# Patient Record
Sex: MALE | Race: WHITE | NOT HISPANIC OR LATINO | Employment: OTHER | URBAN - METROPOLITAN AREA
[De-identification: names, ages, dates, MRNs, and addresses within clinical notes are randomized per-mention and may not be internally consistent; named-entity substitution may affect disease eponyms.]

---

## 2013-11-26 LAB
CREAT SERPL-MCNC: 0.87 MG/DL (ref 0.72–1.25)
GLUCOSE BLD-MCNC: 119 MG/DL (ref 65–100)

## 2014-09-11 LAB
CREAT SERPL-MCNC: 1.26 MG/DL (ref 0.72–1.25)
GLUCOSE BLD-MCNC: 175 MG/DL (ref 65–100)

## 2015-12-29 LAB
CREAT SERPL-MCNC: 1.12 MG/DL (ref 0.72–1.25)
GLUCOSE BLD-MCNC: 274 MG/DL (ref 65–100)

## 2017-03-02 ENCOUNTER — OFFICE VISIT - RIVER FALLS (OUTPATIENT)
Dept: FAMILY MEDICINE | Facility: CLINIC | Age: 67
End: 2017-03-02
Payer: MEDICARE

## 2017-03-09 ENCOUNTER — OFFICE VISIT - RIVER FALLS (OUTPATIENT)
Dept: FAMILY MEDICINE | Facility: CLINIC | Age: 67
End: 2017-03-09
Payer: MEDICARE

## 2017-03-09 ASSESSMENT — MIFFLIN-ST. JEOR: SCORE: 1850.78

## 2017-09-15 ENCOUNTER — COMMUNICATION - RIVER FALLS (OUTPATIENT)
Dept: FAMILY MEDICINE | Facility: CLINIC | Age: 67
End: 2017-09-15
Payer: MEDICARE

## 2017-09-15 ENCOUNTER — AMBULATORY - RIVER FALLS (OUTPATIENT)
Dept: FAMILY MEDICINE | Facility: CLINIC | Age: 67
End: 2017-09-15
Payer: MEDICARE

## 2017-09-16 LAB
CHOLEST SERPL-MCNC: 143 MG/DL
CHOLEST/HDLC SERPL: 3.1 {RATIO}
CREAT SERPL-MCNC: 0.96 MG/DL (ref 0.7–1.25)
GLUCOSE BLD-MCNC: 142 MG/DL (ref 65–99)
HBA1C MFR BLD: 6.6 %
HDLC SERPL-MCNC: 46 MG/DL
LDLC SERPL CALC-MCNC: 76 MG/DL
NONHDLC SERPL-MCNC: 97 MG/DL
PSA SERPL-MCNC: 1.3 NG/ML
TRIGL SERPL-MCNC: 133 MG/DL

## 2017-09-21 ENCOUNTER — OFFICE VISIT - RIVER FALLS (OUTPATIENT)
Dept: FAMILY MEDICINE | Facility: CLINIC | Age: 67
End: 2017-09-21
Payer: MEDICARE

## 2017-09-21 ASSESSMENT — MIFFLIN-ST. JEOR: SCORE: 1850.78

## 2017-11-15 ENCOUNTER — OFFICE VISIT - RIVER FALLS (OUTPATIENT)
Dept: FAMILY MEDICINE | Facility: CLINIC | Age: 67
End: 2017-11-15
Payer: MEDICARE

## 2017-11-15 ASSESSMENT — MIFFLIN-ST. JEOR: SCORE: 1880.72

## 2018-04-04 ENCOUNTER — AMBULATORY - RIVER FALLS (OUTPATIENT)
Dept: FAMILY MEDICINE | Facility: CLINIC | Age: 68
End: 2018-04-04
Payer: MEDICARE

## 2018-04-05 LAB — HBA1C MFR BLD: 8.5 %

## 2018-04-10 ENCOUNTER — OFFICE VISIT - RIVER FALLS (OUTPATIENT)
Dept: FAMILY MEDICINE | Facility: CLINIC | Age: 68
End: 2018-04-10
Payer: MEDICARE

## 2018-04-10 ASSESSMENT — MIFFLIN-ST. JEOR: SCORE: 1863.48

## 2018-05-22 ENCOUNTER — OFFICE VISIT - RIVER FALLS (OUTPATIENT)
Dept: FAMILY MEDICINE | Facility: CLINIC | Age: 68
End: 2018-05-22
Payer: MEDICARE

## 2018-05-22 ASSESSMENT — MIFFLIN-ST. JEOR: SCORE: 1854.41

## 2018-07-19 ENCOUNTER — AMBULATORY - RIVER FALLS (OUTPATIENT)
Dept: FAMILY MEDICINE | Facility: CLINIC | Age: 68
End: 2018-07-19
Payer: MEDICARE

## 2018-07-20 LAB
GLUCOSE BLD-MCNC: 169 MG/DL (ref 65–139)
HBA1C MFR BLD: 8.1 %

## 2018-08-14 ENCOUNTER — OFFICE VISIT - RIVER FALLS (OUTPATIENT)
Dept: FAMILY MEDICINE | Facility: CLINIC | Age: 68
End: 2018-08-14
Payer: MEDICARE

## 2018-08-14 ASSESSMENT — MIFFLIN-ST. JEOR: SCORE: 1828.1

## 2018-10-30 ENCOUNTER — AMBULATORY - RIVER FALLS (OUTPATIENT)
Dept: FAMILY MEDICINE | Facility: CLINIC | Age: 68
End: 2018-10-30
Payer: MEDICARE

## 2018-10-31 LAB
CHOLEST SERPL-MCNC: 156 MG/DL
CHOLEST/HDLC SERPL: 3.4 {RATIO}
CREAT SERPL-MCNC: 1.03 MG/DL (ref 0.7–1.25)
GLUCOSE BLD-MCNC: 233 MG/DL (ref 65–99)
HBA1C MFR BLD: 8.4 %
HDLC SERPL-MCNC: 46 MG/DL
LDLC SERPL CALC-MCNC: 87 MG/DL
NONHDLC SERPL-MCNC: 110 MG/DL
PSA SERPL-MCNC: 1.2 NG/ML
TRIGL SERPL-MCNC: 132 MG/DL

## 2018-11-06 ENCOUNTER — OFFICE VISIT - RIVER FALLS (OUTPATIENT)
Dept: FAMILY MEDICINE | Facility: CLINIC | Age: 68
End: 2018-11-06
Payer: MEDICARE

## 2018-11-06 ASSESSMENT — MIFFLIN-ST. JEOR: SCORE: 1826.29

## 2019-02-12 ENCOUNTER — AMBULATORY - RIVER FALLS (OUTPATIENT)
Dept: FAMILY MEDICINE | Facility: CLINIC | Age: 69
End: 2019-02-12
Payer: MEDICARE

## 2019-02-13 LAB
BUN SERPL-MCNC: 15 MG/DL (ref 7–25)
BUN/CREAT RATIO - HISTORICAL: ABNORMAL (ref 6–22)
CALCIUM SERPL-MCNC: 9.5 MG/DL (ref 8.6–10.3)
CHLORIDE BLD-SCNC: 103 MMOL/L (ref 98–110)
CO2 SERPL-SCNC: 30 MMOL/L (ref 20–32)
CREAT SERPL-MCNC: 0.92 MG/DL (ref 0.7–1.25)
EGFRCR SERPLBLD CKD-EPI 2021: 85 ML/MIN/1.73M2
GLUCOSE BLD-MCNC: 156 MG/DL (ref 65–99)
HBA1C MFR BLD: 8.2 %
POTASSIUM BLD-SCNC: 4.4 MMOL/L (ref 3.5–5.3)
SODIUM SERPL-SCNC: 140 MMOL/L (ref 135–146)

## 2019-03-20 ENCOUNTER — OFFICE VISIT - RIVER FALLS (OUTPATIENT)
Dept: FAMILY MEDICINE | Facility: CLINIC | Age: 69
End: 2019-03-20
Payer: MEDICARE

## 2019-03-20 ASSESSMENT — MIFFLIN-ST. JEOR: SCORE: 1856.23

## 2019-11-19 ENCOUNTER — AMBULATORY - RIVER FALLS (OUTPATIENT)
Dept: FAMILY MEDICINE | Facility: CLINIC | Age: 69
End: 2019-11-19
Payer: MEDICARE

## 2019-11-20 ENCOUNTER — COMMUNICATION - RIVER FALLS (OUTPATIENT)
Dept: FAMILY MEDICINE | Facility: CLINIC | Age: 69
End: 2019-11-20
Payer: MEDICARE

## 2019-11-20 LAB
CHOLEST SERPL-MCNC: 142 MG/DL
CHOLEST/HDLC SERPL: 3 {RATIO}
CREAT UR-MCNC: 171 MG/DL (ref 20–320)
HBA1C MFR BLD: 7.5 %
HDLC SERPL-MCNC: 47 MG/DL
LDLC SERPL CALC-MCNC: 72 MG/DL
MICROALBUMIN UR-MCNC: 0.6 MG/DL
MICROALBUMIN/CREAT UR: 4 MG/G{CREAT}
NONHDLC SERPL-MCNC: 95 MG/DL
TRIGL SERPL-MCNC: 147 MG/DL

## 2019-12-03 ENCOUNTER — OFFICE VISIT - RIVER FALLS (OUTPATIENT)
Dept: FAMILY MEDICINE | Facility: CLINIC | Age: 69
End: 2019-12-03
Payer: MEDICARE

## 2019-12-03 ENCOUNTER — TRANSFERRED RECORDS (OUTPATIENT)
Dept: MULTI SPECIALTY CLINIC | Facility: CLINIC | Age: 69
End: 2019-12-03
Payer: MEDICARE

## 2019-12-03 ASSESSMENT — MIFFLIN-ST. JEOR: SCORE: 1847.15

## 2019-12-10 ENCOUNTER — OFFICE VISIT - RIVER FALLS (OUTPATIENT)
Dept: FAMILY MEDICINE | Facility: CLINIC | Age: 69
End: 2019-12-10
Payer: MEDICARE

## 2019-12-17 ENCOUNTER — OFFICE VISIT - RIVER FALLS (OUTPATIENT)
Dept: FAMILY MEDICINE | Facility: CLINIC | Age: 69
End: 2019-12-17
Payer: MEDICARE

## 2019-12-24 ENCOUNTER — OFFICE VISIT - RIVER FALLS (OUTPATIENT)
Dept: FAMILY MEDICINE | Facility: CLINIC | Age: 69
End: 2019-12-24
Payer: MEDICARE

## 2020-01-07 ENCOUNTER — OFFICE VISIT - RIVER FALLS (OUTPATIENT)
Dept: FAMILY MEDICINE | Facility: CLINIC | Age: 70
End: 2020-01-07
Payer: MEDICARE

## 2020-06-17 ENCOUNTER — AMBULATORY - RIVER FALLS (OUTPATIENT)
Dept: FAMILY MEDICINE | Facility: CLINIC | Age: 70
End: 2020-06-17
Payer: MEDICARE

## 2020-06-18 LAB
BUN SERPL-MCNC: 14 MG/DL (ref 7–25)
BUN/CREAT RATIO - HISTORICAL: ABNORMAL (ref 6–22)
CALCIUM SERPL-MCNC: 9.7 MG/DL (ref 8.6–10.3)
CHLORIDE BLD-SCNC: 104 MMOL/L (ref 98–110)
CO2 SERPL-SCNC: 28 MMOL/L (ref 20–32)
CREAT SERPL-MCNC: 0.97 MG/DL (ref 0.7–1.18)
EGFRCR SERPLBLD CKD-EPI 2021: 79 ML/MIN/1.73M2
GLUCOSE BLD-MCNC: 151 MG/DL (ref 65–99)
HBA1C MFR BLD: 7.2 %
POTASSIUM BLD-SCNC: 4.9 MMOL/L (ref 3.5–5.3)
SODIUM SERPL-SCNC: 141 MMOL/L (ref 135–146)

## 2020-06-23 ENCOUNTER — COMMUNICATION - RIVER FALLS (OUTPATIENT)
Dept: FAMILY MEDICINE | Facility: CLINIC | Age: 70
End: 2020-06-23
Payer: MEDICARE

## 2020-06-24 ENCOUNTER — OFFICE VISIT - RIVER FALLS (OUTPATIENT)
Dept: FAMILY MEDICINE | Facility: CLINIC | Age: 70
End: 2020-06-24
Payer: MEDICARE

## 2020-12-16 ENCOUNTER — AMBULATORY - RIVER FALLS (OUTPATIENT)
Dept: FAMILY MEDICINE | Facility: CLINIC | Age: 70
End: 2020-12-16
Payer: MEDICARE

## 2020-12-17 ENCOUNTER — COMMUNICATION - RIVER FALLS (OUTPATIENT)
Dept: FAMILY MEDICINE | Facility: CLINIC | Age: 70
End: 2020-12-17
Payer: MEDICARE

## 2020-12-17 LAB
CHOLEST SERPL-MCNC: 151 MG/DL
CHOLEST/HDLC SERPL: 3.1 {RATIO}
CREAT UR-MCNC: 185 MG/DL (ref 20–320)
HBA1C MFR BLD: 7.9 %
HDLC SERPL-MCNC: 48 MG/DL
LDLC SERPL CALC-MCNC: 76 MG/DL
MICROALBUMIN UR-MCNC: 0.7 MG/DL
MICROALBUMIN/CREAT UR: 4 MG/G{CREAT}
NONHDLC SERPL-MCNC: 103 MG/DL
TRIGL SERPL-MCNC: 171 MG/DL

## 2020-12-22 ENCOUNTER — OFFICE VISIT - RIVER FALLS (OUTPATIENT)
Dept: FAMILY MEDICINE | Facility: CLINIC | Age: 70
End: 2020-12-22
Payer: MEDICARE

## 2020-12-28 ENCOUNTER — OFFICE VISIT - RIVER FALLS (OUTPATIENT)
Dept: FAMILY MEDICINE | Facility: CLINIC | Age: 70
End: 2020-12-28
Payer: MEDICARE

## 2021-02-18 ENCOUNTER — OFFICE VISIT - RIVER FALLS (OUTPATIENT)
Dept: FAMILY MEDICINE | Facility: CLINIC | Age: 71
End: 2021-02-18
Payer: MEDICARE

## 2021-02-18 ASSESSMENT — MIFFLIN-ST. JEOR: SCORE: 1860.76

## 2021-03-23 ENCOUNTER — OFFICE VISIT - RIVER FALLS (OUTPATIENT)
Dept: FAMILY MEDICINE | Facility: CLINIC | Age: 71
End: 2021-03-23
Payer: MEDICARE

## 2021-07-21 ENCOUNTER — OFFICE VISIT - RIVER FALLS (OUTPATIENT)
Dept: FAMILY MEDICINE | Facility: CLINIC | Age: 71
End: 2021-07-21
Payer: MEDICARE

## 2021-07-21 ASSESSMENT — MIFFLIN-ST. JEOR: SCORE: 1855.32

## 2021-07-22 ENCOUNTER — COMMUNICATION - RIVER FALLS (OUTPATIENT)
Dept: FAMILY MEDICINE | Facility: CLINIC | Age: 71
End: 2021-07-22
Payer: MEDICARE

## 2021-07-22 LAB
BUN SERPL-MCNC: 20 MG/DL (ref 7–25)
BUN/CREAT RATIO - HISTORICAL: ABNORMAL (ref 6–22)
CALCIUM SERPL-MCNC: 9.4 MG/DL (ref 8.6–10.3)
CHLORIDE BLD-SCNC: 100 MMOL/L (ref 98–110)
CO2 SERPL-SCNC: 29 MMOL/L (ref 20–32)
CREAT SERPL-MCNC: 1.03 MG/DL (ref 0.7–1.18)
EGFRCR SERPLBLD CKD-EPI 2021: 73 ML/MIN/1.73M2
GLUCOSE BLD-MCNC: 338 MG/DL (ref 65–99)
HBA1C MFR BLD: 8.6 %
POTASSIUM BLD-SCNC: 4.6 MMOL/L (ref 3.5–5.3)
SODIUM SERPL-SCNC: 136 MMOL/L (ref 135–146)

## 2021-08-02 ENCOUNTER — OFFICE VISIT - RIVER FALLS (OUTPATIENT)
Dept: FAMILY MEDICINE | Facility: CLINIC | Age: 71
End: 2021-08-02
Payer: MEDICARE

## 2021-09-21 ENCOUNTER — OFFICE VISIT - RIVER FALLS (OUTPATIENT)
Dept: FAMILY MEDICINE | Facility: CLINIC | Age: 71
End: 2021-09-21
Payer: MEDICARE

## 2021-10-19 ENCOUNTER — OFFICE VISIT - RIVER FALLS (OUTPATIENT)
Dept: FAMILY MEDICINE | Facility: CLINIC | Age: 71
End: 2021-10-19
Payer: MEDICARE

## 2021-11-29 ENCOUNTER — AMBULATORY - RIVER FALLS (OUTPATIENT)
Dept: FAMILY MEDICINE | Facility: CLINIC | Age: 71
End: 2021-11-29
Payer: MEDICARE

## 2021-12-29 ENCOUNTER — OFFICE VISIT - RIVER FALLS (OUTPATIENT)
Dept: FAMILY MEDICINE | Facility: CLINIC | Age: 71
End: 2021-12-29
Payer: MEDICARE

## 2021-12-30 ENCOUNTER — COMMUNICATION - RIVER FALLS (OUTPATIENT)
Dept: FAMILY MEDICINE | Facility: CLINIC | Age: 71
End: 2021-12-30
Payer: MEDICARE

## 2021-12-30 LAB — HBA1C MFR BLD: 8.9 %

## 2022-01-01 ENCOUNTER — TRANSFERRED RECORDS (OUTPATIENT)
Dept: MULTI SPECIALTY CLINIC | Facility: CLINIC | Age: 72
End: 2022-01-01
Payer: MEDICARE

## 2022-01-01 LAB — RETINOPATHY: NORMAL

## 2022-02-12 VITALS
BODY MASS INDEX: 33.68 KG/M2 | WEIGHT: 240.6 LBS | HEART RATE: 64 BPM | HEIGHT: 71 IN | BODY MASS INDEX: 33.4 KG/M2 | SYSTOLIC BLOOD PRESSURE: 124 MMHG | OXYGEN SATURATION: 97 % | DIASTOLIC BLOOD PRESSURE: 72 MMHG | HEART RATE: 66 BPM | DIASTOLIC BLOOD PRESSURE: 80 MMHG | SYSTOLIC BLOOD PRESSURE: 138 MMHG | HEIGHT: 71 IN | WEIGHT: 238.6 LBS

## 2022-02-12 VITALS
HEART RATE: 68 BPM | DIASTOLIC BLOOD PRESSURE: 86 MMHG | BODY MASS INDEX: 34.22 KG/M2 | BODY MASS INDEX: 33.29 KG/M2 | HEART RATE: 58 BPM | SYSTOLIC BLOOD PRESSURE: 134 MMHG | HEIGHT: 71 IN | SYSTOLIC BLOOD PRESSURE: 104 MMHG | HEIGHT: 71 IN | WEIGHT: 244.4 LBS | OXYGEN SATURATION: 98 % | WEIGHT: 237.8 LBS | OXYGEN SATURATION: 99 % | DIASTOLIC BLOOD PRESSURE: 64 MMHG

## 2022-02-12 VITALS
BODY MASS INDEX: 33.39 KG/M2 | TEMPERATURE: 98.2 F | DIASTOLIC BLOOD PRESSURE: 80 MMHG | SYSTOLIC BLOOD PRESSURE: 130 MMHG | WEIGHT: 239.4 LBS

## 2022-02-12 VITALS
SYSTOLIC BLOOD PRESSURE: 112 MMHG | DIASTOLIC BLOOD PRESSURE: 64 MMHG | WEIGHT: 232.4 LBS | BODY MASS INDEX: 32.53 KG/M2 | HEIGHT: 71 IN | HEART RATE: 64 BPM

## 2022-02-12 VITALS
HEART RATE: 61 BPM | WEIGHT: 239 LBS | HEIGHT: 71 IN | OXYGEN SATURATION: 97 % | BODY MASS INDEX: 33.46 KG/M2 | SYSTOLIC BLOOD PRESSURE: 118 MMHG | DIASTOLIC BLOOD PRESSURE: 72 MMHG

## 2022-02-12 VITALS
SYSTOLIC BLOOD PRESSURE: 124 MMHG | WEIGHT: 240 LBS | BODY MASS INDEX: 33.6 KG/M2 | HEART RATE: 72 BPM | HEIGHT: 71 IN | DIASTOLIC BLOOD PRESSURE: 68 MMHG

## 2022-02-12 VITALS
WEIGHT: 237.8 LBS | HEIGHT: 71 IN | HEART RATE: 62 BPM | SYSTOLIC BLOOD PRESSURE: 112 MMHG | BODY MASS INDEX: 33.29 KG/M2 | DIASTOLIC BLOOD PRESSURE: 68 MMHG

## 2022-02-12 VITALS
HEART RATE: 56 BPM | TEMPERATURE: 97.4 F | DIASTOLIC BLOOD PRESSURE: 81 MMHG | HEIGHT: 71 IN | BODY MASS INDEX: 33.43 KG/M2 | DIASTOLIC BLOOD PRESSURE: 84 MMHG | BODY MASS INDEX: 32.64 KG/M2 | WEIGHT: 238.8 LBS | SYSTOLIC BLOOD PRESSURE: 134 MMHG | HEART RATE: 76 BPM | SYSTOLIC BLOOD PRESSURE: 128 MMHG | WEIGHT: 234 LBS

## 2022-02-12 VITALS — SYSTOLIC BLOOD PRESSURE: 127 MMHG | DIASTOLIC BLOOD PRESSURE: 81 MMHG | HEART RATE: 61 BPM

## 2022-02-12 VITALS
BODY MASS INDEX: 33.61 KG/M2 | HEART RATE: 71 BPM | DIASTOLIC BLOOD PRESSURE: 83 MMHG | WEIGHT: 241 LBS | SYSTOLIC BLOOD PRESSURE: 131 MMHG | TEMPERATURE: 97.1 F

## 2022-02-12 VITALS
OXYGEN SATURATION: 95 % | HEART RATE: 66 BPM | SYSTOLIC BLOOD PRESSURE: 114 MMHG | DIASTOLIC BLOOD PRESSURE: 64 MMHG | WEIGHT: 232.8 LBS | BODY MASS INDEX: 32.59 KG/M2 | HEIGHT: 71 IN

## 2022-02-12 VITALS
DIASTOLIC BLOOD PRESSURE: 72 MMHG | HEIGHT: 71 IN | BODY MASS INDEX: 33.18 KG/M2 | HEART RATE: 66 BPM | WEIGHT: 237 LBS | SYSTOLIC BLOOD PRESSURE: 124 MMHG

## 2022-02-16 NOTE — LETTER
(Inserted Image. Unable to display)     September 23, 2019      LENNOX SAM  [address]   [place], [place] [id number]          Dear LENNOX,      Thank you for selecting [facility] (previously [facility] [place] [facility]) for your healthcare needs.    Our records indicate you are due for the following services:    Diabetic Exam ~ Please bring your glucose meter and/or your blood glucose diary to your appointment.    Urine Labs ~ Please be prepared to leave a urine specimen for evaluation.  Fasting Lab Tests ~ Please do not eat or drink anything 10 hours prior to your scheduled appointment time.  (Water and any medications that you may need are allowed unless directed otherwise.)    If you had your labs done at another facility or with Direct Access Lab Testing at [facility], please bring in a copy of the results to your next visit, mail a copy, or drop off a copy of your results to your Healthcare Provider.    You are due for lab work and an office visit; please schedule the lab appointment 1 week before the office visit.  This will assure all results are available to discuss with your provider during your visit.    **It is very helpful if you bring your medication bottles to your appointment.  This assures we have all of your current medications, including strength and dosing information, documented accurately in your medical record.    To schedule an appointment or if you have further questions, please contact your primary clinic:   [facility]       [phone]   [facility]       [phone]              [facility]     [phone]      Powered by [employer]    Sincerely,    Austen Mcdaniels MD

## 2022-02-16 NOTE — TELEPHONE ENCOUNTER
---------------------  From: Sarah De Souza (Phone Messages Pool ([address]))   To: [facility] Message Pool ([address]);     Sent: 12/20/2019 3:46:06 PM CST  Subject: Stress Test Results     Phone Message    PCP: BERNARDO    Time of Call: 1536    Phone Number: [phone]    Returned call at: 1545    Note: Patient called stating that he had a stress test done 1 week ago and is looking for the results.     Called patient @ 1545. Patient notified there are no results in chart and BERNARDO will get back to pt on 12/23/19.---------------------  From: Freda Sanders CMA ([facility] Message Pool ([address]))   To: Austen Mcdaniels MD;     Sent: 12/23/2019 12:20:46 PM CST  Subject: FW: Stress Test Results     Have you seen these yet?---------------------  From: Austen Mcdaniels MD   To: [facility] Message Pool ([id number]_[place]);     Sent: 12/23/2019 4:04:36 PM CST  Subject: RE: Stress Test Results     stress test is negative.  all measured is normalPt called and informed of normal results

## 2022-02-16 NOTE — NURSING NOTE
Comprehensive Intake Entered On:  7/21/2021 12:06 PM CDT    Performed On:  7/21/2021 12:01 PM CDT by Vianey Todd CMA               Summary   Chief Complaint :   c/o  back pain x month ; had 3 massage sessions, tylenol and heating pads   Weight Measured :   238.8 lb(Converted to: 238 lb 13 oz, 108.318 kg)    Height Measured :   71 in(Converted to: 5 ft 11 in, 180.34 cm)    Body Mass Index :   33.3 kg/m2 (HI)    Body Surface Area :   2.33 m2   Systolic Blood Pressure :   128 mmHg   Diastolic Blood Pressure :   81 mmHg (HI)    Mean Arterial Pressure :   97 mmHg   Peripheral Pulse Rate :   76 bpm   BP Site :   Right arm   BP Method :   Electronic   HR Method :   Electronic   Vianey Todd CMA - 7/21/2021 12:01 PM CDT   Health Status   Allergies Verified? :   Yes   Medication History Verified? :   Yes   Immunizations Current :   Yes   Medical History Verified? :   Yes   Vianey Todd CMA - 7/21/2021 12:01 PM CDT   Consents   Consent for Immunization Exchange :   Consent Granted   Consent for Immunizations to Providers :   Consent Granted   Vianey Todd CMA - 7/21/2021 12:01 PM CDT   Meds / Allergies   (As Of: 7/21/2021 12:06:28 PM CDT)   Allergies (Active)   No Known Medication Allergies  Estimated Onset Date:   Unspecified ; Created By:   Toña Person CMA; Reaction Status:   Active ; Category:   Drug ; Substance:   No Known Medication Allergies ; Type:   Allergy ; Updated By:   Toña Person CMA; Reviewed Date:   7/21/2021 12:05 PM CDT        Medication List   (As Of: 7/21/2021 12:06:28 PM CDT)   Prescription/Discharge Order    insulin glargine  :   insulin glargine ; Status:   Prescribed ; Ordered As Mnemonic:   Basaglar KwikPen 100 units/mL subcutaneous solution ; Simple Display Line:   See Instructions, ADMINISTER 20 UNITS UNDER THE SKIN AT BEDTIME, 15 mL, 1 Refill(s) ; Ordering Provider:   Austen Mcdaniels MD; Catalog Code:   insulin glargine ; Order Dt/Tm:   6/2/2021 10:28:43 AM CDT          Miscellaneous  Rx Supply  :   Miscellaneous Rx Supply ; Status:   Prescribed ; Ordered As Mnemonic:   Freestyle Kaycee patches ; Simple Display Line:   See Instructions, apply every 14 days, 3 EA, 6 Refill(s) ; Ordering Provider:   Austen Mcdaniels MD; Catalog Code:   Miscellaneous Rx Supply ; Order Dt/Tm:   3/20/2019 9:35:37 AM CDT          Miscellaneous Rx Supply  :   Miscellaneous Rx Supply ; Status:   Prescribed ; Ordered As Mnemonic:   Freestyle Kaycee monitor ; Simple Display Line:   See Instructions, Use as needed for glucose monitoring, 14 day monitor, 2 EA, 11 Refill(s) ; Ordering Provider:   Austen Mcdaniels MD; Catalog Code:   Miscellaneous Rx Supply ; Order Dt/Tm:   3/27/2019 5:02:37 PM CDT          Miscellaneous Prescription  :   Miscellaneous Prescription ; Status:   Prescribed ; Ordered As Mnemonic:   diabetic test strips ; Simple Display Line:   See Instructions, test bid, 1 bottle(s) ; Ordering Provider:   Austen Mcdaniels MD; Catalog Code:   Miscellaneous Prescription ; Order Dt/Tm:   4/8/2015 2:43:52 PM CDT          Miscellaneous Rx Supply  :   Miscellaneous Rx Supply ; Status:   Prescribed ; Ordered As Mnemonic:   B-D Pen Needle Tiffanie 07Az7XT(5/32) ; Simple Display Line:   See Instructions, Use with Basaglar once daily, 100 EA, 3 Refill(s) ; Ordering Provider:   Austen Mcdaniels MD; Catalog Code:   Miscellaneous Rx Supply ; Order Dt/Tm:   2/24/2021 3:06:35 PM CST          simvastatin  :   simvastatin ; Status:   Prescribed ; Ordered As Mnemonic:   simvastatin 80 mg oral tablet ; Simple Display Line:   1 tab(s), Oral, qhs, 90 tab(s), 1 Refill(s) ; Ordering Provider:   Austen Mcdaniels MD; Catalog Code:   simvastatin ; Order Dt/Tm:   12/28/2020 10:05:50 AM CST          metoprolol  :   metoprolol ; Status:   Prescribed ; Ordered As Mnemonic:   Metoprolol Tartrate 25 mg oral tablet ; Simple Display Line:   1 tab(s), Oral, bid, 180 tab(s), 1 Refill(s) ; Ordering Provider:   Austen Mcdaniels MD; Catalog Code:    metoprolol ; Order Dt/Tm:   12/28/2020 10:05:33 AM CST          metFORMIN  :   metFORMIN ; Status:   Prescribed ; Ordered As Mnemonic:   metFORMIN 1000 mg oral tablet ; Simple Display Line:   1,000 mg, 1 tab(s), Oral, bid, 180 tab(s), 3 Refill(s) ; Ordering Provider:   Austen Mcdaniels MD; Catalog Code:   metFORMIN ; Order Dt/Tm:   12/28/2020 10:05:58 AM CST          dulaglutide  :   dulaglutide ; Status:   Prescribed ; Ordered As Mnemonic:   Trulicity Pen 1.5 mg/0.5 mL subcutaneous solution ; Simple Display Line:   1.5 mg, Subcutaneous, qweek, 6 mL, 0 Refill(s) ; Ordering Provider:   Austen Mcdaniels MD; Catalog Code:   dulaglutide ; Order Dt/Tm:   4/5/2021 1:19:50 PM CDT            Home Meds    aspirin  :   aspirin ; Status:   Documented ; Ordered As Mnemonic:   aspirin ; Simple Display Line:   81 mg, po, daily ; Catalog Code:   aspirin ; Order Dt/Tm:   9/20/2014 9:41:12 AM CDT

## 2022-02-16 NOTE — TELEPHONE ENCOUNTER
Entered by Yessenia Chatman CMA on June 23, 2021 6:04:55 AM CDT  ---------------------  From: Yessenia Chatman CMA   To: [facility] #[id number]    Sent: 6/23/2021 6:04:55 AM CDT  Subject: Medication Management     ** Not Approved: Patient has requested refill too soon, #180, 3 sent 12/28/20 **  metFORMIN (METFORMIN 1000MG TABLETS)  TAKE 1 TABLET BY MOUTH TWICE DAILY  Qty:  180 tab(s)        Days Supply:  90        Refills:  0          Substitutions Allowed     Route To Pharmacy - [facility] #[id number]   Signed by Yessenia Chatman CMA            ------------------------------------------  From: [facility] #[id number]  To: Austen Mcdaniels MD  Sent: June 22, 2021 3:48:47 AM CDT  Subject: Medication Management  Due: June 4, 2021 8:26:15 PM CDT     ** On Hold Pending Signature **     Dispensed Drug: metFORMIN (metFORMIN 1000 mg oral tablet), TAKE 1 TABLET BY MOUTH TWICE DAILY  Quantity: 180 tab(s)  Days Supply: 90  Refills: 0  Substitutions Allowed  Notes from Pharmacy:  ------------------------------------------

## 2022-02-16 NOTE — TELEPHONE ENCOUNTER
---------------------  From: Austen Mcdaniels MD   To: LENNOX SAM    Sent: 2/13/2019 3:37:03 PM CST      I expected a better A1c after starting basaglar,  unfortunately it has not changed.  Please let me know your day to day blood sugars and any problems    Results:  Date Result Name Ind Value Ref Range   2/12/2019 8:30 AM Sodium Level  140 mmol/L (135 - 146)   2/12/2019 8:30 AM Potassium Level  4.4 mmol/L (3.5 - 5.3)   2/12/2019 8:30 AM Chloride Level  103 mmol/L (98 - 110)   2/12/2019 8:30 AM CO2 Level  30 mmol/L (20 - 32)   2/12/2019 8:30 AM Glucose Level ((H)) 156 mg/dL (65 - 99)   2/12/2019 8:30 AM BUN  15 mg/dL (7 - 25)   2/12/2019 8:30 AM Creatinine Level  0.92 mg/dL (0.70 - 1.25)   2/12/2019 8:30 AM BUN/Creat Ratio  NOT APPLICABLE (6 - 22)   2/12/2019 8:30 AM eGFR  85 mL/min/1.73m2 (> OR = 60 - )   2/12/2019 8:30 AM eGFR African American  98 mL/min/1.73m2 (> OR = 60 - )   2/12/2019 8:30 AM Calcium Level  9.5 mg/dL (8.6 - 10.3)   2/12/2019 8:30 AM Hgb A1c ((H)) 8.2 ( - <5.7)

## 2022-02-16 NOTE — TELEPHONE ENCOUNTER
Entered by Yessenia Chatman CMA on November 22, 2019 7:17:21 PM CST  ---------------------  From: Yessenia Chatman CMA   To: [facility] #[id number]    Sent: 11/22/2019 7:17:21 PM CST  Subject: Medication Management     ** Not Approved: Patient needs appointment, protocol fill sent today - appt needed for further refills **  metFORMIN (METFORMIN 1000MG TABLETS)  TAKE 1 TABLET BY MOUTH TWICE DAILY DAILY  Qty:  180 tab(s)        Days Supply:  30        Refills:  0          Substitutions Allowed     Route To Pharmacy - [facility] #[id number]   Note from Pharmacy:  **Patient requests 90 days supply**  Signed by Yessenia Chatman CMA            ------------------------------------------  From: [facility] #[id number]  To: Austen Mcdaniels MD  Sent: November 22, 2019 3:00:16 PM CST  Subject: Medication Management  Due: November 23, 2019 3:00:16 PM CST    ** On Hold Pending Signature **  Drug: metFORMIN (metFORMIN 1000 mg oral tablet)  1 TAB(S) ORAL BID  Quantity: 60 tab(s)  Days Supply: 0  Refills: 0  Substitutions Allowed  Notes from Pharmacy: appt coming up**Patient requests 90 days supply**    Dispensed Drug: metFORMIN (metFORMIN 1000 mg oral tablet)  TAKE 1 TABLET BY MOUTH TWICE DAILY DAILY  Quantity: 180 tab(s)  Days Supply: 30  Refills: 0  Substitutions Allowed  Notes from Pharmacy: **Patient requests 90 days supply**  ------------------------------------------
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
## 2022-02-16 NOTE — TELEPHONE ENCOUNTER
Entered by Sarah De Souza on June 05, 2020 9:52:38 AM CDT  PCP:   BERNARDO    Medication:   Metoprolol  Last Filled:  12/3/19   Quantity:  90 Refills:  1    Date of last office visit and reason:   12/3/19 AWV  Date of last labs pertaining to condition:  _    Note:  Please advise. Pt is over due for a visit    Return to Clinic order placed?  Placed 12/3/19; Return 1 year for AWV    Resource:   _  Phone:   _            ------------------------------------------  From: [facility] #[id number]  To: Austen Mcdaniels MD  Sent: June 4, 2020 12:49:29 PM CDT  Subject: Medication Management  Due: May 30, 2020 3:27:43 PM CDT     ** On Hold Pending Signature **     Dispensed Drug: simvastatin (simvastatin 80 mg oral tablet), TAKE 1 TABLET BY MOUTH AT BEDTIME  Quantity: 90 tab(s)  Days Supply: 90  Refills: 0  Substitutions Allowed  Notes from Pharmacy:     ** On Hold Pending Signature **     Dispensed Drug: metoprolol (Metoprolol Tartrate 25 mg oral tablet), TAKE 1 TABLET BY MOUTH TWICE DAILY  Quantity: 180 tab(s)  Days Supply: 90  Refills: 0  Substitutions Allowed  Notes from Pharmacy:  ---------------------------------------------------------------  From: Sarah De Souza (eRx Pool (32224_[facility]))   To: BERNARDO Message Pool ([id number]_[place]);     Sent: 6/5/2020 9:52:45 AM CDT  Subject: FW: Medication Management   Due Date/Time: 6/5/2020 12:49:00 PM CDT  ** Submitted: **  Order:metoprolol (Metoprolol Tartrate 25 mg oral tablet)  1 tab(s)  Oral  bid  Qty:  60 tab(s)        Refills:  0          Substitutions Allowed     Route To Pharmacy - [facility] #[id number]    Signed by Freda Sanders CMA  6/8/2020 2:58:00 PM    ** Submitted: **  Complete:metoprolol (Metoprolol Tartrate 25 mg oral tablet)   Signed by Freda Sanders CMA  6/8/2020 2:58:00 PM    ** Not Approved:  **  metoprolol (METOPROLOL TARTRATE 25MG TABLETS)  TAKE 1 TABLET BY MOUTH TWICE DAILY  Qty:  180 tab(s)        Refills:  0           Substitutions Allowed     Details:  180 tab(s), TAKE 1 TABLET BY MOUTH TWICE DAILY, Route to Pharmacy Electron[facility] #[id number], 6/4/2020, 3/6/2020, 90, Austen Mcdaniels MD      Route To Pharmacy - [facility] #[id number]   Signed by Freda Sanders CMA---------------------  From: Freda Sanders CMA   To: [facility] #[id number]    Sent: 6/8/2020 9:58:58 AM CDT  Subject: FW: Medication Management     ** Submitted: **  Order:simvastatin (simvastatin 80 mg oral tablet)  1 tab(s)  Oral  qhs  Qty:  30 tab(s)        Refills:  0          Substitutions Allowed     Route To Pharmacy - [facility] #[id number]    Signed by Freda Sanders CMA  6/8/2020 2:58:00 PM    ** Submitted: **  Complete:simvastatin (simvastatin 80 mg oral tablet)   Signed by Freda Sanders CMA  6/8/2020 2:58:00 PM    ** Not Approved:  **  simvastatin (SIMVASTATIN 80MG TABLETS)  TAKE 1 TABLET BY MOUTH AT BEDTIME  Qty:  90 tab(s)        Refills:  0          Substitutions Allowed     Details:  90 tab(s), TAKE 1 TABLET BY MOUTH AT BEDTIME, Route to Pharmacy [facility] #[id number], 6/4/2020, 3/6/2020, 90, Austen Mcdaniels MD      Route To Pharmacy [facility] #[id number]   Signed by Freda Sanders CMARefilled for 30 days. Pt is due appt.   Immediate Endpoint: purpura

## 2022-02-16 NOTE — NURSING NOTE
Diabetes Eye Testing Entered On:  10/1/2021 10:35 AM CDT    Performed On:  9/21/2021 10:35 AM CDT by Chen Landaverde               Diabetes Eye Testing   Retinopathy Present TR :   No   Dilated Retinal Exam Date TR :   9/21/2021 CDT   Chen Landaverde - 10/1/2021 10:35 AM CDT

## 2022-02-16 NOTE — TELEPHONE ENCOUNTER
Entered by Yessenia Chatman CMA on August 04, 2021 6:36:51 AM CDT  ---------------------  From: Yessenia Chatman CMA   To: [facility] #[id number]    Sent: 8/4/2021 6:36:51 AM CDT  Subject: Medication Management     ** Submitted: **  Order:Miscellaneous Prescription (ACCU-CHEK MARILEE PLUS STRIPS 100S)  See Instructions  TEST FOUR TIMES DAILY  Qty:  400 strip        Refills:  1          Substitutions Allowed     Route To Pharmacy - [facility] #[id number]    Signed by Yessenia Chatman CMA  8/4/2021 11:36:00 AM [facility]    ** Submitted: **  Complete:Return to Clinic (Request)  Details:           Signed by Yessenia Chatman CMA  8/4/2021 11:36:00 AM [facility]    ** Not Approved:  **  Miscellaneous Prescription (ACCU-CHEK MARILEE PLUS STRIPS 100S)  TEST FOUR TIMES DAILY  Qty:  300 strip        Days Supply:  75        Refills:  0          Substitutions Allowed     Route To Pharmacy - [facility] #[id number]   Note from Pharmacy:  **Patient requests 90 days supply**  Signed by Yessenia Chatman CMA            ------------------------------------------  From: [facility] #[id number]  To: Austen Mcdaniels MD  Sent: August 2, 2021 12:20:51 PM CDT  Subject: Medication Management  Due: August 3, 2021 11:17:00 AM CDT     ** On Hold Pending Signature **     Dispensed Drug: ACCU-CHEK MARILEE PLUS STRIPS 100S, TEST FOUR TIMES DAILY  Quantity: 300 strip  Days Supply: 75  Refills: 0  Substitutions Allowed  Notes from Pharmacy: **Patient requests 90 days supply**  ------------------------------------------

## 2022-02-16 NOTE — TELEPHONE ENCOUNTER
Entered by Lorenzo Olivas PA-C on December 15, 2021 3:05:09 PM CST  ---------------------  From: Lorenzo Olivas PA-C   To: [facility] #[id number]    Sent: 12/15/2021 3:05:09 PM CST  Subject: Medication Management     ** Submitted: **  Complete:dulaglutide (Trulicity Pen 3 mg/0.5 mL subcutaneous solution)   Signed by Lorenzo Olivas PA-C  12/15/2021 9:05:00 PM [facility]    ** Approved **  dulaglutide (TRULICITY 3MG/0.5ML SDP 0.5ML)  ADMINISTER ONE PEN UNDER THE SKIN EVERY WEEK AS DIRECTED  Qty:  2 mL        Days Supply:  28        Refills:  0          Substitutions Allowed     Route To Pharmacy - [facility] #[id number]   Note from Pharmacy:  ZERO refills remain on this prescription. Your patient is requesting advance approval of refills for this medication to PREVENT ANY MISSED DOSES  Signed by Lorenzo Olivas PA-C            ------------------------------------------  From: [facility] #[id number]  To: Austen Mcdaniels MD  Sent: December 15, 2021 8:08:36 AM CST  Subject: Medication Management  Due: December 15, 2021 8:09:06 PM CST     ** On Hold Pending Signature **     Dispensed Drug: dulaglutide (Trulicity Pen 3 mg/0.5 mL subcutaneous solution), ADMINISTER ONE PEN UNDER THE SKIN EVERY WEEK AS DIRECTED  Quantity: 2 mL  Days Supply: 28  Refills: 0  Substitutions Allowed  Notes from Pharmacy: ZERO refills remain on this prescription. Your patient is requesting advance approval of refills for this medication to PREVENT ANY MISSED DOSES  ------------------------------------------
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
## 2022-02-16 NOTE — NURSING NOTE
Comprehensive Intake Entered On:  6/24/2020 9:01 AM CDT    Performed On:  6/24/2020 9:00 AM CDT by Freda Sanders CMA   Chief Complaint :   f/u lab work. verbal consent given for video visit   Freda Sanders CMA - 6/24/2020 9:00 AM CDT   Health Status   Allergies Verified? :   Yes   Medication History Verified? :   Yes   Immunizations Current :   Yes   Pre-Visit Planning Status :   Completed   Tobacco Use? :   Former smoker   Freda Sanders CMA - 6/24/2020 9:00 AM CDT   Consents   Consent for Immunization Exchange :   Consent Granted   Consent for Immunizations to Providers :   Consent Granted   Freda Sanders CMA - 6/24/2020 9:00 AM CDT   Meds / Allergies   (As Of: 6/24/2020 9:01:47 AM CDT)   Allergies (Active)   No Known Medication Allergies  Estimated Onset Date:   Unspecified ; Created By:   Toña Person CMA; Reaction Status:   Active ; Category:   Drug ; Substance:   No Known Medication Allergies ; Type:   Allergy ; Updated By:   Toña Person CMA; Reviewed Date:   12/3/2019 10:30 AM CST        Medication List   (As Of: 6/24/2020 9:01:47 AM CDT)   Prescription/Discharge Order    dulaglutide  :   dulaglutide ; Status:   Prescribed ; Ordered As Mnemonic:   Trulicity Pen 1.5 mg/0.5 mL subcutaneous solution ; Simple Display Line:   See Instructions, INJECT 1.5 MG SUBCUTANEOUSLY EVERY WEEK, 2 mL, 5 Refill(s) ; Ordering Provider:   Austen Mcdaniels MD; Catalog Code:   dulaglutide ; Order Dt/Tm:   12/3/2019 10:16:39 AM CST          insulin glargine  :   insulin glargine ; Status:   Prescribed ; Ordered As Mnemonic:   Basaglar KwikPen 100 units/mL subcutaneous solution ; Simple Display Line:   20 unit(s), Subcutaneous, hs, 15 mL, 3 Refill(s) ; Ordering Provider:   Austen Mcdaniels MD; Catalog Code:   insulin glargine ; Order Dt/Tm:   12/3/2019 10:17:14 AM CST          metFORMIN  :   metFORMIN ; Status:   Prescribed ; Ordered As Mnemonic:   metFORMIN 1000 mg oral  tablet ; Simple Display Line:   1,000 mg, 1 tab(s), Oral, bid, 180 tab(s), 1 Refill(s) ; Ordering Provider:   Austen Mcdaniels MD; Catalog Code:   metFORMIN ; Order Dt/Tm:   12/3/2019 10:17:47 AM CST          metoprolol  :   metoprolol ; Status:   Prescribed ; Ordered As Mnemonic:   Metoprolol Tartrate 25 mg oral tablet ; Simple Display Line:   1 tab(s), Oral, bid, 60 tab(s), 0 Refill(s) ; Ordering Provider:   Austen Mcdaniels MD; Catalog Code:   metoprolol ; Order Dt/Tm:   6/8/2020 9:58:16 AM CDT          Miscellaneous Prescription  :   Miscellaneous Prescription ; Status:   Prescribed ; Ordered As Mnemonic:   diabetic test strips ; Simple Display Line:   See Instructions, test bid, 1 bottle(s) ; Ordering Provider:   Austen Mcdaniels MD; Catalog Code:   Miscellaneous Prescription ; Order Dt/Tm:   4/8/2015 2:43:52 PM CDT          Miscellaneous Rx Supply  :   Miscellaneous Rx Supply ; Status:   Prescribed ; Ordered As Mnemonic:   B-D Pen Needle Tiffanie [id number](5/32) ; Simple Display Line:   See Instructions, Use with Basaglar once daily, 100 EA, 3 Refill(s) ; Ordering Provider:   Austen Mcdaniels MD; Catalog Code:   Miscellaneous Rx Supply ; Order Dt/Tm:   12/30/2019 6:21:34 PM CST          Miscellaneous Rx Supply  :   Miscellaneous Rx Supply ; Status:   Prescribed ; Ordered As Mnemonic:   Freestyle Kaycee monitor ; Simple Display Line:   See Instructions, Use as needed for glucose monitoring, 14 day monitor, 2 EA, 11 Refill(s) ; Ordering Provider:   Austen Mcdaniels MD; Catalog Code:   Miscellaneous Rx Supply ; Order Dt/Tm:   3/27/2019 5:02:37 PM CDT          Miscellaneous Rx Supply  :   Miscellaneous Rx Supply ; Status:   Prescribed ; Ordered As Mnemonic:   Freestyle Kaycee patches ; Simple Display Line:   See Instructions, apply every 14 days, 3 EA, 6 Refill(s) ; Ordering Provider:   Austen Mcdaniels MD; Catalog Code:   Miscellaneous Rx Supply ; Order Dt/Tm:   3/20/2019 9:35:37 AM CDT          simvastatin  :    simvastatin ; Status:   Prescribed ; Ordered As Mnemonic:   simvastatin 80 mg oral tablet ; Simple Display Line:   1 tab(s), Oral, qhs, 30 tab(s), 0 Refill(s) ; Ordering Provider:   Austen Mcdaniels MD; Catalog Code:   simvastatin ; Order Dt/Tm:   6/8/2020 9:58:37 AM CDT            Home Meds    aspirin  :   aspirin ; Status:   Documented ; Ordered As Mnemonic:   aspirin ; Simple Display Line:   81 mg, po, daily ; Catalog Code:   aspirin ; Order Dt/Tm:   9/20/2014 9:41:12 AM CDT            ID Risk Screen   Recent Travel History :   No recent travel   Family Member Travel History :   No recent travel   Other Exposure to Infectious Disease :   Unknown   Freda Sanders CMA - 6/24/2020 9:00 AM CDT

## 2022-02-16 NOTE — TELEPHONE ENCOUNTER
---------------------  From: Chela Machuca LPN (Phone Messages Pool ([id number]_[facility]))   Sent: 2/24/2021 3:10:44 PM CST  Subject: General Message     Phone Message    PCP:   [facility]      Time of Call:  2:39pm       Person Calling:  pt  Phone number:  [phone] OK to SEAN     Returned call at: 3:08pm    Note:   Pt SEAN stating he only has 1 needle left for his Basaglar. Pt says [facility] said Rx is back ordered and they sent message to [facility] but have received no response.    Called [facility]- pharmacist verified that B-D Pen Needle Tiffanie [id number] is NOT backordered and they do have it in stock.  Sent new Rx.    Pt informed.    Last office visit and reason:  2/18/21 General Preoperative Note

## 2022-02-16 NOTE — PROGRESS NOTES
Patient:   LENNOX SAM            MRN: [id number]            FIN: [id number]               Age:   68 years     Sex:  Male     :  1950   Associated Diagnoses:   CAD (Coronary Artery Disease); Diabetes; Failed hearing screening; Physical exam; Medicare annual wellness visit, initial   Author:   Austen Mcdaniels MD      Visit Information      Care Providers  Not recorded for selected visit.  Ancillary Services  Not recorded for selected visit.          Current Visit Date:  2019          Chief Complaint   12/3/2019 9:01 AM CST    AWV      History of Present Illness             The patient presents for Medicare annual wellness exam.  The patient's general health status is described as unchanged and stable.  The patient's diet is described as balanced.  Exercise occasional.  Associated symptoms consist of none, denies shortness of breath and denies weight loss.  Medical encounters: has been very busy and active with camping and hiking.  Compliance problems: with diet, with weight management and the freestyle wade fell off several times so he has stopped using.        Review of Systems   Constitutional:  No fever, No chills.    Eye   Ear/Nose/Mouth/Throat:  Hearing is evaluated, No nasal congestion, No sore throat.    See completed Health History for Review of Systems   Respiratory:  No shortness of breath, No cough.    Cardiovascular:  Chest pain (not with activity but he has had occasional chest pressure short lived).    Breast   Gastrointestinal:  No nausea, No vomiting, No diarrhea, No constipation.    Genitourinary:  No dysuria.    Gynecologic   Hematology/Lymphatics:  No bruising tendency, No swollen lymph glands.    Endocrine   Immunologic:  No recurrent fevers, No recurrent infections.    Musculoskeletal:  No muscle pain.    Integumentary:  No rash.    Neurologic:  No tingling, No headache.    Psychiatric:  GDS noted.    All other systems.     Health Status   Allergies:    Allergic Reactions  (Selected)  No Known Medication Allergies   Medications:  (Selected)   Prescriptions  Prescribed  Basaglar KwikPen 100 units/mL subcutaneous solution: ( 20 unit(s) ), Subcutaneous, hs, # 15 mL, 3 Refill(s), Type: Maintenance, [id number], 20 unit(s) Subcutaneous hs  Freestyle Kaycee monitor: Freestyle Kaycee monitor, See Instructions, Instructions: Use as needed for glucose monitoring, 14 day monitor, Supply, # 2 EA, 11 Refill(s), Type: Maintenance, [id number], Use as needed for glucose monitoring, 14 day monitor  Freestyle Kaycee patches: Freestyle Kaycee patches, See Instructions, Instructions: apply every 14 days, Supply, # 3 EA, 6 Refill(s), Type: Maintenance, Pharmacy: [id number], apply every 14 days  Metoprolol Tartrate 25 mg oral tablet: = 1 tab(s) ( 25 mg ), Oral, bid, # 180 tab(s), 1 Refill(s), Type: Soft Stop, Pharmacy: [facility] #[id number], 1 tab(s) Oral bid  Trulicity Pen 1.5 mg/0.5 mL subcutaneous solution: See Instructions, Instructions: INJECT 1.5 MG SUBCUTANEOUSLY EVERY WEEK, # 2 mL, 5 Refill(s), Type: Soft Stop, Pharmacy: [facility] #[id number], INJECT 1.5 MG SUBCUTANEOUSLY EVERY WEEK  diabetic test strips: diabetic test strips, See Instructions, Instructions: test bid, Supply, # 1 bottle(s), 5 Refill(s), Type: Maintenance, Pharmacy: [facility] #[id number], test bid  metFORMIN 1000 mg oral tablet: = 1 tab(s) ( 1,000 mg ), Oral, bid, # 180 tab(s), 1 Refill(s), Type: Soft Stop, Pharmacy: [facility] #[id number], appt coming up, 1 tab(s) Oral bid  simvastatin 80 mg oral tablet: = 1 tab(s) ( 80 mg ), Oral, hs, # 90 tab(s), 1 Refill(s), Type: Soft Stop, Pharmacy: [facility] #[id number], 1 tab(s) Oral hs  Documented Medications  Documented  aspirin: ( 81 mg ), po, daily, 0 Refill(s), Type: Maintenance,    Medications          *denotes recorded medication          diabetic test strips: See Instructions, test bid, 1 bottle(s).           Freestyle Kaycee patches: See Instructions, apply every 14 days, 3 EA, 6 Refill(s).          Freestyle Kaycee monitor: See Instructions, Use as needed for glucose monitoring, 14 day monitor, 2 EA, 11 Refill(s).          *aspirin: 81 mg, po, daily.          Trulicity Pen 1.5 mg/0.5 mL subcutaneous solution: See Instructions, INJECT 1.5 MG SUBCUTANEOUSLY EVERY WEEK, 2 mL, 5 Refill(s).          Basaglar KwikPen 100 units/mL subcutaneous solution: 20 unit(s), Subcutaneous, hs, 15 mL, 3 Refill(s).          metFORMIN 1000 mg oral tablet: 1,000 mg, 1 tab(s), Oral, bid, 180 tab(s), 1 Refill(s).          Metoprolol Tartrate 25 mg oral tablet: 25 mg, 1 tab(s), Oral, bid, 180 tab(s), 1 Refill(s).          simvastatin 80 mg oral tablet: 80 mg, 1 tab(s), Oral, hs, 90 tab(s), 1 Refill(s).       Problem list:    All Problems  CAD (Coronary Artery Disease) / 414.00 / Confirmed  Dyslipidemia / 272.4 / Confirmed  Diabetes / 250.00 / Confirmed  Diverticulosis / 562.10 / Confirmed  Nephrolithiasis / 948219561 / Confirmed  Tinnitus / 388.30 / Confirmed  Obesity / 278.00 / Probable  Use of anticoagulation / 513773308 / Confirmed  Total replacement of right knee joint / 6534817982 / Confirmed  Knee arthropathy / 0813915125 / Confirmed  Long term current use of oral hypoglycemic drug / 541603138 / Confirmed   Medicare Assessments      Harris Fall Risk  (12/03/2019 09:01 am)       History of Fall in Last 3 Months Harris:  No     Functional Assessment  (12/03/2019 09:01 am)       Bathing ADL Index:  Independent (2)       Dressing ADL Index:  Independent (2)       Toileting ADL Index:  Independent (2)       Transferring Bed or Chair ADL Index:  Independent (2)       Continence ADL Index:  Independent (2)       Feeding ADL Index:  Independent (2)       ADL Index Score:  12     Depression Screening  Not recorded for selected visit.    Detailed Depression Screening  Not recorded for selected visit.    Geriatric Depression Screen  (12/03/2019  09:01 am)       Geriatric Depression Satisfied Life:  Yes       Geriatric Depression Dropped Activities:  No       Geriatric Depression Life Empty:  No       Geriatric Depression Bored:  No       Geriatric Depression Good Spirits:  Yes       Geriatric Depression Afraid Bad Things:  No       Geriatric Depression Feel Happy:  Yes       Geriatric Depression Feel Helpless:  No       Geriatric Depression Prefer to Stay Home:  No       Geriatric Depression Memory Problems:  No       Geriatric Depression Wonderful Be Alive:  Yes       Geriatric Depression Feel Worthless:  No       Geriatric Depression Situation Hopeless:  No       Geriatric Depression Others Better Off:  No       Geriatric Depression Full of Energy:  Yes       Geriatric Depression Total Score:  0     Hearing Screen  (12/03/2019 09:08 am)       Ear, Right Audiogram:  Fail       Ear, Left Audiogram:  Fail     Home Safety Screen  (12/03/2019 09:01 am)       Emergency Numbers Kept/Updated:  Yes       Aware of Smoking Dangers:  Yes       Smoke Alarms/Fire Extinguisher Available:  Yes       Household Members Fire Safety Knowledge:  Yes       Firearms Unloaded and Secure:  Yes       Floor Rugs Removed or Fastened:  No       Mats in Bathtub/Shower:  No       Stairway Rails or Banisters:  Yes       Outdoor Clutter Safety:  Yes       Indoor Clutter Safety:  Yes       Electrical Cord Safety:  Yes     Vision Screen  Not recorded for selected visit.     ADL's and Safety reviewed with patient.      Histories   Past Medical History:    Active  Diverticulosis (562.10): Onset on 7/1/2009 at 58 years.  Diabetes (250.00): Onset on 11/1/2007 at 56 years.  CAD (Coronary Artery Disease) (414.00): Onset on 12/1/2004 at 53 years.  Dyslipidemia (272.4): Onset on 5/1/2004 at 53 years.  Tinnitus (388.30): Onset on 10/18/2000 at 49 years.  Nephrolithiasis (955452845)  Resolved  Rib fracture ([id number]): Onset on 5/29/2014 at 63 years.   Resolved.  Comments:  8/12/2014 CDT 9:48 AM CDT - Arslan OROZCO, Austen chang accident in [facility]  Pneumonia (486): Onset in 1959 at 8 years.  Resolved on 7/27/2010 at 59 years.   Family History:    Patient was adopted.    Procedure history:    Colonoscopy (SNOMED CT 054687421) performed by Constantin Valdivia MD on 9/19/2016 at 65 Years.  Comments:  9/27/2016 8:12 AM CDT - Lisette Bradford  Indication: Surveillance.  Sedation:  MAC.  Impression: One 10 mm polyp in cecum; removed with hot snare, resected and retrieved.  One 12 mm polyp at the splenic flexure; removed with hot snare, resected and retrieved.  Recommendation:  Repeat in 1 year.  appendectomy performed by Constantin Valdivia MD on 1/15/2016 at 65 Years.  Comments:  1/27/2016 2:31 PM CST - Shalonda Dietz RN  Pathology: appendix with fibrous obliteration of lumen and sessile serrated adenoma margin negative  Colonoscopy to be done in 6 months to monitor site per surgeon Constantin Valdivia  Colonoscopy (SNOMED CT 031795543) performed by Robin Cole MD on 12/9/2015 at 65 Years.  Comments:  12/14/2015 3:03 PM CST - Naina Moreira MA  Indication:   personal hx adenomatous polyp  Sedation:   MAC  Findings:   tubular adenoma x1--size 5mm; sessile serrated adenoma x1--size 30mm  Recommendation:   f/u w/ general surgeon re: possible right hemicolectomy  Arthroplasty of knee (SNOMED CT 60641790) performed by Juan Luis Montero MD on 9/16/2014 at 63 Years.  Comments:  10/1/2014 6:58 AM CDT - Mana Amador  Right  Arthroplasty of knee (SNOMED CT 13810934) performed by Juan Luis Montero MD on 12/20/2013 at 63 Years.  Comments:  1/2/2014 7:09 AM Mana Whiteside  Left  Stress test ECG - treadmill (SNOMED CT 140428437) on 7/18/2013 at 62 Years.  Comments:  11/26/2013 12:23 PM CST Freda Zuniga CMA  Colonoscopy (SNOMED CT 810473864) in the month of 7/2009 at 58 Years.  Comments:  7/27/2010 2:35 PM CDT - Toña Person.  recheck 2014  Cardiovascular stress test  "[facility]) on 2/15/2007 at 56 Years.  Comments:  7/27/2010 2:51 PM CDT - Toña Person  No evidence of significant myocardial ischemia or infarction.  EF 65%  [facility] 23325[facility]) on 2/3/2006 at 55 Years.  Comments:  7/27/2010 2:34 PM CDT - Toña Person  Adenoma  [facility]) on 12/16/2005 at 55 Years.  Angioplasty (SNOMED CT 8111183) in the month of 12/2004 at 53 Years.  Lithotripsy (SNOMED CT 645388570) in 1996 at 46 Years.  Rt Shoulder \"Class 4\" Separation in 1978 at 28 Years.  Tonsillectomy and adenoidectomy (SNOMED CT 513459599) in 1954 at 4 Years.   Social History:        Alcohol Assessment: Current            Current, Wine (5 oz), 3-5 times per week      Tobacco Assessment: Past            Past      Home and Environment Assessment            Marital status: .  Spouse/Partner name: Sarah Pa.      Nutrition and Health Assessment            Type of diet: Diabetic.      Exercise and Physical Activity Assessment: Regular exercise            Exercise frequency: 3-4 times/week.  Exercise type: Bicycling, Walking.      Sexual Assessment            Sexually active: Yes.  Sexual orientation: Heterosexual.  Other contraceptive use: \"age\".        Physical Examination   Vital Signs   12/3/2019 9:01 AM CST Peripheral Pulse Rate 66 bpm    Systolic Blood Pressure 124 mmHg    Diastolic Blood Pressure 72 mmHg    Mean Arterial Pressure 89 mmHg      Measurements from flowsheet : Measurements   12/3/2019 9:01 AM CST Height Measured - Standard 71 in    Weight Measured - Standard 237.0 lb    BSA 2.32 m2    Body Mass Index 33.05 kg/m2  HI      General:  Alert and oriented, No acute distress.    Eye:  Pupils are equal, round and reactive to light, Normal conjunctiva.    HENT:  Tympanic membranes are clear, Oral mucosa is moist.    Neck:  Supple, Non-tender, No carotid bruit, No lymphadenopathy.    Respiratory:  " Lungs are clear to auscultation, Respirations are non-labored.    Cardiovascular:  Normal rate, Regular rhythm, Good pulses equal in all extremities, Normal peripheral perfusion, No edema.    Gastrointestinal:  Soft, Non-tender, Non-distended.    Genitourinary:  No scrotal tenderness, No inguinal tenderness, No urethral discharge.    Musculoskeletal:  Normal range of motion, Normal gait.    Integumentary:  Warm, No rash.    Neurologic:  Alert, Oriented, No focal deficits.    Cognition and Speech:  Oriented, Speech clear and coherent, Functional cognition intact.    Psychiatric:  Cooperative, Appropriate mood & affect, Normal judgment, Patient's GDS and CAGE questionnaire reviewed and discussed with patient. .       Review / Management   Results review:  Lab results   11/19/2019 7:58 AM CST Hgb A1c 7.5  HI    Cholesterol 142 mg/dL    Non-HDL Cholesterol 95    HDL 47 mg/dL    Cholesterol/HDL Ratio 3.0    LDL 72    Triglyceride 147 mg/dL    U Microalbumin 0.6 mg/dL    Ur Creatinine 171 mg/dL    Ur Microalbumin/Creatinine Ratio 4   .       Impression and Plan   Diagnosis     CAD (Coronary Artery Disease) (XAK62-MR I25.10).     Diabetes (WSZ16-AW E11.9).     Failed hearing screening (ZXR70-FC R94.120).     Physical exam (TBL26-RA Z00.00).     Course:  Progressing as expected.    Plan:  Discussed  preventative services.  Appropriate weight and diet discussed.  Discussed Advance Life Directives.         Refer to: i would like for him to increase his exercise.  before proceeding he will have exercise stress test.    Preventative services needed::  Preventative services checklist reviewed, updated and copy given to patient.  Please see scanned document.         HIV risk screening: No.    Patient Instructions:          Limitations: Limit caffeine intake, Limit alcohol consumption.         Counseled: Patient, Regarding treatment, Regarding medications, Diet, Activity, Verbalized understanding.    Orders     should be seen by  audiology and he will consider  discussed diabetic goals  will have yearly eye exam.     Diagnosis     Medicare annual wellness visit, initial (KBG18-VJ Z00.00).     Total time spent with patient and coordination of care:  _  minutes

## 2022-02-16 NOTE — CARE COORDINATION
Pt appears on [facility] chronic disease panel as out of parameters for DM/IVD.  Pt was seen in April 2018, has RTC for 7/2018 DM/Lab visit.  Wait for pt response then.
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
## 2022-02-16 NOTE — TELEPHONE ENCOUNTER
Entered by Geraldine Lee CMA on June 02, 2021 10:30:59 AM CDT  ---------------------  From: Geraldine Lee CMA   To: [facility] #[id number]    Sent: 6/2/2021 10:30:59 AM CDT  Subject: Medication Management     ** Submitted: **  Order:insulin glargine (Basaglar KwikPen 100 units/mL subcutaneous solution)  See Instructions  ADMINISTER 20 UNITS UNDER THE SKIN AT BEDTIME  Qty:  15 mL        Days Supply:  68        Refills:  1          Substitutions Allowed     Route To [facility] #[id number]    Signed by Geraldine Lee CMA  6/2/2021 3:28:00 PM [facility]    ** Submitted: **  Complete:insulin glargine (Basaglar KwikPen 100 units/mL subcutaneous solution)   Signed by Geraldine Lee CMA  6/2/2021 3:30:00 PM [facility]    ** Not Approved:  **  insulin glargine (BASAGLAR 100 U/ML KWIKPEN INJ 3ML)  ADMINISTER 20 UNITS UNDER THE SKIN AT BEDTIME  Qty:  15 mL        Days Supply:  68        Refills:  0          Substitutions Allowed     Route To [facility] [employer] #[id number]   Signed by Geraldine Lee CMA                ------------------------------------------  From: [facility] #[id number]  To: Austen Mcdaniels MD  Sent: May 30, 2021 3:49:10 AM CDT  Subject: Medication Management  Due: May 14, 2021 8:13:56 PM CDT     ** On Hold Pending Signature **     Dispensed Drug: insulin glargine (Basaglar KwikPen 100 units/mL subcutaneous solution), ADMINISTER 20 UNITS UNDER THE SKIN AT BEDTIME  Quantity: 15 mL  Days Supply: 68  Refills: 0  Substitutions Allowed  Notes from Pharmacy:  ------------------------------------------Date of last office visit and reason:  2/21/21      Date of last Med Check / Px:   12/28/20  Date of last labs pertaining to med:  12/28/21    Rx states okay to fill through June, pt using 20 units per day, filled for 1 month.    RTC placed.

## 2022-02-16 NOTE — TELEPHONE ENCOUNTER
---------------------  From: Carlito Teague MD   To: LENNOX SAM    Sent: 12/30/2021 7:03:41 AM CST  Subject: General Message     Your a1c is still too high. Please increase your insulin by 3 units, see Adeola Suárez and recheck in 3 months.  Please let us know you received this message by either selecting Forward or Reply at the top.  Thank you      Results:  Date Result Name Ind Value Ref Range   12/29/2021 9:50 AM Hgb A1c ((H)) 8.9 ( - <5.7)

## 2022-02-16 NOTE — LETTER
(Inserted Image. Unable to display)   December 09, 2021  PETER DAHM  [address] [place] [address]        Dear LENNOX,    Thank you for selecting [facility] for your healthcare needs.    Our records indicate you are due for the following services:     Diabetic Exam ~ Please bring your glucose meter and/or your blood glucose diary to your appointment.    (FYI   Regarding office visits: In some instances, a video visit or telephone visit may be offered as an option.)    To schedule an appointment or if you have further questions, please contact your clinic at [phone].    Powered by [employer]    Sincerely,    Austen Mcdaniels MD

## 2022-02-16 NOTE — TELEPHONE ENCOUNTER
---------------------  From: Freda Sanders CMA ([facility] Message Pool ([id number]_[facility]))   To: Austen Mcdaniels MD;     Sent: 2/22/2021 9:19:13 AM CST  Subject: FW: General Message           ---------------------  From: Shreya Donis   To: [facility] Message Pool ([id number]_[place] [facility] [place]);     Sent: 2/19/2021 10:52:06 AM CST !  Subject: General Message     Preop note is not complete and [facility] is calling for it. Please complete ASAP as surgery is on Tuesday, very early morning. thanks
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
## 2022-02-16 NOTE — TELEPHONE ENCOUNTER
Entered by Karla Ozuna RN on June 09, 2020 2:38:29 PM CDT  ---------------------  From: Karla Ozuna RN   To: [facility] #[id number]    Sent: 6/9/2020 2:38:29 PM CDT  Subject: Medication Management     ** Not Approved: refilled for 30 days. pt is due for appointment. **  metoprolol  TAKE 1 TABLET BY MOUTH TWICE DAILY  Qty:  180 tab(s)        Refills:  0          Substitutions Allowed     Details:  180 tab(s), TAKE 1 TABLET BY MOUTH TWICE DAILY, Route to Pharmacy Electronically [facility] #[id number], 6/8/2020, 6/8/2020, 90, **Patient requests 90 days supply**, Austen Mcdaniels MD      Route To Pharmacy - [facility] #[id number]   Note from Pharmacy:  **Patient requests 90 days supply**  Signed by Karla Ozuna RN            ** Not Approved: Refill not appropriate, refilled for 30 days - pt is due for appointment. **  simvastatin  TAKE 1 TABLET BY MOUTH EVERY NIGHT AT BEDTIME  Qty:  90 tab(s)        Refills:  0          Substitutions Allowed     Details:  90 tab(s), TAKE 1 TABLET BY MOUTH EVERY NIGHT AT BEDTIME, Route to Pharmacy Electronically [facility] #[id number], 6/8/2020, 6/8/2020, 90, **Patient requests 90 days supply**, Austen Mcdaniels MD      Route To Pharmacy - [facility] #[id number]   Note from Pharmacy:  **Patient requests 90 days supply**  Signed by Karla Ozuna RN            ------------------------------------------  From: [facility] #[id number]  To: Austen Mcdaniels MD  Sent: June 8, 2020 10:05:32 AM CDT  Subject: Medication Management  Due: June 5, 2020 4:00:35 PM CDT     ** On Hold Pending Signature **     Dispensed Drug: metoprolol (Metoprolol Tartrate 25 mg oral tablet), TAKE 1 TABLET BY MOUTH TWICE DAILY  Quantity: 180 tab(s)  Days Supply: 90  Refills: 0  Substitutions Allowed  Notes from Pharmacy: **Patient requests 90 days supply**     ** On Hold Pending Signature **     Dispensed Drug: simvastatin (simvastatin 80 mg oral tablet), TAKE 1 TABLET BY MOUTH EVERY NIGHT  AT BEDTIME  Quantity: 90 tab(s)  Days Supply: 90  Refills: 0  Substitutions Allowed  Notes from Pharmacy: **Patient requests 90 days supply**  ------------------------------------------
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
## 2022-02-16 NOTE — TELEPHONE ENCOUNTER
Entered by Gonzalo Lo LPN on April 05, 2021 1:20:25 PM CDT  ---------------------  From: Gonzalo Lo LPN   [id number]    Sent: 4/5/2021 1:20:25 PM CDT  Subject: Medication Management     ** Submitted: **  Order:dulaglutide (Trulicity Pen 1.5 mg/0.5 mL subcutaneous solution)  1.5 mg  Subcutaneous  qweek  Qty:  6 mL        Refills:  0          Substitutions Allowed     [id number]    Signed by Gonzalo Lo LPN  4/5/2021 6:16:00 PM [facility]    ** Submitted: **  Complete:dulaglutide (Trulicity Pen 1.5 mg/0.5 mL subcutaneous solution)   Signed by Gonzalo Lo LPN  4/5/2021 6:20:00 PM [facility]    ** Not Approved:  **  dulaglutide (TRULICITY 1.5MG/0.5ML SDP 0.5ML)  ADMINISTER 1.5 MG UNDER THE SKIN EVERY WEEK  Qty:  2 mL        Days Supply:  28        Refills:  0          Substitutions Allowed     [id number]   Signed by Gonzalo Lo LPN            ------------------------------------------  [id number]  To: Austen Mcdaniels MD  Sent: April 4, 2021 3:46:09 AM CDT  Subject: Medication Management  Due: March 24, 2021 8:28:52 PM CDT     ** On Hold Pending Signature **     Dispensed Drug: dulaglutide (Trulicity Pen 1.5 mg/0.5 mL subcutaneous solution), ADMINISTER 1.5 MG UNDER THE SKIN EVERY WEEK  Quantity: 2 mL  Days Supply: 28  Refills: 0  Substitutions Allowed  Notes from Pharmacy:  ------------------------------------------Medication Refill    PCP:   _ [facility]    Name of med requested:  _ Trulicity    Date of last office visit and reason:  _ 2-18-21 Pre-Op      Date of last Med Check / Px:   _ 12-    Date of last labs pertaining to med:  _ 12-    RTC order in chart:  _ Yes    For Protocol refill, has patient been contacted:  _

## 2022-02-16 NOTE — NURSING NOTE
Medicare Visit Entered On:  12/3/2019 9:05 AM CST    Performed On:  12/3/2019 9:01 AM CST by Freda Sanders CMA               Summary   Chief Complaint :   AWV   Weight Measured :   237.0 lb(Converted to: 237 lb 0 oz, 107.50 kg)    Height Measured :   71 in(Converted to: 5 ft 11 in, 180.34 cm)    Body Mass Index :   33.05 kg/m2 (HI)    Body Surface Area :   2.32 m2   Systolic Blood Pressure :   124 mmHg   Diastolic Blood Pressure :   72 mmHg   Mean Arterial Pressure :   89 mmHg   Peripheral Pulse Rate :   66 bpm   Freda Sanders CMA - 12/3/2019 9:01 AM CST   Health Status   Allergies Verified? :   Yes   Medication History Verified? :   Yes   Immunizations Current :   Yes   Pre-Visit Planning Status :   Completed   Tobacco Use? :   Former smoker   Freda Sanders CMA - 12/3/2019 9:01 AM CST   Demographics   Last Name :   Sevenvinny   Address :   [address] [place] [address]   First Name :   Constantin Sharp Initial :   FACUNDO   Responsible Party Date of Birth () :   1950 CST   City :   [place]   State :   [place]   Zip Code :   [address]   Freda Sanders CMA - 12/3/2019 10:28 AM CST   Contact Informtion Grid   Name :    No other providers                Freda Sanders CMA - 12/3/2019 10:28 AM CST         Consents   Consent for Immunization Exchange :   Consent Granted   Consent for Immunizations to Providers :   Consent Granted   Freda Sanders CMA - 12/3/2019 9:01 AM CST   Problems   (As Of: 12/3/2019 10:30:48 AM CST)   Problems(Active)    CAD (Coronary Artery Disease) (ICD-9-CM  :414.00 )  Name of Problem:   CAD (Coronary Artery Disease) ; Onset Date:   2004 ; Recorder:   Freda Sanders CMA; Confirmation:   Confirmed ; Classification:   Medical ; Code:   414.00 ; Contributor System:   PowerChart ; Last Updated:   2014 7:03 PM CST ; Life Cycle Date:   2010 ; Life Cycle Status:   Active ; Vocabulary:   ICD-9-CM        Diabetes (ICD-9-CM  :250.00 )  Name of Problem:   Diabetes ; Onset  Date:   11/1/2007 ; Recorder:   Freda Sanders CMA; Confirmation:   Confirmed ; Classification:   Medical ; Code:   250.00 ; Contributor System:   PowerChart ; Last Updated:   2/28/2014 7:03 PM CST ; Life Cycle Date:   1/7/2010 ; Life Cycle Status:   Active ; Vocabulary:   ICD-9-CM        Diverticulosis (ICD-9-CM  :562.10 )  Name of Problem:   Diverticulosis ; Onset Date:   7/1/2009 ; Recorder:   Freda Sanders CMA; Confirmation:   Confirmed ; Classification:   Medical ; Code:   562.10 ; Contributor System:   PowerChart ; Last Updated:   2/28/2014 7:03 PM CST ; Life Cycle Date:   1/7/2010 ; Life Cycle Status:   Active ; Vocabulary:   ICD-9-CM        Dyslipidemia (ICD-9-CM  :272.4 )  Name of Problem:   Dyslipidemia ; Onset Date:   5/1/2004 ; Recorder:   Freda Sanders CMA; Confirmation:   Confirmed ; Classification:   Medical ; Code:   272.4 ; Contributor System:   PowerChart ; Last Updated:   2/28/2014 7:03 PM CST ; Life Cycle Date:   1/7/2010 ; Life Cycle Status:   Active ; Vocabulary:   ICD-9-CM        Knee arthropathy (SNOMED CT  :6851371429 )  Name of Problem:   Knee arthropathy ; Onset Date:   9/16/2014 ; Recorder:   Freda Sanders CMA; Confirmation:   Confirmed ; Classification:   Medical ; Code:   2393811041 ; Contributor System:   PowerChart ; Last Updated:   9/20/2014 9:52 AM CDT ; Life Cycle Date:   9/20/2014 ; Life Cycle Status:   Active ; Vocabulary:   SNOMED CT        Long term current use of oral hypoglycemic drug (SNOMED CT  :846815519 )  Name of Problem:   Long term current use of oral hypoglycemic drug ; Recorder:   Ignacia Correia; Confirmation:   Confirmed ; Classification:   Medical ; Code:   447198379 ; Contributor System:   PowerChart ; Last Updated:   4/16/2018 9:35 AM CDT ; Life Cycle Date:   4/16/2018 ; Life Cycle Status:   Active ; Vocabulary:   SNOMED CT        Nephrolithiasis (SNOMED CT  :452093429 )  Name of Problem:   Nephrolithiasis ; Recorder:   Raza YOUNG  Toña; Confirmation:   Confirmed ; Classification:   Medical ; Code:   022402374 ; Contributor System:   Sellywhere ; Last Updated:   8/19/2016 3:20 PM CDT ; Life Cycle Date:   7/27/2010 ; Life Cycle Status:   Active ; Vocabulary:   SNOMED CT        Obesity (ICD-9-CM  :278.00 )  Name of Problem:   Obesity ; Recorder:   SYSTEM; Confirmation:   Probable ; Classification:   Medical ; Code:   278.00 ; Last Updated:   2/28/2014 7:03 PM CST ; Life Cycle Date:   5/25/2011 ; Life Cycle Status:   Active ; Vocabulary:   ICD-9-CM        Tinnitus (ICD-9-CM  :388.30 )  Name of Problem:   Tinnitus ; Onset Date:   10/18/2000 ; Recorder:   Carmen Velazquez; Confirmation:   Confirmed ; Classification:   Medical ; Code:   388.30 ; Contributor System:   Sellywhere ; Last Updated:   5/24/2011 9:13 AM CDT ; Life Cycle Date:   5/24/2011 ; Life Cycle Status:   Active ; Vocabulary:   ICD-9-CM        Total replacement of right knee joint (SNOMED CT  :1033667621 )  Name of Problem:   Total replacement of right knee joint ; Recorder:   Laura Jaramillo CMA; Confirmation:   Confirmed ; Classification:   Medical ; Code:   2687763608 ; Contributor System:   Sellywhere ; Last Updated:   2/28/2014 7:03 PM CST ; Life Cycle Date:   12/26/2013 ; Life Cycle Status:   Active ; Vocabulary:   [email or web address] CT        Use of anticoagulation (SNOMED CT  :088290300 )  Name of Problem:   Use of anticoagulation ; Recorder:   Laura Jaramillo CMA; Confirmation:   Confirmed ; Classification:   Medical ; Code:   127387103 ; Contributor System:   Sellywhere ; Last Updated:   2/28/2014 7:03 PM CST ; Life Cycle Date:   12/26/2013 ; Life Cycle Status:   Active ; Vocabulary:   SNOMED CT          Diagnoses(Active)    CAD (Coronary Artery Disease)  Date:   12/3/2019 ; Diagnosis Type:   Discharge ; Confirmation:   Confirmed ; Clinical Dx:   CAD (Coronary Artery Disease) ; Classification:   Medical ; Code:   ICD-10-CM ; Probability:   0 ; Diagnosis Code:   I25.10      Diabetes  Date:  "  12/3/2019 ; Diagnosis Type:   Discharge ; Confirmation:   Confirmed ; Clinical Dx:   Diabetes ; Classification:   Medical ; Code:   ICD-10-CM ; Probability:   0 ; Diagnosis Code:   E11.9      Encounter for immunization  Date:   12/3/2019 ; Diagnosis Type:   Discharge ; Confirmation:   Confirmed ; Clinical Dx:   Encounter for immunization ; Classification:   Medical ; Clinical Service:   Non-Specified ; Code:   ICD-10-CM ; Probability:   0 ; Diagnosis Code:   Z23      Failed hearing screening  Date:   12/3/2019 ; Diagnosis Type:   Discharge ; Confirmation:   Confirmed ; Clinical Dx:   Failed hearing screening ; Classification:   Medical ; Clinical Service:   Non-Specified ; Code:   ICD-10-CM ; Probability:   0 ; Diagnosis Code:   R94.120        Procedures / Surgeries        -    Procedure History   (As Of: 12/3/2019 10:30:48 AM CST)     Procedure Dt/Tm:   2/3/2006 ; Anesthesia Minutes:   0 ; Procedure Name:   Colonoscopy ; Procedure Minutes:   0 ; Comments:     7/27/2010 2:34 PM Toña Norman  [place] ; Last Reviewed Dt/Tm:   12/3/2019 10:30:24 AM CST            Procedure Dt/Tm:   12/2004 ; Anesthesia Minutes:   0 ; Procedure Name:   Angioplasty ; Procedure Minutes:   0 ; Last Reviewed Dt/Tm:   12/3/2019 10:30:24 AM CST            Procedure Dt/Tm:   2/15/2007 ; Anesthesia Minutes:   0 ; Procedure Name:   Cardiovascular stress test using the dobutamine stress test protocol ; Procedure Minutes:   0 ; Comments:     7/27/2010 2:51 PM Toña Norman  No evidence of significant myocardial ischemia or infarction.  EF 65% ; Last Reviewed Dt/Tm:   12/3/2019 10:30:24 AM CST            Procedure Dt/Tm:   1978 ; Anesthesia Minutes:   0 ; Procedure Name:   Rt Shoulder \"Class 4\" Separation ; Procedure Minutes:   0 ; Last Reviewed Dt/Tm:   12/3/2019 10:30:24 AM CST            Procedure Dt/Tm:   1954 ; Anesthesia Minutes:   0 ; Procedure Name:   Tonsillectomy and adenoidectomy ; Procedure Minutes:   0 ; Last " Reviewed Dt/Tm:   12/3/2019 10:30:24 AM CST            Procedure Dt/Tm:   1996 ; Anesthesia Minutes:   0 ; Procedure Name:   Lithotripsy ; Procedure Minutes:   0 ; Last Reviewed Dt/Tm:   12/3/2019 10:30:24 AM CST            Procedure Dt/Tm:   07/2009 ; Anesthesia Minutes:   0 ; Procedure Name:   Colonoscopy ; Procedure Minutes:   0 ; Comments:     7/27/2010 2:35 PM CDT - Toña Person  normal.  recheck 2014 ; Last Reviewed Dt/Tm:   12/3/2019 10:30:24 AM CST            Procedure Dt/Tm:   3/12/2009 12:00:00 AM CDT ; Anesthesia Minutes:   0 ; Procedure Name:   Dilated retinal eye exam with interpretation by an ophthalmologist or optometrist documented and reviewed (DM) ; Procedure Minutes:   0 ; Clinical Service:   Non-Specified            Procedure Dt/Tm:   9/23/2010 12:00:00 AM CDT ; Anesthesia Minutes:   0 ; Procedure Name:   Dilated retinal eye exam with interpretation by an ophthalmologist or optometrist documented and reviewed (DM) ; Procedure Minutes:   0 ; Comments:     9/23/2010 4:57 PM CDT - Toña Person  No DM Retin.  +cataracts. ; Clinical Service:   Non-Specified            Procedure Dt/Tm:   12/16/2005 ; Anesthesia Minutes:   0 ; Procedure Name:   Flexible sigmoidoscopy ; Procedure Minutes:   0 ; Last Reviewed Dt/Tm:   12/3/2019 10:30:24 AM CST            Procedure Dt/Tm:   7/18/2013 ; Anesthesia Minutes:   0 ; Procedure Name:   Stress test ECG - treadmill ; Procedure Minutes:   0 ; Comments:     11/26/2013 12:23 PM CST - Freda Sanders CMA ; Last Reviewed Dt/Tm:   12/3/2019 10:30:24 AM CST            Procedure Dt/Tm:   12/20/2013 ; Anesthesia Minutes:   0 ; Procedure Name:   Arthroplasty of knee ; Procedure Minutes:   0 ; Comments:     1/2/2014 7:09 AM CST - Mana Amador ; Last Reviewed Dt/Tm:   12/3/2019 10:30:24 AM CST            Procedure Dt/Tm:   9/16/2014 ; Anesthesia Minutes:   0 ; Procedure Name:   Arthroplasty of knee ; Procedure Minutes:   0 ; Comments:     10/1/2014  6:58 AM CDT - Mana Amador ; Last Reviewed Dt/Tm:   12/3/2019 10:30:24 AM CST            Procedure Dt/Tm:   12/9/2015 ; Provider:   Robin Cole MD; Anesthesia Minutes:   0 ; Procedure Name:   Colonoscopy ; Procedure Minutes:   0 ; Comments:     12/14/2015 3:03 PM CST - Harish MICHAELS, Naina  Indication:   personal hx adenomatous polyp  Sedation:   MAC  Findings:   tubular adenoma x1--size 5mm; sessile serrated adenoma x1--size 30mm  Recommendation:   f/u w/ general surgeon re: possible right hemicolectomy ; Last Reviewed Dt/Tm:   12/3/2019 10:30:24 AM CST            Procedure Dt/Tm:   1/15/2016 ; Anesthesia Minutes:   0 ; Procedure Name:   appendectomy ; Procedure Minutes:   0 ; Comments:     1/27/2016 2:31 PM CST - Shalonda Dietz RN  Pathology: appendix with fibrous obliteration of lumen and sessile serrated adenoma margin negative  Colonoscopy to be done in 6 months to monitor site per surgeon Constantin Valdivia ; Last Reviewed Dt/Tm:   12/3/2019 10:30:24 AM CST            Procedure Dt/Tm:   9/19/2016 ; Anesthesia Minutes:   0 ; Procedure Name:   Colonoscopy ; Procedure Minutes:   0 ; Comments:     9/27/2016 8:12 AM CDT - Lisette Bradford  Indication: Surveillance.  Sedation:  MAC.  Impression: One 10 mm polyp in cecum; removed with hot snare, resected and retrieved.  One 12 mm polyp at the splenic flexure; removed with hot snare, resected and retrieved.  Recommendation:  Repeat in 1 year. ; Last Reviewed Dt/Tm:   12/3/2019 10:30:24 AM CST            Meds / Allergies   (As Of: 12/3/2019 10:30:48 AM CST)   Allergies (Active)   No Known Medication Allergies  Estimated Onset Date:   Unspecified ; Created By:   Toña Person CMA; Reaction Status:   Active ; Category:   Drug ; Substance:   No Known Medication Allergies ; Type:   Allergy ; Updated By:   Toña Person CMA; Reviewed Date:   12/3/2019 10:30 AM CST        Medication List   (As Of: 12/3/2019 10:30:48 AM CST)   Prescription/Discharge Order     acetaminophen-hydrocodone  :   acetaminophen-hydrocodone ; Status:   Completed ; Ordered As Mnemonic:   Norco 5 mg-325 mg oral tablet ; Simple Display Line:   1 tab(s), PO, q4hr, 24 tab(s), PRN: for pain ; Ordering Provider:   Austen Mcdaniels MD; Catalog Code:   acetaminophen-hydrocodone ; Order Dt/Tm:   8/12/2014 9:49:57 AM CDT          dulaglutide  :   dulaglutide ; Status:   Prescribed ; Ordered As Mnemonic:   Trulicity Pen 1.5 mg/0.5 mL subcutaneous solution ; Simple Display Line:   See Instructions, INJECT 1.5 MG SUBCUTANEOUSLY EVERY WEEK, 2 mL, 5 Refill(s) ; Ordering Provider:   Austen Mcdaniels MD; Catalog Code:   dulaglutide ; Order Dt/Tm:   12/3/2019 10:16:39 AM CST          dulaglutide  :   dulaglutide ; Status:   Completed ; Ordered As Mnemonic:   Trulicity Pen 1.5 mg/0.5 mL subcutaneous solution ; Simple Display Line:   See Instructions, INJECT 1.5 MG SUBCUTANEOUSLY EVERY WEEK, 2 mL, 7 Refill(s) ; Ordering Provider:   Austen Mcdaniels MD; Catalog Code:   dulaglutide ; Order Dt/Tm:   3/20/2019 9:37:14 AM CDT          insulin glargine  :   insulin glargine ; Status:   Prescribed ; Ordered As Mnemonic:   Basaglar KwikPen 100 units/mL subcutaneous solution ; Simple Display Line:   20 unit(s), Subcutaneous, hs, 15 mL, 3 Refill(s) ; Ordering Provider:   Austen Mcdaniels MD; Catalog Code:   insulin glargine ; Order Dt/Tm:   12/3/2019 10:17:14 AM CST          insulin glargine  :   insulin glargine ; Status:   Completed ; Ordered As Mnemonic:   Basaglar KwikPen 100 units/mL subcutaneous solution ; Simple Display Line:   20 unit(s), Subcutaneous, hs, 15 mL, 3 Refill(s) ; Ordering Provider:   Austen Mcdaniels MD; Catalog Code:   insulin glargine ; Order Dt/Tm:   3/20/2019 9:36:23 AM CDT          metFORMIN  :   metFORMIN ; Status:   Prescribed ; Ordered As Mnemonic:   metFORMIN 1000 mg oral tablet ; Simple Display Line:   1,000 mg, 1 tab(s), Oral, bid, 180 tab(s), 1 Refill(s) ; Ordering Provider:   Arslan  Austen OROZCO; Catalog Code:   metFORMIN ; Order Dt/Tm:   12/3/2019 10:17:47 AM CST          metFORMIN  :   metFORMIN ; Status:   Completed ; Ordered As Mnemonic:   metFORMIN 1000 mg oral tablet ; Simple Display Line:   1,000 mg, 1 tab(s), Oral, bid, 60 tab(s), 0 Refill(s) ; Ordering Provider:   Austen Mcdaniels MD; Catalog Code:   metFORMIN ; Order Dt/Tm:   11/22/2019 2:51:08 PM CST          metoprolol  :   metoprolol ; Status:   Prescribed ; Ordered As Mnemonic:   Metoprolol Tartrate 25 mg oral tablet ; Simple Display Line:   25 mg, 1 tab(s), Oral, bid, 180 tab(s), 1 Refill(s) ; Ordering Provider:   Austen Mcdaniels MD; Catalog Code:   metoprolol ; Order Dt/Tm:   12/3/2019 10:18:14 AM CST          metoprolol  :   metoprolol ; Status:   Completed ; Ordered As Mnemonic:   Metoprolol Tartrate 25 mg oral tablet ; Simple Display Line:   25 mg, 1 tab(s), Oral, bid, for 90 day(s), 180 tab(s), 1 Refill(s) ; Ordering Provider:   Austen Mcdaniels MD; Catalog Code:   metoprolol ; Order Dt/Tm:   3/20/2019 9:37:32 AM CDT          Miscellaneous Prescription  :   Miscellaneous Prescription ; Status:   Prescribed ; Ordered As Mnemonic:   diabetic test strips ; Simple Display Line:   See Instructions, test bid, 1 bottle(s) ; Ordering Provider:   Austen Mcdaniels MD; Catalog Code:   Miscellaneous Prescription ; Order Dt/Tm:   4/8/2015 2:43:52 PM CDT          Miscellaneous Rx Supply  :   Miscellaneous Rx Supply ; Status:   Prescribed ; Ordered As Mnemonic:   Freestyle Kaycee monitor ; Simple Display Line:   See Instructions, Use as needed for glucose monitoring, 14 day monitor, 2 EA, 11 Refill(s) ; Ordering Provider:   Austen Mcdaniels MD; Catalog Code:   Miscellaneous Rx Supply ; Order Dt/Tm:   3/27/2019 5:02:37 PM CDT          Miscellaneous Rx Supply  :   Miscellaneous Rx Supply ; Status:   Prescribed ; Ordered As Mnemonic:   Freestyle Kaycee patches ; Simple Display Line:   See Instructions, apply every 14 days, 3 EA, 6 Refill(s)  ; Ordering Provider:   Austen Mcdaniels MD; Catalog Code:   Miscellaneous Rx Supply ; Order Dt/Tm:   3/20/2019 9:35:37 AM CDT          simvastatin  :   simvastatin ; Status:   Prescribed ; Ordered As Mnemonic:   simvastatin 80 mg oral tablet ; Simple Display Line:   80 mg, 1 tab(s), Oral, hs, 90 tab(s), 1 Refill(s) ; Ordering Provider:   Austen Mcdaniels MD; Catalog Code:   simvastatin ; Order Dt/Tm:   12/3/2019 10:19:49 AM CST          simvastatin  :   simvastatin ; Status:   Completed ; Ordered As Mnemonic:   simvastatin 80 mg oral tablet ; Simple Display Line:   80 mg, 1 tab(s), Oral, hs, for 90 day(s), 90 tab(s), 1 Refill(s) ; Ordering Provider:   Austen Mcdaniels MD; Catalog Code:   simvastatin ; Order Dt/Tm:   3/20/2019 9:37:50 AM CDT            Home Meds    aspirin  :   aspirin ; Status:   Documented ; Ordered As Mnemonic:   aspirin ; Simple Display Line:   81 mg, po, daily ; Catalog Code:   aspirin ; Order Dt/Tm:   9/20/2014 9:41:12 AM CDT            Satellite Meds    influenza virus vaccine, inactivated  :   influenza virus vaccine, inactivated ; Status:   Completed ; Ordered As Mnemonic:   Flublok Quadrivalent [id number] ; Simple Display Line:   0.5 mL, IM, once ; Ordering Provider:   Austen Mcdaniels MD; Catalog Code:   influenza virus vaccine, inactivated ; Order Dt/Tm:   12/3/2019 10:26:59 AM CST ; Comment:   Ages 55+            Family History   Family History   (As Of: 12/3/2019 10:30:48 AM CST)   Patient is Adopted     Social History   Social History   (As Of: 12/3/2019 10:30:48 AM CST)   Alcohol:  Current      Current, Wine (5 oz), 3-5 times per week   (Last Updated: 7/3/2013 4:03:48 PM CDT by Toña Person CMA)          Tobacco:  Past      Past   (Last Updated: 7/3/2013 4:04:12 PM CDT by Toña Person CMA)          Home/Environment:        Marital status: .  Spouse/Partner name: Sarah Morales Iva   (Last Updated: 7/3/2013 4:03:38 PM CDT by Roque Person CMA         "  Nutrition/Health:        Type of diet: Diabetic.   (Last Updated: 7/3/2013 4:04:04 PM CDT by Toña Person CMA)          Exercise:  Regular exercise      Exercise frequency: 3-4 times/week.  Exercise type: Bicycling, Walking.   (Last Updated: 12/12/2012 5:06:34 PM CST by Sofia Aguirre CMA)          Sexual:        Sexually active: Yes.  Sexual orientation: Heterosexual.  Other contraceptive use: \"age\".   (Last Updated: 12/12/2012 5:05:58 PM CST by Sofia Aguirre CMA)            Geriatric Depression Screening   Geriatric Depression Satisfied Life :   Yes   Geriatric Depression Dropped Activities :   No   Geriatric Depression Life Empty :   No   Geriatric Depression Bored :   No   Geriatric Depression Good Spirits :   Yes   Geriatric Depression Afraid Bad Things :   No   Geriatric Depression Feel Happy :   Yes   Geriatric Depression Feel Helpless :   No   Geriatric Depression Prefer to Stay Home :   No   Geriatric Depression Memory Problems :   No   Geriatric Depression Wonderful Be Alive :   Yes   Geriatric Depression Feel Worthless :   No   Geriatric Depression Situation Hopeless :   No   Geriatric Depression Others Better Off :   No   Geriatric Depression Full of Energy :   Yes   Geriatric Depression Total Score :   0    Freda Sanders CMA - 12/3/2019 10:28 AM CST   Hearing and Vision Screening   Audiogram Result Right Ear :   Fail   Audiogram Result Left Ear :   Fail   Freda Sanders CMA - 12/3/2019 10:28 AM CST   Home Safety Screen   Emergency Numbers Kept/Updated :   Yes   Aware of Smoking Dangers :   Yes   Smoke Alarms/Fire Extinguisher Available :   Yes   Household Members Fire Safety Knowledge :   Yes   Firearms Unloaded and Secure :   Yes   Floor Rugs Removed or Fastened :   No   Mats in Bathtub/Shower :   No   Stairway Rails or Banisters :   Yes   Outdoor Clutter Safety :   Yes   Indoor Clutter Safety :   Yes   Electrical Cord Safety :   Yes   Freda Sanders CMA - 12/3/2019 10:28 AM " CST   Harris Fall Risk   History of Fall in Last 3 Months Harris :   No   Freda Sanders CMA - 12/3/2019 10:28 AM CST   Functional Assessment   Focused Functional Assessment Grid   Bathing :   Independent (2)   Dressing :   Independent (2)   Toileting :   Independent (2)   Transferring Bed or Chair :   Independent (2)   Continence :   Independent (2)   Feeding :   Independent (2)   Freda Sanders CMA - 12/3/2019 10:28 AM CST   ADL Index Score :   12    Capable of Shopping :   Yes   Capable of Walking :   Yes   Capable of Housekeeping :   Yes   Capable of Managing Medications :   Yes   Capable of Handling Finances :   Yes   Freda Sanders CMA - 12/3/2019 10:28 AM CST

## 2022-02-16 NOTE — TELEPHONE ENCOUNTER
Order, notes and insurance card are faxed to [facility] and they will contact patient and schedule.

## 2022-02-16 NOTE — LETTER
(Inserted Image. Unable to display)     February 18, 2019      PETER DAHM  [address]   [place], [place] [id number]          Dear ELNNOX,      Thank you for selecting [facility] ([facility]viously [facility] [facility] needs.      Our records indicate you are due for the following services:    Diabetic Exam ~ Please bring your glucose meter and/or your blood glucose diary to your appointment.      To schedule an appointment or if you have further questions, please contact your primary clinic:   [facility]       [phone]   [facility]       [phone]              [facility]     [phone]      Powered by [employer]    Sincerely,    Austen Mcdaniels MD

## 2022-02-16 NOTE — NURSING NOTE
Hearing and Vision Screening Entered On:  12/3/2019 9:08 AM CST    Performed On:  12/3/2019 9:08 AM CST by Freda Sanders CMA               Hearing and Vision Screening   Audiogram Result Right Ear :   Fail   Audiogram Result Left Ear :   Fail   Freda Sanders CMA - 12/3/2019 9:08 AM CST

## 2022-02-16 NOTE — TELEPHONE ENCOUNTER
Entered by Ariela Franco RN on September 29, 2020 10:03:34 AM CDT  ---------------------  From: Ariela Franco RN   To: [facility] #[id number]    Sent: 9/29/2020 10:03:34 AM CDT  Subject: Medication Management     ** Submitted: **  Order:dulaglutide (Trulicity Pen 1.5 mg/0.5 mL subcutaneous solution)  See Instructions  INJECT 1.5 MG EVERY WEEK  Qty:  2 mL        Days Supply:  28        Refills:  2          Substitutions Allowed     Route To [facility]  [facility] #[id number]    Signed by Ariela Franco RN  9/29/2020 3:02:00 PM [facility]    ** Submitted: **  Complete:dulaglutide (Trulicity Pen 1.5 mg/0.5 mL subcutaneous solution)   Signed by Ariela Franco RN  9/29/2020 3:03:00 PM [facility]    ** Not Approved:  **  dulaglutide (TRULICITY 1.5MG/0.5ML SDP 4X0.5ML)  INJECT 1.5 MG EVERY WEEK  Qty:  2 mL        Days Supply:  28        Refills:  0          Substitutions Allowed     Route To [facility] #[id number]   Signed by Ariela Franco RN            ------------------------------------------  From: [facility] #[id number]  To: Austen Mcdaniels MD  Sent: September 27, 2020 6:24:55 AM CDT  Subject: Medication Management  Due: September 9, 2020 4:21:06 PM CDT     ** On Hold Pending Signature **     Dispensed Drug: dulaglutide (Trulicity Pen 1.5 mg/0.5 mL subcutaneous solution), INJECT 1.5 MG EVERY WEEK  Quantity: 2 mL  Days Supply: 28  Refills: 0  Substitutions Allowed  Notes from Pharmacy:  ------------------------------------------Pt up to date on visit for DM with BERNARDO  EBER in chart.  Due for visit in 3 months.  Filled 3 month supply.

## 2022-02-16 NOTE — TELEPHONE ENCOUNTER
---------------------  From: Keke Mack CMA (Phone Messages Pool ([id number]_[facility]))   To: Carlito Teague MD;     Sent: 12/30/2021 7:53:57 AM CST  Subject: FW: General Message           ---------------------  From: LENNOX SAM  To: [facility]  Sent: 12/30/2021 07:19 a.m. CST  Subject: FW: General Message  Received  ---------------------  From: Carlito Teague MD  To: LENNOX SAM  Sent: 12/30/2021 7:03:41 AM CST  Subject: General Message       Your a1c is still too high. Please increase your insulin by 3 units, see Adeola Suárez and recheck in 3 months.  Please let us know you received this message by either selecting Forward or Reply at the top.  Thank you            Results:  Date Result Name Ind Value Ref Range   12/29/2021 9:50 AM Hgb A1c ((H)) 8.9 ( - <5.7)
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
## 2022-02-16 NOTE — TELEPHONE ENCOUNTER
---------------------  From: Jodi Macdonald RN (Phone Messages Pool ([id number]_[facility]))   To: BERNARDO Message Pool ([id number]_[place]);     Sent: 9/20/2021 3:45:58 PM CDT  Subject: Test Strips        PCP:   BERNARDO      Time of Call:  3:29       Person Calling:  Pt  Phone number:  [phone]    Note:   Pt calling asking for assistance with ordering his test strips.  Pt says that the prescription was sent to [facility] and they kept telling him that there was a delay.  Then after a bit of that, they told him that his insurance denied it so he was confused on what is going on or how to move forward.  They told him to call his PCP to discuss.     Last office visit and reason:  8/2/21 DiabetesSpoke to pharmacist, since pt is on medicare and testing QID, they need a medical necessity form filled out. Asked pharmacy to fax it over. Patient notified.Form faxed to [facility].
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
## 2022-02-16 NOTE — NURSING NOTE
Comprehensive Intake Entered On:  12/28/2020 9:35 AM CST    Performed On:  12/28/2020 9:31 AM CST by Freda Sanders CMA               Summary   Chief Complaint :   Med check-verbal consent given for video visit   Marilyn Freda YOUNG - 12/28/2020 9:31 AM CST   Health Status   Allergies Verified? :   Yes   Medication History Verified? :   Yes   Immunizations Current :   Yes   Pre-Visit Planning Status :   Completed   Marilyn Freda YOUNG - 12/28/2020 9:31 AM CST   Consents   Consent for Immunization Exchange :   Consent Granted   Consent for Immunizations to Providers :   Consent Granted   Marilyn Freda YOUNG - 12/28/2020 9:31 AM CST   Meds / Allergies   (As Of: 12/28/2020 9:35:01 AM CST)   Allergies (Active)   No Known Medication Allergies  Estimated Onset Date:   Unspecified ; Created By:   Toña Person CMA; Reaction Status:   Active ; Category:   Drug ; Substance:   No Known Medication Allergies ; Type:   Allergy ; Updated By:   Toña Person CMA; Reviewed Date:   12/3/2019 10:30 AM CST        Medication List   (As Of: 12/28/2020 9:35:01 AM CST)   Prescription/Discharge Order    dulaglutide  :   dulaglutide ; Status:   Prescribed ; Ordered As Mnemonic:   Trulicity Pen 1.5 mg/0.5 mL subcutaneous solution ; Simple Display Line:   See Instructions, INJECT 1.5 MG EVERY WEEK, 2 mL, 2 Refill(s) ; Ordering Provider:   Austen Mcdaniels MD; Catalog Code:   dulaglutide ; Order Dt/Tm:   9/29/2020 10:03:19 AM CDT          insulin glargine  :   insulin glargine ; Status:   Prescribed ; Ordered As Mnemonic:   Basaglar KwikPen 100 units/mL subcutaneous solution ; Simple Display Line:   20 unit(s), Subcutaneous, hs, 15 mL, 3 Refill(s) ; Ordering Provider:   Austen Mcdaniels MD; Catalog Code:   insulin glargine ; Order Dt/Tm:   12/3/2019 10:17:14 AM CST          metFORMIN  :   metFORMIN ; Status:   Prescribed ; Ordered As Mnemonic:   metFORMIN 1000 mg oral tablet ; Simple Display Line:   1,000 mg, 1  tab(s), Oral, bid, 180 tab(s), 3 Refill(s) ; Ordering Provider:   Austen Mcdaniels MD; Catalog Code:   metFORMIN ; Order Dt/Tm:   6/24/2020 9:21:22 AM CDT          metoprolol  :   metoprolol ; Status:   Prescribed ; Ordered As Mnemonic:   Metoprolol Tartrate 25 mg oral tablet ; Simple Display Line:   1 tab(s), Oral, bid, 180 tab(s), 1 Refill(s) ; Ordering Provider:   Austen Mcdaniels MD; Catalog Code:   metoprolol ; Order Dt/Tm:   7/13/2020 1:21:01 PM CDT          Miscellaneous Prescription  :   Miscellaneous Prescription ; Status:   Prescribed ; Ordered As Mnemonic:   diabetic test strips ; Simple Display Line:   See Instructions, test bid, 1 bottle(s) ; Ordering Provider:   Austen Mcdaniels MD; Catalog Code:   Miscellaneous Prescription ; Order Dt/Tm:   4/8/2015 2:43:52 PM CDT          Miscellaneous Rx Supply  :   Miscellaneous Rx Supply ; Status:   Prescribed ; Ordered As Mnemonic:   B-D Pen Needle Tiffanie [id number](5/32) ; Simple Display Line:   See Instructions, Use with Basaglar once daily, 100 EA, 3 Refill(s) ; Ordering Provider:   Austen Mcdaniels MD; Catalog Code:   Miscellaneous Rx Supply ; Order Dt/Tm:   12/30/2019 6:21:34 PM CST          Miscellaneous Rx Supply  :   Miscellaneous Rx Supply ; Status:   Prescribed ; Ordered As Mnemonic:   Freestyle Kaycee monitor ; Simple Display Line:   See Instructions, Use as needed for glucose monitoring, 14 day monitor, 2 EA, 11 Refill(s) ; Ordering Provider:   Austen Mcdaniels MD; Catalog Code:   Miscellaneous Rx Supply ; Order Dt/Tm:   3/27/2019 5:02:37 PM CDT          Miscellaneous Rx Supply  :   Miscellaneous Rx Supply ; Status:   Prescribed ; Ordered As Mnemonic:   Freestyle Kaycee patches ; Simple Display Line:   See Instructions, apply every 14 days, 3 EA, 6 Refill(s) ; Ordering Provider:   Austen Mcdaniels MD; Catalog Code:   Miscellaneous Rx Supply ; Order Dt/Tm:   3/20/2019 9:35:37 AM CDT          simvastatin  :   simvastatin ; Status:   Prescribed ; Ordered  "As Mnemonic:   simvastatin 80 mg oral tablet ; Simple Display Line:   1 tab(s), Oral, qhs, 90 tab(s), 1 Refill(s) ; Ordering Provider:   Austen Mcdaniels MD; Catalog Code:   simvastatin ; Order Dt/Tm:   7/13/2020 1:21:20 PM CDT            Home Meds    aspirin  :   aspirin ; Status:   Documented ; Ordered As Mnemonic:   aspirin ; Simple Display Line:   81 mg, po, daily ; Catalog Code:   aspirin ; Order Dt/Tm:   9/20/2014 9:41:12 AM CDT            ID Risk Screen   Recent Travel History :   No recent travel   Family Member Travel History :   No recent travel   Other Exposure to Infectious Disease :   Unknown   Freda Sanders CMA - 12/28/2020 9:31 AM CST   Social History   Social History   (As Of: 12/28/2020 9:35:01 AM CST)   Alcohol:  Current      Current, Wine (5 oz), 3-5 times per week   (Last Updated: 7/3/2013 4:03:48 PM CDT by Toña Person CMA)          Tobacco:  Past      Former smoker, quit more than 30 days ago   (Last Updated: 12/28/2020 9:34:55 AM CST by Freda Sanders CMA)          Electronic Cigarette/Vaping:        Electronic Cigarette Use: Never.   (Last Updated: 12/28/2020 9:34:58 AM CST by Freda Sanders CMA)          Home/Environment:        Marital status: .  Spouse/Partner name: Sarah Pa.   (Last Updated: 7/3/2013 4:03:38 PM CDT by Toña Person CMA)          Nutrition/Health:        Type of diet: Diabetic.   (Last Updated: 7/3/2013 4:04:04 PM CDT by Toña Person CMA)          Exercise:  Regular exercise      Exercise frequency: 3-4 times/week.  Exercise type: Bicycling, Walking.   (Last Updated: 12/12/2012 5:06:34 PM CST by Sofia Aguirre CMA)          Sexual:        Sexually active: Yes.  Sexual orientation: Heterosexual.  Other contraceptive use: \"age\".   (Last Updated: 12/12/2012 5:05:58 PM CST by Sofia Aguirre CMA)  "

## 2022-02-16 NOTE — TELEPHONE ENCOUNTER
Entered by Nory Orellana CMA on January 20, 2021 8:23:54 AM CST  ---------------------  From: Nory Orellana CMA   To: [facility] #[id number]    Sent: 1/20/2021 8:23:54 AM CST  Subject: Medication Management     ** Submitted: **  Order:insulin glargine (Basaglar KwikPen 100 units/mL subcutaneous solution)  See Instructions  INJECT 20 UNITS UNDER SKIN AT BEDTIME  Qty:  15 mL        Days Supply:  75        Refills:  1          Substitutions Allowed     Route To [facility] - [facility] #[id number]    Signed by Nory Orellana CMA  1/20/2021 2:22:00 PM [facility]    ** Submitted: **  Complete:insulin glargine (Basaglar KwikPen 100 units/mL subcutaneous solution)   Signed by Nory Orellana CMA  1/20/2021 2:23:00 PM [facility]    ** Not Approved:  **  insulin glargine (BASAGLAR 100 U/ML KWIKPEN INJ 3ML)  INJECT 20 UNITS UNDER SKIN AT BEDTIME  Qty:  15 mL        Days Supply:  75        Refills:  0          Substitutions Allowed     Route To [employer] [facility] #[id number]   Signed by Nory Orellana CMA            ------------------------------------------  From: [facility] #[id number]  To: Austen Mcdaniels MD  Sent: January 18, 2021 4:26:11 PM CST  Subject: Medication Management  Due: January 15, 2021 8:19:52 PM CST     ** On Hold Pending Signature **     Dispensed Drug: insulin glargine (Basaglar KwikPen 100 units/mL subcutaneous solution), INJECT 20 UNITS UNDER SKIN AT BEDTIME  Quantity: 15 mL  Days Supply: 75  Refills: 0  Substitutions Allowed  Notes from Pharmacy:  ------------------------------------------LOV: 12/28/2020 DM Visit  LRF: 12/03/2019  RTC placed for 6 month-due in June    Ok to refill through June as pt is up to date per protocol.  Nory Orellana CMA

## 2022-02-16 NOTE — TELEPHONE ENCOUNTER
---------------------  From: Austen Mcdaniels MD   To: LENNOX SAM    Sent: 12/17/2020 9:58:08 AM CST  Subject: General Message       Here are your most recent results.   The A1c is similiar to past and OK (barely).   The cholesterol results are very good.    Results:  Date Result Name Ind Value Ref Range   12/16/2020 9:22 AM Hgb A1c ((H)) 7.9 ( - <5.7)   12/16/2020 9:22 AM Cholesterol  151 mg/dL ( - <200)   12/16/2020 9:22 AM Non-HDL Cholesterol  103 ( - <130)   12/16/2020 9:22 AM HDL  48 mg/dL (> OR = 40 - )   12/16/2020 9:22 AM Cholesterol/HDL Ratio  3.1 ( - <5.0)   12/16/2020 9:22 AM LDL  76    12/16/2020 9:22 AM Triglyceride ((H)) 171 mg/dL ( - <150)   12/16/2020 9:22 AM U Creatinine  185 mg/dL (20 - 320)   12/16/2020 9:22 AM U Microalbumin  0.7 mg/dL (See Note: - )   12/16/2020 9:22 AM Ur Microalbumin/Creatinine Ratio  4 ( - <30)

## 2022-02-16 NOTE — NURSING NOTE
PA filled out and submitted for pt's Freestyle Kaycee Sensor kit. The PA was denied due to it not being a covered service of Medicare Part D.    [employer] CMA

## 2022-02-16 NOTE — LETTER
(Inserted Image. Unable to display)     December 04, 2020      PETER DAHM  [address]   [place], [place] [id number]          Dear LENNOX,      Thank you for selecting [facility] (previously [facility]) for your healthcare needs.    Our records indicate you are due for the following services:    Medicare Annual Wellness Visit.   Urine Labs ~ Please be prepared to leave a urine specimen for evaluation.  Fasting Lab Tests ~ Please do not eat or drink anything 10 hours prior to your scheduled appointment time.  (Water and any medications that you may need are allowed unless directed otherwise.)    If you had your labs done at another facility or with Direct Access Lab Testing at [facility], please bring in a copy of the results to your next visit, mail a copy, or drop off a copy of your results to your Healthcare Provider.    (FYI   Regarding office visits: In some instances, a video visit or telephone visit may be offered as an option.)    You are due for lab work and an office visit; please schedule the lab appointment 1 week before the office visit.  This will assure all results are available to discuss with your Healthcare Provider during your visit.    **It is very helpful if you bring your medication bottles to your appointment.  This assures we have all of your current medications, including strength and dosing information, documented accurately in your medical record.    To schedule an appointment or if you have further questions, please contact your clinic at [phone].      Powered by [employer]    Sincerely,    Austen Mcdaniels MD

## 2022-02-16 NOTE — NURSING NOTE
Comprehensive Intake Entered On:  2021 11:01 AM CDT    Performed On:  2021 10:51 AM CDT by Evelin Cuevas CMA               Summary   Chief Complaint :   DM f/u. Needs new meter, strips and lancets to [facility]. Discuss how often to test sugars.    Weight Measured :   234 lb(Converted to: 234 lb 0 oz, 106.141 kg)    Systolic Blood Pressure :   134 mmHg (HI)    Diastolic Blood Pressure :   84 mmHg (HI)    Mean Arterial Pressure :   101 mmHg   Peripheral Pulse Rate :   56 bpm (LOW)    BP Site :   Right arm   Pulse Site :   Radial artery   BP Method :   Manual   HR Method :   Manual   Temperature Tympanic :   97.4 DegF(Converted to: 36.3 DegC)  (LOW)    Evelin Cuevas CMA - 2021 10:51 AM CDT   Health Status   Allergies Verified? :   Yes   Medication History Verified? :   Yes   Immunizations Current :   Yes   Pre-Visit Planning Status :   Not completed   Tobacco Use? :   Never smoker   Evelin Cuevas CMA - 2021 10:51 AM CDT   Demographics   Last Name :   Sevenvinny   Address :   [address] [place] [address]    First Name :   Constantin   Aron Initial :   FACUNDO   Responsible Party Date of Birth () :   1950 CST   City :   [place]   State :   [place]   Zip Code :   [id number]   Contact Relationship to Patient (other) :   Patient   [place] Evelin YOUNG - 2021 10:51 AM CDT   Contact Information Grid   Name :    No other providers                Evelin Cuevas CMA - 2021 10:51 AM CDT         Consents   Consent for Immunization Exchange :   Consent Granted   Consent for Immunizations to Providers :   Consent Granted   Evelin Cuevas CMA - 2021 10:51 AM CDT   Meds / Allergies   (As Of: 2021 11:01:41 AM CDT)   Allergies (Active)   No Known Medication Allergies  Estimated Onset Date:   Unspecified ; Created By:   Toña Person CMA; Reaction Status:   Active ; Category:   Drug ; Substance:   No Known Medication Allergies ; Type:   Allergy ; Updated By:   Toña Person CMA; Reviewed Date:   2021 12:55 PM CDT         Medication List   (As Of: 8/2/2021 11:01:41 AM CDT)   Prescription/Discharge Order    cyclobenzaprine  :   cyclobenzaprine ; Status:   Prescribed ; Ordered As Mnemonic:   cyclobenzaprine 10 mg oral tablet ; Simple Display Line:   10 mg, 1 tab(s), Oral, tid, PRN: for spasm, 30 tab(s), 0 Refill(s) ; Ordering Provider:   Carlito Teague MD; Catalog Code:   cyclobenzaprine ; Order Dt/Tm:   7/21/2021 12:16:56 PM CDT          dulaglutide  :   dulaglutide ; Status:   Prescribed ; Ordered As Mnemonic:   Trulicity Pen 1.5 mg/0.5 mL subcutaneous solution ; Simple Display Line:   1.5 mg, Subcutaneous, qweek, 6 mL, 0 Refill(s) ; Ordering Provider:   Austen Mcdaniels MD; Catalog Code:   dulaglutide ; Order Dt/Tm:   4/5/2021 1:19:50 PM CDT          insulin glargine  :   insulin glargine ; Status:   Prescribed ; Ordered As Mnemonic:   Basaglar KwikPen 100 units/mL subcutaneous solution ; Simple Display Line:   See Instructions, ADMINISTER 20 UNITS UNDER THE SKIN AT BEDTIME, 15 mL, 1 Refill(s) ; Ordering Provider:   Austen Mcdaniels MD; Catalog Code:   insulin glargine ; Order Dt/Tm:   6/2/2021 10:28:43 AM CDT          metFORMIN  :   metFORMIN ; Status:   Prescribed ; Ordered As Mnemonic:   metFORMIN 1000 mg oral tablet ; Simple Display Line:   1,000 mg, 1 tab(s), Oral, bid, 180 tab(s), 3 Refill(s) ; Ordering Provider:   Austen Mcdaniels MD; Catalog Code:   metFORMIN ; Order Dt/Tm:   12/28/2020 10:05:58 AM CST          metoprolol  :   metoprolol ; Status:   Prescribed ; Ordered As Mnemonic:   Metoprolol Tartrate 25 mg oral tablet ; Simple Display Line:   1 tab(s), Oral, bid, 180 tab(s), 1 Refill(s) ; Ordering Provider:   Austen Mcdaniels MD; Catalog Code:   metoprolol ; Order Dt/Tm:   12/28/2020 10:05:33 AM CST          Miscellaneous Prescription  :   Miscellaneous Prescription ; Status:   Prescribed ; Ordered As Mnemonic:   diabetic test strips ; Simple Display Line:   See Instructions, test bid, 1 bottle(s) ; Ordering  Provider:   Austen Mcdaniels MD; Catalog Code:   Miscellaneous Prescription ; Order Dt/Tm:   4/8/2015 2:43:52 PM CDT          Miscellaneous Rx Supply  :   Miscellaneous Rx Supply ; Status:   Prescribed ; Ordered As Mnemonic:   B-D Pen Needle Tiffanie [id number](5/32) ; Simple Display Line:   See Instructions, Use with Basaglar once daily, 100 EA, 3 Refill(s) ; Ordering Provider:   Austen Mcdaniels MD; Catalog Code:   Miscellaneous Rx Supply ; Order Dt/Tm:   2/24/2021 3:06:35 PM CST          Miscellaneous Rx Supply  :   Miscellaneous Rx Supply ; Status:   Prescribed ; Ordered As Mnemonic:   Freestyle Kaycee monitor ; Simple Display Line:   See Instructions, Use as needed for glucose monitoring, 14 day monitor, 2 EA, 11 Refill(s) ; Ordering Provider:   Austen Mcdaniels MD; Catalog Code:   Miscellaneous Rx Supply ; Order Dt/Tm:   3/27/2019 5:02:37 PM CDT          Miscellaneous Rx Supply  :   Miscellaneous Rx Supply ; Status:   Prescribed ; Ordered As Mnemonic:   Freestyle Kaycee patches ; Simple Display Line:   See Instructions, apply every 14 days, 3 EA, 6 Refill(s) ; Ordering Provider:   Austen Mcdaniels MD; Catalog Code:   Miscellaneous Rx Supply ; Order Dt/Tm:   3/20/2019 9:35:37 AM CDT          simvastatin  :   simvastatin ; Status:   Prescribed ; Ordered As Mnemonic:   simvastatin 80 mg oral tablet ; Simple Display Line:   1 tab(s), Oral, qhs, 90 tab(s), 1 Refill(s) ; Ordering Provider:   Austen Mcdaniels MD; Catalog Code:   simvastatin ; Order Dt/Tm:   12/28/2020 10:05:50 AM CST            Home Meds    aspirin  :   aspirin ; Status:   Documented ; Ordered As Mnemonic:   aspirin ; Simple Display Line:   81 mg, po, daily ; Catalog Code:   aspirin ; Order Dt/Tm:   9/20/2014 9:41:12 AM CDT

## 2022-02-16 NOTE — NURSING NOTE
Quick Intake Entered On:  12/16/2020 9:41 AM CST    Performed On:  12/16/2020 9:41 AM CST by Ariela Franco RN               Summary   Chief Complaint :   BP check   Systolic Blood Pressure :   127 mmHg   Diastolic Blood Pressure :   81 mmHg (HI)    Mean Arterial Pressure :   96 mmHg   Peripheral Pulse Rate :   61 bpm   BP Site :   Right arm   Pulse Site :   Brachial artery   BP Method :   Electronic   HR Method :   Electronic   Ariela Franco RN - 12/16/2020 9:41 AM CST

## 2022-02-16 NOTE — TELEPHONE ENCOUNTER
Entered by Vianey Zhong LPN on December 29, 2020 10:29:13 AM CST  ---------------------  From: Vianey Zhong LPN   To: [facility] #[id number]    Sent: 12/29/2020 10:29:13 AM CST  Subject: Medication Management     ** Not Approved: Patient has requested refill too soon, filled on 12/28/20 **  dulaglutide (TRULICITY 1.5MG/0.5ML SDP 0.5ML)  ADMINISTER 1.5 MG UNDER THE SKIN EVERY WEEK  Qty:  2 mL        Days Supply:  28        Refills:  0          Substitutions Allowed     Route To Pharmacy - [facility] #[id number]   Signed by Vianey Zhong LPN            ------------------------------------------  From: [facility] #[id number]  To: Austen Mcdaniels MD  Sent: December 29, 2020 3:45:23 AM CST  Subject: Medication Management  Due: December 24, 2020 1:57:35 PM CST     ** On Hold Pending Signature **     Dispensed Drug: dulaglutide (Trulicity Pen 1.5 mg/0.5 mL subcutaneous solution), ADMINISTER 1.5 MG UNDER THE SKIN EVERY WEEK  Quantity: 2 mL  Days Supply: 28  Refills: 0  Substitutions Allowed  Notes from Pharmacy:  ------------------------------------------

## 2022-02-16 NOTE — PROGRESS NOTES
Patient:   LENNOX SAM            MRN: [id number]            FIN: [id number]               Age:   67 years     Sex:  Male     :  1950   Associated Diagnoses:   CAD (Coronary Artery Disease); Diabetes   Author:   Austen Mcdaniels MD      Visit Information      Date of Service: 2018 07:57 am  Performing Location: [facility]  Encounter#: [id number]      Primary Care Provider (PCP):  Austen Mcdaniels MD    NPI# [id number]      Chief Complaint   2018 8:02 AM CST    Discuss blood sugars      History of Present Illness   Patient has diabetes.  He has had improved results after adding Trulicity.  However, he has been traveling and not always following a good diet and he has not been as active as he planned.  Recent labs show an A1C of 8.4.           Review of Systems   Constitutional:  No fever, No chills.    Eye   Ear/Nose/Mouth/Throat:  No nasal congestion, No sore throat.    Respiratory:  No shortness of breath, No cough.    Cardiovascular   Breast   Gastrointestinal:  No nausea, No vomiting, No diarrhea, No constipation.    Genitourinary:  No dysuria.    Gynecologic   Hematology/Lymphatics:  No bruising tendency, No swollen lymph glands.    Endocrine   Immunologic:  No recurrent fevers, No recurrent infections.    Musculoskeletal:  No muscle pain.    Integumentary:  No rash.    Neurologic:  No tingling, No headache.    Psychiatric   All other systems.     Health Status   Allergies:    Allergic Reactions (Selected)  No Known Medication Allergies   Medications:  (Selected)   Prescriptions  Prescribed  Basaglar KwikPen 100 units/mL subcutaneous solution: ( 20 unit(s) ), Subcutaneous, hs, # 15 mL, 3 Refill(s), Type: Maintenance, Pharmacy: [id number], 20 unit(s) Subcutaneous hs  Freestyle Kaycee monitor: Freestyle Kaycee monitor, See Instructions, Instructions: Use as needed for glucose monitoring, Supply, # 1 EA, 0 Refill(s), Type: Maintenance, Pharmacy: [id number], Use  as needed for glucose monitoring  Freestyle Kaycee patches: Freestyle Kaycee patches, See Instructions, Instructions: apply every 10 days, Supply, # 3 EA, 6 Refill(s), Type: Maintenance, Pharmacy: [id number], apply every 10 days  Metoprolol Tartrate 25 mg oral tablet: 1 tab(s) ( 25 mg ), Oral, bid, # 180 tab(s), 1 Refill(s), Type: Soft Stop, Pharmacy: [facility] #[id number], 1 tab(s) Oral bid,x90 day(s)  Norco 5 mg-325 mg oral tablet: 1 tab(s), PO, q4hr, PRN: for pain, # 24 tab(s), 0 Refill(s), Type: Maintenance  Trulicity Pen 1.5 mg/0.5 mL subcutaneous solution: See Instructions, Instructions: INJECT 1.5 MG SUBCUTANEOUSLY EVERY WEEK, # 2 mL, 7 Refill(s), Type: Soft Stop, Pharmacy: [facility] #[id number], INJECT 1.5 MG SUBCUTANEOUSLY EVERY WEEK  diabetic test strips: diabetic test strips, See Instructions, Instructions: test bid, Supply, # 1 bottle(s), 5 Refill(s), Type: Maintenance, Pharmacy: [facility] #[id number], test bid  freestyle kaycee: freestyle kaycee, See Instructions, Instructions: monitor plus patches for continuous glucose monitoring, Supply, # 3 EA, 5 Refill(s), Type: Maintenance, Pharmacy: [id number], monitor plus patches for continuous glucose monitoring  metFORMIN 1000 mg oral tablet: 1 tab(s) ( 1,000 mg ), Oral, bid, # 180 tab(s), 1 Refill(s), Type: Soft Stop, Pharmacy: [facility] #[id number], 1 tab(s) Oral bid,x90 day(s)  simvastatin 80 mg oral tablet: See Instructions, Instructions: TAKE ONE HALF TABLET BY MOUTH NIGHTLY AT BEDTIME, # 45 tab(s), 1 Refill(s), Type: Soft Stop, Pharmacy: [facility] #[id number], TAKE ONE HALF TABLET BY MOUTH NIGHTLY AT BEDTIME  Documented Medications  Documented  aspirin: ( 81 mg ), po, daily, 0 Refill(s), Type: Maintenance   Problem list:    All Problems (Selected)  CAD (Coronary Artery Disease) / 414.00 / Confirmed  Dyslipidemia / 272.4 / Confirmed  Diabetes / 250.00 / Confirmed  Diverticulosis / 562.10 / Confirmed  Nephrolithiasis / 214362391 /  Confirmed  Tinnitus / 388.30 / Confirmed  Obesity / 278.00 / Probable  Use of anticoagulation / 846280064 / Confirmed  Total replacement of right knee joint / 7817255549 / Confirmed  Knee arthropathy / 6787096664 / Confirmed  Long term current use of oral hypoglycemic drug / 027263438 / Confirmed      Histories   Past Medical History:    Active  Diverticulosis (562.10): Onset on 7/1/2009 at 58 years.  Diabetes (250.00): Onset on 11/1/2007 at 56 years.  CAD (Coronary Artery Disease) (414.00): Onset on 12/1/2004 at 53 years.  Dyslipidemia (272.4): Onset on 5/1/2004 at 53 years.  Tinnitus (388.30): Onset on 10/18/2000 at 49 years.  Nephrolithiasis (906388972)  Resolved  Rib fracture ([id number]): Onset on 5/29/2014 at 63 years.  Resolved.  Comments:  8/12/2014 CDT 9:48 AM CDT - Arslan OROZCO, Austen  biTresorit accident in [facility]  Pneumonia (486): Onset in 1959 at 8 years.  Resolved on 7/27/2010 at 59 years.   Family History:    Patient was adopted.    Procedure history:    Colonoscopy (SNOMED CT 361484542) performed by Constantin Valdivia MD on 9/19/2016 at 65 Years.  Comments:  9/27/2016 8:12 AM - Lisette Bradford  Indication: Surveillance.  Sedation:  MAC.  Impression: One 10 mm polyp in cecum; removed with hot snare, resected and retrieved.  One 12 mm polyp at the splenic flexure; removed with hot snare, resected and retrieved.  Recommendation:  Repeat in 1 year.  appendectomy performed by Constantin Valdivia MD on 1/15/2016 at 65 Years.  Comments:  1/27/2016 2:31 PM - Shalonda Dietz RN  Pathology: appendix with fibrous obliteration of lumen and sessile serrated adenoma margin negative  Colonoscopy to be done in 6 months to monitor site per surgeon Constantin Valdivia  Colonoscopy (SNOMED CT 376090115) performed by Robin Cole MD on 12/9/2015 at 65 Years.  Comments:  12/14/2015 3:03 PM - Naina Moreira MA  Indication:   personal hx adenomatous polyp  Sedation:   MAC  Findings:   tubular adenoma x1--size  "5mm; sessile serrated adenoma x1--size 30mm  Recommendation:   f/u w/ general surgeon re: possible right hemicolectomy  Arthroplasty of knee (SNOMED CT 91074418) performed by Juan Luis Montero MD on 9/16/2014 at 63 Years.  Comments:  10/1/2014 6:58 AM - Perry  Mana  Right  Arthroplasty of knee (SNOMED CT 94262627) performed by Juan Luis Montero MD on 12/20/2013 at 63 Years.  Comments:  1/2/2014 7:09 AM - Kimber Amadorri  Left  Stress test ECG - treadmill (SNOMED CT 925663534) on 7/18/2013 at 62 Years.  Comments:  11/26/2013 12:23 PM - Freda Sanders CMA  Colonoscopy (SNOMED CT 373859538) in the month of 7/2009 at 58 Years.  Comments:  7/27/2010 2:35 PM - Toña Person  normal.  recheck 2014  Cardiovascular stress test using the dobutamine stress test protocol (SNOMED CT 5496300583) on 2/15/2007 at 56 Years.  Comments:  7/27/2010 2:51 PM - Toña Person  No evidence of significant myocardial ischemia or infarction.  EF 65%  Colonoscopy (SNOMED CT 409724743) on 2/3/2006 at 55 Years.  Comments:  7/27/2010 2:34 PM - Toña Person  Adenoma  Flexible sigmoidoscopy (SNOMED CT 45777175) on 12/16/2005 at 55 Years.  Angioplasty (SNOMED CT 9053737) in the month of 12/2004 at 53 Years.  Lithotripsy (SNOMED CT 565699239) in 1996 at 46 Years.  Rt Shoulder \"Class 4\" Separation in 1978 at 28 Years.  Tonsillectomy and adenoidectomy (SNOMED CT 812005855) in 1954 at 4 Years.   Social History:        Alcohol Assessment: Current            Current, Wine (5 oz), 3-5 times per week      Tobacco Assessment: Past            Past      Home and Environment Assessment            Marital status: .  Spouse/Partner name: Sarah Pa.      Nutrition and Health Assessment            Type of diet: Diabetic.      Exercise and Physical Activity Assessment: Regular exercise            Exercise frequency: 3-4 times/week.  Exercise type: Bicycling, Walking.      Sexual Assessment            Sexually active: Yes.  Sexual " "orientation: Heterosexual.  Other contraceptive use: \"age\".        Physical Examination   Vital Signs   11/6/2018 8:02 AM CST Peripheral Pulse Rate 64 bpm    Systolic Blood Pressure 112 mmHg    Diastolic Blood Pressure 64 mmHg    Mean Arterial Pressure 80 mmHg      Measurements from flowsheet : Measurements   11/6/2018 8:02 AM CST Height Measured - Standard 71 in    Weight Measured - Standard 232.4 lb    BSA 2.3 m2    Body Mass Index 32.41 kg/m2  HI      General:  Alert and oriented, No acute distress.    Eye:  Pupils are equal, round and reactive to light, Normal conjunctiva.    HENT:  Oral mucosa is moist.    Neck:  Supple.    Respiratory:  Lungs are clear to auscultation, Respirations are non-labored.    Cardiovascular:  Normal rate, Regular rhythm, No edema.    Gastrointestinal:  Non-distended.    Musculoskeletal:  Normal gait.    Integumentary:  Warm, No rash.    Psychiatric:  Cooperative, Appropriate mood & affect, Normal judgment.       Review / Management   Results review:  Lab results   10/30/2018 8:30 AM CDT Sodium Level 137 mmol/L    Potassium Level 4.5 mmol/L    Chloride Level 102 mmol/L    CO2 Level 27 mmol/L    Glucose Level 233 mg/dL  HI    BUN 25 mg/dL    Creatinine 1.03 mg/dL    BUN/Creat Ratio NOT APPLICABLE    eGFR 74 mL/min/1.73m2    eGFR African American 86 mL/min/1.73m2    Calcium Level 10.2 mg/dL    Hgb A1c 8.4  HI    Cholesterol 156 mg/dL    Non-    HDL 46 mg/dL    Chol/HDL Ratio 3.4    LDL 87    Triglyceride 132 mg/dL    PSA 1.2 ng/mL    U Microalbumin 1.0 mg/dL    Ur Creatinine 183 mg/dL    Ur Microalb/Creat Ratio 5    WBC 6.7    RBC 4.73    Hgb 14.8 gm/dL    Hct 42.7 %    MCV 90.3 fL    MCH 31.3 pg    MCHC 34.7 gm/dL    RDW 12.6 %    Platelet 156    MPV 10.8 fL   .       Impression and Plan   We went over options.  He is going to start Basaglar, a nighttime basal insulin at 20 Units although he will be careful to check his sugars more frequently.  We discussed using a glucose " monitoring system at least for a short time.  We will recheck in 3 months and see how he is doing.        Diagnosis     CAD (Coronary Artery Disease) (AIO85-MY I25.10).     Diabetes (HAF36-ZO E11.9).

## 2022-02-16 NOTE — LETTER
(Inserted Image. Unable to display)     July 29, 2019      LENNOX SAM  [address]   [place], [place] [id number]          Dear LENNOX,      Thank you for selecting [facility] (previously [facility] for your healthcare needs.      Our records indicate you are due for the following services:     Non-Fasting Labs.    If you had your labs done at another facility or with Direct Access Lab Testing at [facility], please bring in a copy of the results to your next visit, mail a copy, or drop off a copy of your results to your Healthcare Provider.      To schedule an appointment or if you have further questions, please contact your primary clinic:   [facility]       [phone]   [facility]       [phone]              [facility]     [phone]      Powered by [employer] and [employer]    Sincerely,    Austen Mcdaniels MD

## 2022-02-16 NOTE — PROGRESS NOTES
"   Patient:   LENNOX SAM            MRN: [id number]            FIN: [id number]               Age:   67 years     Sex:  Male     :  1950   Associated Diagnoses:   Diabetes; CAD (Coronary Artery Disease); Dyslipidemia; Left sided chest pain   Author:   Austen Mcdaniels MD      Chief Complaint   4/10/2018 10:01 AM CDT   here for DM check, high BS's      History of Present Illness   See chief complaint as noted above and confirmed with the patient.    67 year old male patient presents for follow up evaluation of diabetes.  The quality of the patients diabetes is described as being changed from previous visit.  Patients diet is described as changed, he has been eating choclate this winter yet weight has remained pretty stable.  Patients activity level is described as decreased, over this past winter he has felt sluggish and has not been moving around and excercising much.      In the past week has had some left sided chest pain's, notices the pain comes when he is thinking hard about things, he thinks he may be forgetting to breath while he is thinking so hard.     BG Testing / Frequency: once every two to three weeks  Treatment/Medications (insulin or non-insulin / oral): Currently taking Metformin 1000mg one tab twice daily and Trulicity 1.5mg every week  Hypoglycemic episodes: None  Compliance problems: Should be checking blood sugars more often.   Last HgbA1c date and result: 18 at \"8.5\"  Last eye exam: UTD, current glasses      Feet: Denies any sores on his feet, did see podiatry  15 does have hammertoes, has pain in the bottom of his feet, told by podiatrist that he has neruopathy in his feet, he has been applying a kenny on there and finds this does help with the pain along with massage.       Review of Systems   Constitutional:  Decreased activity, No fever.    Eye:  No recent visual problem.    Respiratory:  No shortness of breath, No cough, No sputum production.    Cardiovascular:  No palpitations, No " tachycardia, No syncope     Chest pain: Left sided, Midsternal, notcies when he is thinking hard about things believes he forgets to breath. .    Gastrointestinal:  No nausea, No vomiting, No diarrhea, No abdominal pain.    Hematology/Lymphatics:  No swollen lymph glands.    Endocrine:  No excessive thirst, No polyuria, No hyperglycemia, No hypoglycemia.    Integumentary:  No rash.    Neurologic:  Alert and oriented X4, No numbness, No headache.              Health Status   Allergies:    Allergic Reactions (Selected)  No known allergies   Medications:  (Selected)   Prescriptions  Prescribed  Metoprolol Tartrate 25 mg oral tablet: 1 tab(s) ( 25 mg ), po, bid, # 30 tab(s), 0 Refill(s), Type: Maintenance, Pharmacy: [facility] #[id number], 2 week protocol. Pt notified to make appt now  Norco 5 mg-325 mg oral tablet: 1 tab(s), PO, q4hr, PRN: for pain, # 24 tab(s), 0 Refill(s), Type: Maintenance  Trulicity Pen 1.5 mg/0.5 mL subcutaneous solution: See Instructions, Instructions: INJECT 1.5 MG SUBCUTANEOUSLY EVERY WEEK, # 2 mL, 2 Refill(s), Type: Maintenance, Pharmacy: [facility] #[id number]  diabetic test strips: diabetic test strips, See Instructions, Instructions: test bid, Supply, # 1 bottle(s), 5 Refill(s), Type: Maintenance, Pharmacy: [facility] #[id number], test bid  metFORMIN 1000 mg oral tablet: 1 tab(s) ( 1,000 mg ), po, bid, # 30 tab(s), 0 Refill(s), Type: Maintenance, Pharmacy: [facility] #[id number], 2 week protocol. Pt notified to make appt now  simvastatin 80 mg oral tablet: See Instructions, Instructions: TAKE ONE HALF TABLET BY MOUTH NIGHTLY AT BEDTIME, # 7 tab(s), 0 Refill(s), Type: Maintenance, Pharmacy: [facility] #[id number], 2 week protocol. Pt notified to make appt now  Documented Medications  Documented  aspirin: ( 81 mg ), po, daily, 0 Refill(s), Type: Maintenance   Problem list:    All Problems  Use of anticoagulation / SNOMED CT 397794208 / Confirmed  Total replacement of right knee joint / SNOMED CT  8898454063 / Confirmed  Tinnitus / ICD-9-.30 / Confirmed  Obesity / ICD-9-.00 / Probable  Nephrolithiasis / SNOMED CT 648867150 / Confirmed  Dyslipidemia / ICD-9-.4 / Confirmed  Diverticulosis / ICD-9-.10 / Confirmed  Diabetes / ICD-9-.00 / Confirmed  CAD (Coronary Artery Disease) / ICD-9-.00 / Confirmed  Knee arthropathy / SNOMED CT 9916122802 / Confirmed  Inactive: MI (Myocardial Infarction) / ICD-9-.90  Inactive: Glucose Intolerance / ICD-9-.3  Inactive: Adenoma of Small Intestine / ICD-9-.2  Resolved: Rib fracture / SNOMED CT [id number]  Resolved: Pneumonia / ICD-9-      Histories   Past Medical History:    Active  Diverticulosis (562.10): Onset on 7/1/2009 at 58 years.  Diabetes (250.00): Onset on 11/1/2007 at 56 years.  CAD (Coronary Artery Disease) (414.00): Onset on 12/1/2004 at 53 years.  Dyslipidemia (272.4): Onset on 5/1/2004 at 53 years.  Tinnitus (388.30): Onset on 10/18/2000 at 49 years.  Nephrolithiasis (929638062)  Resolved  Rib fracture ([id number]): Onset on 5/29/2014 at 63 years.  Resolved.  Comments:  8/12/2014 CDT 9:48 AM CDT - Arslan OROZCO, Austen  biXiimo accident in [facility]  Pneumonia (486): Onset in 1959 at 8 years.  Resolved on 7/27/2010 at 59 years.   Family History:    Patient was adopted.    Procedure history:    Colonoscopy (113422822) on 9/19/2016 at 65 Years.  Comments:  9/27/2016 8:12 AM - Lisette Bradford  Indication: Surveillance.  Sedation:  MAC.  Impression: One 10 mm polyp in cecum; removed with hot snare, resected and retrieved.  One 12 mm polyp at the splenic flexure; removed with hot snare, resected and retrieved.  Recommendation:  Repeat in 1 year.  appendectomy on 1/15/2016 at 65 Years.  Comments:  1/27/2016 2:31 PM - Shalonda Dietz RN  Pathology: appendix with fibrous obliteration of lumen and sessile serrated adenoma margin negative  Colonoscopy to be done in 6  "months to monitor site per surgeon Constantin Valdivia  Colonoscopy (953832831) on 12/9/2015 at 65 Years.  Comments:  12/14/2015 3:03 PM - Harish MICHAELS, Naina  Indication:   personal hx adenomatous polyp  Sedation:   MAC  Findings:   tubular adenoma x1--size 5mm; sessile serrated adenoma x1--size 30mm  Recommendation:   f/u w/ general surgeon re: possible right hemicolectomy  Arthroplasty of knee (10207572) on 9/16/2014 at 63 Years.  Comments:  10/1/2014 6:58 AM - Mana Amador  Right  Arthroplasty of knee (99435579) on 12/20/2013 at 63 Years.  Comments:  1/2/2014 7:09 AM - Mana Amador  Left  Stress test ECG - treadmill (401431622) on 7/18/2013 at 62 Years.  Comments:  11/26/2013 12:23 PM - Freda Sanders CMA  Colonoscopy (201983813) in the month of 7/2009 at 58 Years.  Comments:  7/27/2010 2:35 PM - Toña Person  normal.  recheck 2014  Cardiovascular stress test using the dobutamine stress test protocol (2827851056) on 2/15/2007 at 56 Years.  Comments:  7/27/2010 2:51 PM - Toña Person  No evidence of significant myocardial ischemia or infarction.  EF 65%  Colonoscopy (197944969) on 2/3/2006 at 55 Years.  Comments:  7/27/2010 2:34 PM - Toña Person  Adenoma  Flexible sigmoidoscopy (35767460) on 12/16/2005 at 55 Years.  Angioplasty (8134858) in the month of 12/2004 at 53 Years.  Lithotripsy (075729976) in 1996 at 46 Years.  Rt Shoulder \"Class 4\" Separation in 1978 at 28 Years.  Tonsillectomy and adenoidectomy (226068446) in 1954 at 4 Years.   Social History:        Alcohol Assessment: Current            Current, Wine (5 oz), 3-5 times per week      Tobacco Assessment: Past            Past      Home and Environment Assessment            Marital status: .  Spouse/Partner name: Sarah Pa.      Nutrition and Health Assessment            Type of diet: Diabetic.      Exercise and Physical Activity Assessment: Regular exercise            Exercise frequency: 3-4 times/week.  Exercise " "type: Bicycling, Walking.      Sexual Assessment            Sexually active: Yes.  Sexual orientation: Heterosexual.  Other contraceptive use: \"age\".        Physical Examination   Vital Signs   4/10/2018 10:01 AM CDT Peripheral Pulse Rate 66 bpm    Pulse Site Radial artery    Systolic Blood Pressure 138 mmHg  HI    Diastolic Blood Pressure 80 mmHg    Mean Arterial Pressure 99 mmHg    BP Site Right arm    Oxygen Saturation 97 %      Measurements from flowsheet : Measurements   4/10/2018 10:01 AM CDT Height Measured - Standard 71 in    Weight Measured - Standard 240.6 lb    BSA 2.34 m2    Body Mass Index 33.55 kg/m2  HI      General:  Alert and oriented, No acute distress.    Eye:  Pupils are equal, round and reactive to light, Normal conjunctiva.    HENT:  Normocephalic, Oral mucosa is moist, No pharyngeal erythema.    Neck:  Supple, Non-tender, No lymphadenopathy.    Respiratory:  Lungs are clear to auscultation, Respirations are non-labored.    Cardiovascular:  Normal rate, Regular rhythm, Normal peripheral perfusion, No edema.    Gastrointestinal:  Soft, Non-tender, Non-distended, No organomegaly.    Musculoskeletal:  Normal range of motion, Normal strength, No swelling, Normal gait.    Integumentary:  Warm, No rash.    Feet:  Normal by visual exam, Normal pulses.         Sensory status: Bilateral, Within normal limits, By monofilament exam.    Neurologic:  Alert, Oriented.    Psychiatric:  Cooperative, Appropriate mood & affect, Normal judgment.       Review / Management   Results review:  Lab results: 4/4/2018 10:24 AM CDT    Hgb A1c                   8.5  HI.       Impression and Plan   Diagnosis     Diabetes (YEY74-IE E11.9).     CAD (Coronary Artery Disease) (XWK44-NR I25.10).     Dyslipidemia (STZ28-MJ E78.5).     Left sided chest pain (TIO39-DI R07.9).     Course:  Worsening.    Plan:  Diabetes is becoming more uncontrolled.     Previous lab results reviewed.      Medications, diet, and activity discussed.  "     Follow up in 3 months for visit and labs according to chronic disease guidelines.   .    Summary:  Discussed his recent HgA1C, it has risen and he has only been checking blood sugars once or twice every two to three weeks. This past winter he has admitted to being sluggish and eating sweets.     Discussed adding insulin to help better control his sugars, at this time he would like to stick with current medications and will begin excercising and eating better, he would like to return in three months to recheck his HgA1C and if it has gone down he will continue to excercise and eat better, if it has risen or stay's the same he would then consider starting insulin.     , Discussed chest pains, he notices them when he is greatly focused on certain subjects, he feels he is just forgetting to breath and it causes his chest to hurt. He did a stress test in 7- showing an EF of 62%, normal stress test at that time. Discussed him repeating a stress test at this time due to the chest pains, he agrees and will get this scheduled in the near future. .    e does want to restart physical activity to control his weight and diabetes.

## 2022-02-16 NOTE — NURSING NOTE
Quick Intake Entered On:  6/17/2020 9:20 AM CDT    Performed On:  6/17/2020 9:20 AM CDT by Shalonda Dietz RN               Summary   Weight Measured :   239.4 lb(Converted to: 239 lb 6 oz, 108.59 kg)    Systolic Blood Pressure :   130 mmHg   Diastolic Blood Pressure :   80 mmHg   Mean Arterial Pressure :   97 mmHg   Temperature Tympanic :   98.2 DegF(Converted to: 36.8 DegC)    Shalonda Dietz RN - 6/17/2020 9:20 AM CDT

## 2022-02-16 NOTE — TELEPHONE ENCOUNTER
Entered by Naina Moreira MA on July 13, 2020 1:21:33 PM CDT  ---------------------  From: Naina Moreira MA   [id number]    Sent: 7/13/2020 1:21:33 PM CDT  Subject: Medication Management     ** Submitted: **  Order:simvastatin (simvastatin 80 mg oral tablet)  1 tab(s)  Oral  qhs  Qty:  90 tab(s)        Days Supply:  90        Refills:  1          Substitutions Allowed     Route To [facility] #[id number]    Signed by Naina Moreira MA  7/13/2020 6:21:00 PM UT    ** Submitted: **  Complete:simvastatin (simvastatin 80 mg oral tablet)   Signed by Naina Moreira MA  7/13/2020 6:21:00 PM UT    ** Not Approved:  **  simvastatin (SIMVASTATIN 80MG TABLETS)  TAKE 1 TABLET BY MOUTH EVERY NIGHT AT BEDTIME  Qty:  90 tab(s)        Days Supply:  90        Refills:  0          Substitutions Allowed     [id number]   Note from Pharmacy:  **Patient requests 90 days supply**  Signed by Naina Moreira MA            ** Submitted: **  Order:metoprolol (Metoprolol Tartrate 25 mg oral tablet)  1 tab(s)  Oral  bid  Qty:  180 tab(s)        Days Supply:  90        Refills:  1          Substitutions Allowed     [id number]    Signed by Naina Moreira MA  7/13/2020 6:21:00 PM UT    ** Submitted: **  Complete:metoprolol (Metoprolol Tartrate 25 mg oral tablet)   Signed by Naina Moreira MA  7/13/2020 6:21:00 PM UT    ** Not Approved:  **  metoprolol (METOPROLOL TARTRATE 25MG TABLETS)  TAKE 1 TABLET BY MOUTH TWICE DAILY  Qty:  180 tab(s)        Days Supply:  90        Refills:  0          Substitutions Allowed     [id number]   Note from Pharmacy:  **Patient requests 90 days supply**  Signed by Naina Moreira MA            ------------------------------------------  From: [facility] #[id number]  To: Austen Mcdaniels MD  Sent: July 12, 2020 6:13:38 AM CDT  Subject: Medication  Management  Due: June 24, 2020 10:20:04 PM CDT     ** On Hold Pending Signature **     Dispensed Drug: simvastatin (simvastatin 80 mg oral tablet), TAKE 1 TABLET BY MOUTH EVERY NIGHT AT BEDTIME  Quantity: 90 tab(s)  Days Supply: 90  Refills: 0  Substitutions Allowed  Notes from Pharmacy: **Patient requests 90 days supply**     ** On Hold Pending Signature **     Dispensed Drug: metoprolol (Metoprolol Tartrate 25 mg oral tablet), TAKE 1 TABLET BY MOUTH TWICE DAILY  Quantity: 180 tab(s)  Days Supply: 90  Refills: 0  Substitutions Allowed  Notes from Pharmacy: **Patient requests 90 days supply**  ------------------------------------------LV:  6/24/2020 osvaldo/ BERNARDO for DM f/u; RTC due Dec 2020.

## 2022-02-16 NOTE — LETTER
(Inserted Image. Unable to display)   [address] [place] [address]  March 26, 2021      PETER DAHM  [address] [place] [address]        Dear LENNOX,      Thank you for selecting [facility] for your healthcare needs.     You had a colonoscopy done for colon polyp surveillance on Tuesday March 23rd, 2021. Several precancerous polyps (tubular adenomas) were removed. I would recommend a follow up colonoscopy in 3 years and sooner if new symptoms develop.            Please contact me or my assistant at [phone] if you have any questions or concerns.     Sincerely,        Yasir Cole MD

## 2022-02-16 NOTE — NURSING NOTE
Called and spoke with pt at 10:18am and informed him that [facility] doesn't carry Freestyle wade but [facility] in [place] does.He is ok with us sending it there.

## 2022-02-16 NOTE — TELEPHONE ENCOUNTER
---------------------  From: Robin Cole MD   To: LENNOX SAM    Sent: 3/26/2021 12:25:18 PM CDT  Subject: Colonoscopy     Dear Mr Sam      You had a colonoscopy done for colon polyp surveillance on Tuesday March 23rd, 2021. Several precancerous polyps (tubular adenomas) were removed. I would recommend a follow up colonoscopy in 3 years and sooner if new symptoms develop.    Yasir Cole M.D.
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
## 2022-02-16 NOTE — TELEPHONE ENCOUNTER
Entered by Evelin Cuevas CMA on May 08, 2019 7:17:43 PM CDT  ---------------------  From: Evelin Cuevas CMA   To: [facility] [id number]    Sent: 5/8/2019 7:17:43 PM CDT  Subject: Medication Management     ** Not Approved: Patient has requested refill too soon, filled 3/20/19 #2mL and 7RF **  dulaglutide (TRULICITY 1.5MG/0.5ML SDP 4X0.5ML)  INJECT 1.5MG UNDER THE SKIN EVERY WEEK  Qty:  2 mL        Days Supply:  28        Refills:  0          Substitutions Allowed     Route To Pharmacy - [facility] [id number]   Signed by Evelin Cuevas CMA            ** Patient matched by Evelin Cuevas CMA on 5/8/2019 7:14:51 PM CDT **      ------------------------------------------  From: [facility] [id number]  To: Austen Mcdaniels MD  Sent: May 8, 2019 12:28:22 PM CDT  Subject: Medication Management  Due: May 9, 2019 12:28:22 PM CDT    ** On Hold Pending Signature **  Drug: dulaglutide (Trulicity Pen 1.5 mg/0.5 mL subcutaneous solution)  INJECT 1.5MG UNDER THE SKIN EVERY WEEK  Quantity: 2 mL       Days Supply: 28        Refills: 0  Substitutions Allowed  Notes from Pharmacy:     Dispensed Drug: dulaglutide (Trulicity Pen 1.5 mg/0.5 mL subcutaneous solution)  INJECT 1.5MG UNDER THE SKIN EVERY WEEK  Quantity: 2 mL       Days Supply: 28        Refills: 0  Substitutions Allowed  Notes from Pharmacy:   ------------------------------------------

## 2022-02-16 NOTE — LETTER
(Inserted Image. Unable to display)   June 29, 2021  PETER DAHM  [address] [place] [address]        Dear LENNOX,    Thank you for selecting [facility] for your healthcare needs.    Our records indicate you are due for the following services:     Diabetic Exam ~ Please bring your glucose meter and/or your blood glucose diary to your appointment.  Non-Fasting Labs    If you had your labs done at another facility or with Direct Access Lab Testing at [facility], please bring in a copy of the results to your next visit, mail a copy, or drop off a copy of your results to your Healthcare Provider.     (FYI   Regarding office visits: In some instances, a video visit or telephone visit may be offered as an option.)    To schedule an appointment or if you have further questions, please contact your clinic at [phone].    Powered by [employer]    Sincerely,    Austen Mcdaniels MD

## 2022-02-16 NOTE — TELEPHONE ENCOUNTER
Order, demographics, and notes faxed to Dr. Valdivia per request, patient is aware they will contact him to schedule.
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
## 2022-02-16 NOTE — NURSING NOTE
Diabetes Eye Testing Entered On:  9/19/2020 9:57 AM CDT    Performed On:  9/16/2020 9:57 AM CDT by Yany Priest               Diabetes Eye Testing   Retinopathy Present TR :   Yany Bright - 9/19/2020 9:57 AM CDT

## 2022-02-16 NOTE — TELEPHONE ENCOUNTER
Entered by Nuris Harmon CMA on December 07, 2021 9:22:31 AM CST  ---------------------  From: Nuris Harmon CMA   To: [facility] #[id number]    Sent: 12/7/2021 9:22:31 AM CST  Subject: Medication Management     ** Submitted: **  Order:dulaglutide (Trulicity Pen 3 mg/0.5 mL subcutaneous solution)  3 mg  Subcutaneous  qweek  Qty:  4 EA        Refills:  0          Substitutions Allowed     Route To [facility] - [facility] #[id number]    Signed by Nuris Harmon CMA  12/7/2021 3:21:00 PM [facility]    ** Submitted: **  Complete:dulaglutide (Trulicity Pen 1.5 mg/0.5 mL subcutaneous solution)   Signed by Nuris Harmon CMA  12/7/2021 3:22:00 PM [facility]    ** Not Approved:  **  dulaglutide (TRULICITY 3MG/0.5ML SDP 0.5ML)  ADMINISTER 3 MG UNDER THE SKIN EVERY WEEK  Qty:  2 mL        Days Supply:  28        Refills:  0          Substitutions Allowed     Route To [facility] - [facility] #[id number]   Signed by Nuris Harmon CMA          due for a visit next - RTC placed last appt 8/2/21 Zim  last A1c 7/2021      ------------------------------------------  From: [facility] #[id number]  To: Carlito Teague MD  Sent: December 3, 2021 3:46:39 AM CST  Subject: Medication Management  Due: November 16, 2021 3:34:48 PM CST     ** On Hold Pending Signature **     Dispensed Drug: dulaglutide (Trulicity Pen 3 mg/0.5 mL subcutaneous solution), ADMINISTER 3 MG UNDER THE SKIN EVERY WEEK  Quantity: 2 mL  Days Supply: 28  Refills: 0  Substitutions Allowed  Notes from Pharmacy:  ------------------------------------------
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
## 2022-02-16 NOTE — NURSING NOTE
Comprehensive Intake Entered On:  2021 9:22 AM CST    Performed On:  2021 9:10 AM CST by Evelin Cuevas CMA               Summary   Chief Complaint :   DM/HLD/CAD f/u and refills. Foot exam due.   Weight Measured :   241 lb(Converted to: 241 lb 0 oz, 109.316 kg)    Systolic Blood Pressure :   131 mmHg (HI)    Diastolic Blood Pressure :   83 mmHg (HI)    Mean Arterial Pressure :   99 mmHg   Peripheral Pulse Rate :   71 bpm   BP Site :   Right arm   Pulse Site :   Radial artery   BP Method :   Electronic   HR Method :   Electronic   Temperature Temporal :   97.1 DegF(Converted to: 36.2 DegC)  (LOW)    Evelin Cuevas CMA - 2021 9:10 AM CST   Health Status   Allergies Verified? :   Yes   Medication History Verified? :   Yes   Immunizations Current :   Yes   Pre-Visit Planning Status :   Completed   Evelin Cuevas CMA - 2021 9:10 AM CST   Demographics   Last Name :   [facility]   Address :   [address] [place] [address]    First Name :   Constantin   Aron Initial :   FACUNDO   Responsible Party Date of Birth () :   1950 CST   City :   [place]   State :   [place]   Zip Code :   [id number]   Contact Relationship to Patient (other) :   Patient   Evelin Cuevas CMA - 2021 9:10 AM CST   Contact Information Grid   Name :    No other providers                Evelin Cuevas CMA - 2021 9:10 AM CST         Consents   Consent for Immunization Exchange :   Consent Granted   Consent for Immunizations to Providers :   Consent Granted   Mason HECTORJonyEvelin - 2021 9:10 AM CST   Meds / Allergies   (As Of: 2021 9:22:51 AM CST)   Allergies (Active)   No Known Medication Allergies  Estimated Onset Date:   Unspecified ; Created By:   Toña Person CMA; Reaction Status:   Active ; Category:   Drug ; Substance:   No Known Medication Allergies ; Type:   Allergy ; Updated By:   Toña Person CMA; Reviewed Date:   2021 12:55 PM CDT        Medication List   (As Of: 2021 9:22:51 AM CST)    Prescription/Discharge Order    cyclobenzaprine  :   cyclobenzaprine ; Status:   Prescribed ; Ordered As Mnemonic:   cyclobenzaprine 10 mg oral tablet ; Simple Display Line:   10 mg, 1 tab(s), Oral, tid, PRN: for spasm, 30 tab(s), 0 Refill(s) ; Ordering Provider:   Carlito Teague MD; Catalog Code:   cyclobenzaprine ; Order Dt/Tm:   7/21/2021 12:16:56 PM CDT          dulaglutide  :   dulaglutide ; Status:   Prescribed ; Ordered As Mnemonic:   Trulicity Pen 3 mg/0.5 mL subcutaneous solution ; Simple Display Line:   See Instructions, ADMINISTER ONE PEN UNDER THE SKIN EVERY WEEK AS DIRECTED, 2 mL, 0 Refill(s) ; Ordering Provider:   Austen Mcdaniels MD; Catalog Code:   dulaglutide ; Order Dt/Tm:   12/15/2021 3:05:05 PM CST          insulin glargine  :   insulin glargine ; Status:   Prescribed ; Ordered As Mnemonic:   Basaglar KwikPen 100 units/mL subcutaneous solution ; Simple Display Line:   See Instructions, ADMINISTER 25 UNITS UNDER THE SKIN AT BEDTIME, 15 mL, 3 Refill(s) ; Ordering Provider:   Austen Mcdaniels MD; Catalog Code:   insulin glargine ; Order Dt/Tm:   8/2/2021 11:24:09 AM CDT          metFORMIN  :   metFORMIN ; Status:   Prescribed ; Ordered As Mnemonic:   metFORMIN 1000 mg oral tablet ; Simple Display Line:   1,000 mg, 1 tab(s), Oral, bid, 180 tab(s), 3 Refill(s) ; Ordering Provider:   Austen Mcdaniels MD; Catalog Code:   metFORMIN ; Order Dt/Tm:   8/2/2021 11:26:22 AM CDT          metoprolol  :   metoprolol ; Status:   Prescribed ; Ordered As Mnemonic:   Metoprolol Tartrate 25 mg oral tablet ; Simple Display Line:   1 tab(s), Oral, bid, 180 tab(s), 1 Refill(s) ; Ordering Provider:   Austen Mcdaniels MD; Catalog Code:   metoprolol ; Order Dt/Tm:   8/2/2021 11:26:31 AM CDT          Miscellaneous Prescription  :   Miscellaneous Prescription ; Status:   Prescribed ; Ordered As Mnemonic:   ACCU-CHEK Test STRIPS 100S ; Simple Display Line:   See Instructions, TEST FOUR TIMES DAILY, 400 strip, 1  Refill(s) ; Ordering Provider:   Carlito Teague MD; Catalog Code:   Miscellaneous Prescription ; Order Dt/Tm:   12/29/2021 9:17:54 AM CST          Miscellaneous Rx Supply  :   Miscellaneous Rx Supply ; Status:   Prescribed ; Ordered As Mnemonic:   B-D Pen Needle Tiffanie 56Sb4TI(5/32) ; Simple Display Line:   See Instructions, Use with Basaglar once daily, 100 EA, 3 Refill(s) ; Ordering Provider:   Austen Mcdaniels MD; Catalog Code:   Miscellaneous Rx Supply ; Order Dt/Tm:   2/24/2021 3:06:35 PM CST          simvastatin  :   simvastatin ; Status:   Prescribed ; Ordered As Mnemonic:   simvastatin 80 mg oral tablet ; Simple Display Line:   1 tab(s), Oral, qhs, 90 tab(s), 1 Refill(s) ; Ordering Provider:   Austen Mcdaniels MD; Catalog Code:   simvastatin ; Order Dt/Tm:   8/2/2021 11:26:40 AM CDT            Home Meds    aspirin  :   aspirin ; Status:   Documented ; Ordered As Mnemonic:   aspirin ; Simple Display Line:   81 mg, po, daily ; Catalog Code:   aspirin ; Order Dt/Tm:   9/20/2014 9:41:12 AM CDT            Social History   Social History   (As Of: 12/29/2021 9:22:51 AM CST)   Alcohol:  Current      Current, Wine (5 oz), 3-5 times per week   (Last Updated: 7/3/2013 4:03:48 PM CDT by Toña Person CMA)          Tobacco:  Past      Former smoker, quit more than 30 days ago   (Last Updated: 12/28/2020 9:34:55 AM CST by Freda Sanders CMA)   Former smoker, quit more than 30 days ago   (Last Updated: 12/29/2021 9:11:15 AM CST by Evelin Cuevas CMA)          Electronic Cigarette/Vaping:        Electronic Cigarette Use: Never.   (Last Updated: 12/28/2020 9:34:58 AM CST by Freda Sanders CMA)   Electronic Cigarette Use: Never.   (Last Updated: 12/29/2021 9:11:20 AM CST by Evelin Cuevas CMA)          Home/Environment:        Marital status: .  Spouse/Partner name: Sarah Pa.   (Last Updated: 7/3/2013 4:03:38 PM CDT by Toña Person CMA)          Nutrition/Health:        Type of diet:  "Diabetic.   (Last Updated: 7/3/2013 4:04:04 PM CDT by Toña Person CMA)          Exercise:  Regular exercise      Exercise frequency: 3-4 times/week.  Exercise type: Bicycling, Walking.   (Last Updated: 12/12/2012 5:06:34 PM CST by Sofia Aguirre CMA)          Sexual:        Sexually active: Yes.  Sexual orientation: Heterosexual.  Other contraceptive use: \"age\".   (Last Updated: 12/12/2012 5:05:58 PM CST by Sofia Aguirre CMA)  "

## 2022-02-16 NOTE — TELEPHONE ENCOUNTER
Entered by Chela Machuca LPN on September 01, 2021 9:26:06 AM CDT  ---------------------  From: Chela Machuca LPN   To: [facility] #[id number]    Sent: 9/1/2021 9:26:06 AM CDT  Subject: Medication Management     ** Not Approved: Refill not appropriate, Rx sent 8/4/21 #4 with 3 refills **  dulaglutide (TRULICITY 1.5MG/0.5ML SDP 0.5ML)  ADMINISTER 1.5 MG UNDER THE SKIN EVERY WEEK  Qty:  6 mL        Days Supply:  84        Refills:  0          Substitutions Allowed     Route To Pharmacy - [email or web address] #[id number]   Signed by Chela Machuca LPN            ------------------------------------------  From: [facility] #[id number]  To: Austen Mcdaniels MD  Sent: August 28, 2021 7:44:47 AM CDT  Subject: Medication Management  Due: August 20, 2021 11:17:32 AM CDT     ** On Hold Pending Signature **     Dispensed Drug: dulaglutide (Trulicity Pen 1.5 mg/0.5 mL subcutaneous solution), ADMINISTER 1.5 MG UNDER THE SKIN EVERY WEEK  Quantity: 6 mL  Days Supply: 84  Refills: 0  Substitutions Allowed  Notes from Pharmacy:  ------------------------------------------
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
## 2022-02-16 NOTE — NURSING NOTE
Comprehensive Intake Entered On:  2/18/2021 12:04 PM CST    Performed On:  2/18/2021 11:59 AM CST by Freda Sanders CMA               Summary   Chief Complaint :   pre- op Cataract with Dr. Scruggs at [facility] on 2/24/21   Weight Measured :   240 lb(Converted to: 240 lb 0 oz, 108.862 kg)    Height Measured :   71 in(Converted to: 5 ft 11 in, 180.34 cm)    Body Mass Index :   33.47 kg/m2 (HI)    Body Surface Area :   2.33 m2   Systolic Blood Pressure :   124 mmHg   Diastolic Blood Pressure :   68 mmHg   Mean Arterial Pressure :   87 mmHg   Peripheral Pulse Rate :   72 bpm   Freda Sanders CMA - 2/18/2021 11:59 AM CST   Health Status   Allergies Verified? :   Yes   Medication History Verified? :   Yes   Immunizations Current :   Yes   Pre-Visit Planning Status :   Completed   Tobacco Use? :   Never smoker   Freda Sanders CMA - 2/18/2021 11:59 AM CST   Consents   Consent for Immunization Exchange :   Consent Granted   Consent for Immunizations to Providers :   Consent Granted   Freda Sanders CMA - 2/18/2021 11:59 AM CST   Meds / Allergies   (As Of: 2/18/2021 12:04:45 PM CST)   Allergies (Active)   No Known Medication Allergies  Estimated Onset Date:   Unspecified ; Created By:   Toña Person CMA; Reaction Status:   Active ; Category:   Drug ; Substance:   No Known Medication Allergies ; Type:   Allergy ; Updated By:   Toña Person CMA; Reviewed Date:   12/3/2019 10:30 AM CST        Medication List   (As Of: 2/18/2021 12:04:45 PM CST)   Prescription/Discharge Order    dulaglutide  :   dulaglutide ; Status:   Prescribed ; Ordered As Mnemonic:   Trulicity Pen 1.5 mg/0.5 mL subcutaneous solution ; Simple Display Line:   See Instructions, INJECT 1.5 MG EVERY WEEK, 2 mL, 2 Refill(s) ; Ordering Provider:   Austen Mcdaniels MD; Catalog Code:   dulaglutide ; Order Dt/Tm:   12/28/2020 10:05:22 AM CST          insulin glargine  :   insulin glargine ; Status:   Prescribed ;  Ordered As Mnemonic:   Basaglar KwikPen 100 units/mL subcutaneous solution ; Simple Display Line:   See Instructions, INJECT 20 UNITS UNDER SKIN AT BEDTIME, 15 mL, 1 Refill(s) ; Ordering Provider:   Austen Mcdaniels MD; Catalog Code:   insulin glargine ; Order Dt/Tm:   1/20/2021 8:22:23 AM CST          metFORMIN  :   metFORMIN ; Status:   Prescribed ; Ordered As Mnemonic:   metFORMIN 1000 mg oral tablet ; Simple Display Line:   1,000 mg, 1 tab(s), Oral, bid, 180 tab(s), 3 Refill(s) ; Ordering Provider:   Austen Mcdaniels MD; Catalog Code:   metFORMIN ; Order Dt/Tm:   12/28/2020 10:05:58 AM CST          metoprolol  :   metoprolol ; Status:   Prescribed ; Ordered As Mnemonic:   Metoprolol Tartrate 25 mg oral tablet ; Simple Display Line:   1 tab(s), Oral, bid, 180 tab(s), 1 Refill(s) ; Ordering Provider:   Austen Mcdaniels MD; Catalog Code:   metoprolol ; Order Dt/Tm:   12/28/2020 10:05:33 AM CST          Miscellaneous Prescription  :   Miscellaneous Prescription ; Status:   Prescribed ; Ordered As Mnemonic:   diabetic test strips ; Simple Display Line:   See Instructions, test bid, 1 bottle(s) ; Ordering Provider:   Austen Mcdaniels MD; Catalog Code:   Miscellaneous Prescription ; Order Dt/Tm:   4/8/2015 2:43:52 PM CDT          Miscellaneous Rx Supply  :   Miscellaneous Rx Supply ; Status:   Prescribed ; Ordered As Mnemonic:   B-D Pen Needle Tiffanie [id number](5/32) ; Simple Display Line:   See Instructions, Use with Basaglar once daily, 100 EA, 3 Refill(s) ; Ordering Provider:   Austen Mcdaniels MD; Catalog Code:   Miscellaneous Rx Supply ; Order Dt/Tm:   12/30/2019 6:21:34 PM CST          Miscellaneous Rx Supply  :   Miscellaneous Rx Supply ; Status:   Prescribed ; Ordered As Mnemonic:   Freestyle Kaycee monitor ; Simple Display Line:   See Instructions, Use as needed for glucose monitoring, 14 day monitor, 2 EA, 11 Refill(s) ; Ordering Provider:   Austen Mcdaniels MD; Catalog Code:   Miscellaneous Rx Supply ; Order  Dt/Tm:   3/27/2019 5:02:37 PM CDT          Miscellaneous Rx Supply  :   Miscellaneous Rx Supply ; Status:   Prescribed ; Ordered As Mnemonic:   Freestyle Kaycee patches ; Simple Display Line:   See Instructions, apply every 14 days, 3 EA, 6 Refill(s) ; Ordering Provider:   Austen Mcdaniels MD; Catalog Code:   Miscellaneous Rx Supply ; Order Dt/Tm:   3/20/2019 9:35:37 AM CDT          simvastatin  :   simvastatin ; Status:   Prescribed ; Ordered As Mnemonic:   simvastatin 80 mg oral tablet ; Simple Display Line:   1 tab(s), Oral, qhs, 90 tab(s), 1 Refill(s) ; Ordering Provider:   Austen Mcdaniels MD; Catalog Code:   simvastatin ; Order Dt/Tm:   12/28/2020 10:05:50 AM CST            Home Meds    aspirin  :   aspirin ; Status:   Documented ; Ordered As Mnemonic:   aspirin ; Simple Display Line:   81 mg, po, daily ; Catalog Code:   aspirin ; Order Dt/Tm:   9/20/2014 9:41:12 AM CDT            ID Risk Screen   Recent Travel History :   No recent travel   Family Member Travel History :   No recent travel   Other Exposure to Infectious Disease :   Unknown   COVID-19 Testing Status :   No COVID-19 test performed   Freda Sanders CMA - 2/18/2021 11:59 AM CST

## 2022-02-16 NOTE — NURSING NOTE
Comprehensive Intake Entered On:  3/20/2019 9:16 AM CDT    Performed On:  3/20/2019 9:10 AM CDT by Candida Valladares MA               Summary   Chief Complaint :   Diabetic exam    Weight Measured :   239 lb(Converted to: 239 lb 0 oz, 108.41 kg)    Height Measured :   71 in(Converted to: 5 ft 11 in, 180.34 cm)    Body Mass Index :   33.33 kg/m2 (HI)    Body Surface Area :   2.33 m2   Systolic Blood Pressure :   118 mmHg   Diastolic Blood Pressure :   72 mmHg   Mean Arterial Pressure :   87 mmHg   Peripheral Pulse Rate :   61 bpm   BP Site :   Right arm   Pulse Site :   Radial artery   Oxygen Saturation :   97 %   Candida Valladares MA - 3/20/2019 9:10 AM CDT   Health Status   Allergies Verified? :   Yes   Medication History Verified? :   Yes   Immunizations Current :   Yes   Medical History Verified? :   No   Pre-Visit Planning Status :   Completed   Tobacco Use? :   Former smoker   Candida Valladares MA - 3/20/2019 9:10 AM CDT   Consents   Consent for Immunization Exchange :   Consent Granted   Consent for Immunizations to Providers :   Consent Granted   Candida Valladares MA - 3/20/2019 9:10 AM CDT   Meds / Allergies   (As Of: 3/20/2019 9:16:58 AM CDT)   Allergies (Active)   No Known Medication Allergies  Estimated Onset Date:   Unspecified ; Created By:   Toña Person CMA; Reaction Status:   Active ; Category:   Drug ; Substance:   No Known Medication Allergies ; Type:   Allergy ; Updated By:   Toña Person CMA; Reviewed Date:   11/7/2018 7:19 PM CST        Medication List   (As Of: 3/20/2019 9:16:58 AM CDT)   Prescription/Discharge Order    Miscellaneous Rx Supply  :   Miscellaneous Rx Supply ; Status:   Prescribed ; Ordered As Mnemonic:   Freestyle Kaycee monitor ; Simple Display Line:   See Instructions, Use as needed for glucose monitoring, 1 EA, 0 Refill(s) ; Ordering Provider:   Austen Mcdaniels MD; Catalog Code:   Miscellaneous Rx Supply ; Order Dt/Tm:   11/7/2018 11:24:10 AM           Miscellaneous Rx Supply  :   Miscellaneous Rx Supply ; Status:   Prescribed ; Ordered As Mnemonic:   Freestyle Kaycee patches ; Simple Display Line:   See Instructions, apply every 10 days, 3 EA, 6 Refill(s) ; Ordering Provider:   Austen Mcdaniels MD; Catalog Code:   Miscellaneous Rx Supply ; Order Dt/Tm:   11/7/2018 11:24:10 AM          Miscellaneous Rx Supply  :   Miscellaneous Rx Supply ; Status:   Prescribed ; Ordered As Mnemonic:   freestyle kaycee ; Simple Display Line:   See Instructions, monitor plus patches for continuous glucose monitoring, 3 EA, 5 Refill(s) ; Ordering Provider:   Austen Mcdaniels MD; Catalog Code:   Miscellaneous Rx Supply ; Order Dt/Tm:   11/6/2018 10:17:46 AM          insulin glargine  :   insulin glargine ; Status:   Prescribed ; Ordered As Mnemonic:   Basaglar KwikPen 100 units/mL subcutaneous solution ; Simple Display Line:   20 unit(s), Subcutaneous, hs, 15 mL, 3 Refill(s) ; Ordering Provider:   Austen Mcdaniels MD; Catalog Code:   insulin glargine ; Order Dt/Tm:   11/6/2018 8:32:02 AM          metFORMIN  :   metFORMIN ; Status:   Prescribed ; Ordered As Mnemonic:   metFORMIN 1000 mg oral tablet ; Simple Display Line:   1,000 mg, 1 tab(s), Oral, bid, for 90 day(s), 180 tab(s), 1 Refill(s) ; Ordering Provider:   Austen Mcdaniels MD; Catalog Code:   metFORMIN ; Order Dt/Tm:   8/14/2018 10:21:57 AM          dulaglutide  :   dulaglutide ; Status:   Prescribed ; Ordered As Mnemonic:   Trulicity Pen 1.5 mg/0.5 mL subcutaneous solution ; Simple Display Line:   See Instructions, INJECT 1.5 MG SUBCUTANEOUSLY EVERY WEEK, 2 mL, 7 Refill(s) ; Ordering Provider:   Austen Mcdaniels MD; Catalog Code:   dulaglutide ; Order Dt/Tm:   8/14/2018 10:21:56 AM          metoprolol  :   metoprolol ; Status:   Prescribed ; Ordered As Mnemonic:   Metoprolol Tartrate 25 mg oral tablet ; Simple Display Line:   25 mg, 1 tab(s), Oral, bid, for 90 day(s), 180 tab(s), 1 Refill(s) ; Ordering Provider:    Austen Mcdaniels MD; Catalog Code:   metoprolol ; Order Dt/Tm:   8/14/2018 10:21:53 AM          simvastatin  :   simvastatin ; Status:   Prescribed ; Ordered As Mnemonic:   simvastatin 80 mg oral tablet ; Simple Display Line:   See Instructions, TAKE ONE HALF TABLET BY MOUTH NIGHTLY AT BEDTIME, 45 tab(s), 1 Refill(s) ; Ordering Provider:   Austen Mcdaniels MD; Catalog Code:   simvastatin ; Order Dt/Tm:   8/14/2018 10:21:52 AM          Miscellaneous Prescription  :   Miscellaneous Prescription ; Status:   Prescribed ; Ordered As Mnemonic:   diabetic test strips ; Simple Display Line:   See Instructions, test bid, 1 bottle(s) ; Ordering Provider:   Austen Mcdaniels MD; Catalog Code:   Miscellaneous Prescription ; Order Dt/Tm:   4/8/2015 2:43:52 PM          acetaminophen-hydrocodone  :   acetaminophen-hydrocodone ; Status:   Prescribed ; Ordered As Mnemonic:   Norco 5 mg-325 mg oral tablet ; Simple Display Line:   1 tab(s), PO, q4hr, 24 tab(s), PRN: for pain ; Ordering Provider:   Austen Mcdaniels MD; Catalog Code:   acetaminophen-hydrocodone ; Order Dt/Tm:   8/12/2014 9:49:57 AM            Home Meds    aspirin  :   aspirin ; Status:   Documented ; Ordered As Mnemonic:   aspirin ; Simple Display Line:   81 mg, po, daily ; Catalog Code:   aspirin ; Order Dt/Tm:   9/20/2014 9:41:12 AM
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
## 2022-02-16 NOTE — TELEPHONE ENCOUNTER
---------------------  From: Austen Mcdaniels MD   To: LENNOX SAM    Sent: 7/22/2021 3:17:19 PM CDT  Subject: General Message       I hope to see you soon to discuss your much higher A1c and blood sugars    Results:  Date Result Name Ind Value Ref Range   7/21/2021 12:28 PM Sodium Level  136 mmol/L (135 - 146)   7/21/2021 12:28 PM Potassium Level  4.6 mmol/L (3.5 - 5.3)   7/21/2021 12:28 PM Chloride Level  100 mmol/L (98 - 110)   7/21/2021 12:28 PM CO2 Level  29 mmol/L (20 - 32)   7/21/2021 12:28 PM Glucose Level ((H)) 338 mg/dL (65 - 99)   7/21/2021 12:28 PM BUN  20 mg/dL (7 - 25)   7/21/2021 12:28 PM Creatinine Level  1.03 mg/dL (0.70 - 1.18)   7/21/2021 12:28 PM BUN/Creat Ratio  NOT APPLICABLE (6 - 22)   7/21/2021 12:28 PM eGFR  73 mL/min/1.73m2 (> OR = 60 - )   7/21/2021 12:28 PM eGFR African American  84 mL/min/1.73m2 (> OR = 60 - )   7/21/2021 12:28 PM Calcium Level  9.4 mg/dL (8.6 - 10.3)   7/21/2021 12:28 PM Hgb A1c ((H)) 8.6 ( - <5.7)

## 2022-02-16 NOTE — TELEPHONE ENCOUNTER
Order is faxed to [facility] and they will contact patient.  Patient informed.

## 2022-02-16 NOTE — TELEPHONE ENCOUNTER
---------------------  From: Austen Mcdaniels MD   To: LENNOX SAM    Sent: 6/23/2020 12:06:53 PM CDT  Subject: Patient Message - Results Notification        Your A1c is down.  It was 8.2 in February 2019 and 7.5 in November.   The trend is excellent    Results:  Date Result Name Ind Value Ref Range   6/17/2020 8:50 AM Sodium Level  141 mmol/L (135 - 146)   6/17/2020 8:50 AM Potassium Level  4.9 mmol/L (3.5 - 5.3)   6/17/2020 8:50 AM Chloride Level  104 mmol/L (98 - 110)   6/17/2020 8:50 AM CO2 Level  28 mmol/L (20 - 32)   6/17/2020 8:50 AM Glucose Level ((H)) 151 mg/dL (65 - 99)   6/17/2020 8:50 AM BUN  14 mg/dL (7 - 25)   6/17/2020 8:50 AM Creatinine Level  0.97 mg/dL (0.70 - 1.18)   6/17/2020 8:50 AM BUN/Creat Ratio  NOT APPLICABLE (6 - 22)   6/17/2020 8:50 AM eGFR  79 mL/min/1.73m2 (> OR = 60 - )   6/17/2020 8:50 AM eGFR African American  91 mL/min/1.73m2 (> OR = 60 - )   6/17/2020 8:50 AM Calcium Level  9.7 mg/dL (8.6 - 10.3)   6/17/2020 8:50 AM Hgb A1c ((H)) 7.2 ( - <5.7)

## 2022-02-16 NOTE — TELEPHONE ENCOUNTER
---------------------  From: Austen Mcdaniels MD   To: FLACO LENNOX LOREDO    Sent: 11/20/2019 8:58:47 AM CST  Subject: Patient Message - Results Notification        Your A1c did go down to below 8.   Your cholesterol is looking good.  The kidneys are filtering well    Results:  Date Result Name Ind Value Ref Range   11/19/2019 7:58 AM Hgb A1c ((H)) 7.5 ( - <5.7)   11/19/2019 7:58 AM Cholesterol  142 mg/dL ( - <200)   11/19/2019 7:58 AM Non-HDL Cholesterol  95 ( - <130)   11/19/2019 7:58 AM HDL  47 mg/dL (>40 - )   11/19/2019 7:58 AM Cholesterol/HDL Ratio  3.0 ( - <5.0)   11/19/2019 7:58 AM LDL  72    11/19/2019 7:58 AM Triglyceride  147 mg/dL ( - <150)   11/19/2019 7:58 AM U Microalbumin  0.6 mg/dL (See Note: - )   11/19/2019 7:58 AM Ur Creatinine  171 mg/dL (20 - 320)   11/19/2019 7:58 AM Ur Microalbumin/Creatinine Ratio  4 ( - <30)

## 2022-03-25 ENCOUNTER — DOCUMENTATION ONLY (OUTPATIENT)
Dept: LAB | Facility: CLINIC | Age: 72
End: 2022-03-25
Payer: MEDICARE

## 2022-03-25 DIAGNOSIS — E11.9 TYPE 2 DIABETES MELLITUS WITHOUT COMPLICATION, WITHOUT LONG-TERM CURRENT USE OF INSULIN (H): ICD-10-CM

## 2022-03-25 DIAGNOSIS — I25.10 CORONARY ARTERY DISEASE INVOLVING NATIVE HEART WITHOUT ANGINA PECTORIS, UNSPECIFIED VESSEL OR LESION TYPE: ICD-10-CM

## 2022-03-25 DIAGNOSIS — E78.5 DYSLIPIDEMIA: Primary | ICD-10-CM

## 2022-03-28 ENCOUNTER — LAB (OUTPATIENT)
Dept: LAB | Facility: CLINIC | Age: 72
End: 2022-03-28
Payer: MEDICARE

## 2022-03-28 DIAGNOSIS — I25.10 CORONARY ARTERY DISEASE INVOLVING NATIVE HEART WITHOUT ANGINA PECTORIS, UNSPECIFIED VESSEL OR LESION TYPE: ICD-10-CM

## 2022-03-28 DIAGNOSIS — E11.9 TYPE 2 DIABETES MELLITUS WITHOUT COMPLICATION, WITHOUT LONG-TERM CURRENT USE OF INSULIN (H): ICD-10-CM

## 2022-03-28 LAB
ALBUMIN SERPL-MCNC: 3.9 G/DL (ref 3.4–5)
ALP SERPL-CCNC: 52 U/L (ref 40–150)
ALT SERPL W P-5'-P-CCNC: 62 U/L (ref 0–70)
ANION GAP SERPL CALCULATED.3IONS-SCNC: 3 MMOL/L (ref 3–14)
AST SERPL W P-5'-P-CCNC: 36 U/L (ref 0–45)
BILIRUB SERPL-MCNC: 1.2 MG/DL (ref 0.2–1.3)
BUN SERPL-MCNC: 15 MG/DL (ref 7–30)
CALCIUM SERPL-MCNC: 9.2 MG/DL (ref 8.5–10.1)
CHLORIDE BLD-SCNC: 103 MMOL/L (ref 94–109)
CO2 SERPL-SCNC: 31 MMOL/L (ref 20–32)
CREAT SERPL-MCNC: 0.88 MG/DL (ref 0.66–1.25)
CREAT UR-MCNC: 92 MG/DL
ERYTHROCYTE [DISTWIDTH] IN BLOOD BY AUTOMATED COUNT: 12.9 % (ref 10–15)
GFR SERPL CREATININE-BSD FRML MDRD: >90 ML/MIN/1.73M2
GLUCOSE BLD-MCNC: 306 MG/DL (ref 70–99)
HBA1C MFR BLD: 7.7 % (ref 0–5.6)
HCT VFR BLD AUTO: 44.6 % (ref 40–53)
HGB BLD-MCNC: 14.7 G/DL (ref 13.3–17.7)
MCH RBC QN AUTO: 30.4 PG (ref 26.5–33)
MCHC RBC AUTO-ENTMCNC: 33 G/DL (ref 31.5–36.5)
MCV RBC AUTO: 92 FL (ref 78–100)
MICROALBUMIN UR-MCNC: 6 MG/L
MICROALBUMIN/CREAT UR: 6.52 MG/G CR (ref 0–17)
PLATELET # BLD AUTO: 139 10E3/UL (ref 150–450)
POTASSIUM BLD-SCNC: 4.3 MMOL/L (ref 3.4–5.3)
PROT SERPL-MCNC: 7.4 G/DL (ref 6.8–8.8)
RBC # BLD AUTO: 4.83 10E6/UL (ref 4.4–5.9)
SODIUM SERPL-SCNC: 137 MMOL/L (ref 133–144)
WBC # BLD AUTO: 6.4 10E3/UL (ref 4–11)

## 2022-03-28 PROCEDURE — 82043 UR ALBUMIN QUANTITATIVE: CPT

## 2022-03-28 PROCEDURE — 83036 HEMOGLOBIN GLYCOSYLATED A1C: CPT

## 2022-03-28 PROCEDURE — 36415 COLL VENOUS BLD VENIPUNCTURE: CPT

## 2022-03-28 PROCEDURE — 85027 COMPLETE CBC AUTOMATED: CPT

## 2022-03-28 PROCEDURE — 80053 COMPREHEN METABOLIC PANEL: CPT

## 2022-03-30 DIAGNOSIS — E11.9 DIABETES MELLITUS (H): Primary | ICD-10-CM

## 2022-04-01 RX ORDER — AMOXICILLIN 500 MG/1
CAPSULE ORAL
COMMUNITY
Start: 2021-11-08 | End: 2022-08-15

## 2022-04-01 RX ORDER — PEN NEEDLE, DIABETIC 32GX 5/32"
NEEDLE, DISPOSABLE MISCELLANEOUS
COMMUNITY
Start: 2021-02-24 | End: 2022-04-04

## 2022-04-01 NOTE — TELEPHONE ENCOUNTER
"Routing refill request to provider for review/approval because:  Drug not active on patient's medication list    Last Written Prescription Date:  2/24/21  Last Fill Quantity: 100,  # refills: 3   Last office visit provider:  12/29/21     Requested Prescriptions   Pending Prescriptions Disp Refills     insulin pen needle (32G X 4 MM) 32G X 4 MM miscellaneous 100 each 0     Sig: Use with Basaglar once daily       Diabetic Supplies Protocol Failed - 3/30/2022  9:19 AM        Failed - Medication is active on med list        Passed - Patient is 18 years of age or older        Passed - Recent (6 mo) or future (30 days) visit within the authorizing provider's specialty     Patient had office visit in the last 6 months or has a visit in the next 30 days with authorizing provider.  See \"Patient Info\" tab in inbasket, or \"Choose Columns\" in Meds & Orders section of the refill encounter.                 Karmen Pickens RN 04/01/22 1:19 PM  "

## 2022-04-04 ENCOUNTER — OFFICE VISIT (OUTPATIENT)
Dept: FAMILY MEDICINE | Facility: CLINIC | Age: 72
End: 2022-04-04
Payer: MEDICARE

## 2022-04-04 VITALS
BODY MASS INDEX: 33.61 KG/M2 | WEIGHT: 241 LBS | HEART RATE: 58 BPM | SYSTOLIC BLOOD PRESSURE: 138 MMHG | DIASTOLIC BLOOD PRESSURE: 76 MMHG

## 2022-04-04 DIAGNOSIS — Z23 NEED FOR VACCINATION: Primary | ICD-10-CM

## 2022-04-04 DIAGNOSIS — Z79.4 TYPE 2 DIABETES MELLITUS WITHOUT COMPLICATION, WITH LONG-TERM CURRENT USE OF INSULIN (H): ICD-10-CM

## 2022-04-04 DIAGNOSIS — E11.9 TYPE 2 DIABETES MELLITUS WITHOUT COMPLICATION, WITH LONG-TERM CURRENT USE OF INSULIN (H): ICD-10-CM

## 2022-04-04 PROBLEM — K63.5 POLYP OF COLON: Status: ACTIVE | Noted: 2022-04-04

## 2022-04-04 PROBLEM — N20.0 CALCULUS OF KIDNEY: Status: ACTIVE | Noted: 2022-04-04

## 2022-04-04 PROBLEM — Z79.84 DIABETES MELLITUS TREATED WITH ORAL MEDICATION (H): Status: ACTIVE | Noted: 2022-04-04

## 2022-04-04 PROCEDURE — 99213 OFFICE O/P EST LOW 20 MIN: CPT | Performed by: FAMILY MEDICINE

## 2022-04-04 PROCEDURE — 91306 COVID-19,PF,MODERNA (18+ YRS BOOSTER .25ML): CPT | Performed by: FAMILY MEDICINE

## 2022-04-04 PROCEDURE — 0064A COVID-19,PF,MODERNA (18+ YRS BOOSTER .25ML): CPT | Performed by: FAMILY MEDICINE

## 2022-04-04 RX ORDER — INSULIN GLARGINE 100 [IU]/ML
28 INJECTION, SOLUTION SUBCUTANEOUS AT BEDTIME
Qty: 25.2 ML | Refills: 1 | Status: SHIPPED | OUTPATIENT
Start: 2022-04-04 | End: 2022-08-15

## 2022-04-04 RX ORDER — INSULIN GLARGINE 100 [IU]/ML
25 INJECTION, SOLUTION SUBCUTANEOUS AT BEDTIME
COMMUNITY
Start: 2021-12-31 | End: 2022-04-04

## 2022-04-04 RX ORDER — DULAGLUTIDE 3 MG/.5ML
3 INJECTION, SOLUTION SUBCUTANEOUS
COMMUNITY
Start: 2022-03-23 | End: 2023-03-06

## 2022-04-04 RX ORDER — BLOOD SUGAR DIAGNOSTIC
STRIP MISCELLANEOUS
Qty: 100 STRIP | Refills: 3 | Status: SHIPPED | OUTPATIENT
Start: 2022-04-04 | End: 2023-06-16

## 2022-04-04 NOTE — PROGRESS NOTES
"  Assessment & Plan     Type 2 diabetes mellitus without complication, with long-term current use of insulin (H)  His A1c is 7.7 most recently.  He considers this okay but not terrific.  He was traveling for most of the winter and spent a lot of time in [place] in [place] water really good fluids and he was more active.  He is trying to adjust both activity and eating he does not want to increase his insulin right now because he feels like it will improve  - blood glucose (ACCU-CHEK GUIDE) test strip; Use to test blood sugar 2 times daily or as directed.  - insulin glargine (BASAGLAR KWIKPEN) 100 UNIT/ML pen; Inject 28 Units Subcutaneous At Bedtime  - blood glucose (NO BRAND SPECIFIED) test strip; 1 strip by In Vitro route 2 times daily Use to test blood sugar 2 times daily or as directed.      Need for vaccination  He qualifies for the fourth vaccine and it was given  - COVID-19,PF,MODERNA (18+ YRS BOOSTER .25ML)  956}     BMI:   Estimated body mass index is 33.61 kg/m  as calculated from the following:    Height as of 7/21/21: 1.803 m (5' 11\").    Weight as of this encounter: 109.3 kg (241 lb).   Weight management plan: Discussed healthy diet and exercise guidelines    Work on weight loss  Regular exercise  We will follow up.  4 months    No follow-ups on file.    Austen Mcdaniels MD  [facility]    Christopher Killian is a 71 year old who presents for the following health issues     History of Present Illness       Diabetes:   He presents for follow up of diabetes.  He is checking home blood glucose a few times a week. He checks blood glucose before meals.  Blood glucose is never over 200 and never under 70. He is aware of hypoglycemia symptoms including lethargy. He is concerned about blood sugar frequently over 200.  He is having numbness in feet. The patient has had a diabetic eye exam in the last 12 months. Location of last eye exam [facility].        He eats 2-3 " servings of fruits and vegetables daily.He consumes 1 sweetened beverage(s) daily.He exercises with enough effort to increase his heart rate 30 to 60 minutes per day.  He exercises with enough effort to increase his heart rate 3 or less days per week.   He is taking medications regularly.             Review of Systems   Constitutional, HEENT, cardiovascular, pulmonary, gi and gu systems are negative, except as otherwise noted.      Objective    There were no vitals taken for this visit.  There is no height or weight on file to calculate BMI.  Physical Exam   GENERAL: healthy, alert and no distress  NECK: no adenopathy, no asymmetry, masses, or scars and thyroid normal to palpation  RESP: lungs clear to auscultation - no rales, rhonchi or wheezes  CV: regular rate and rhythm, normal S1 S2, no S3 or S4, no murmur, click or rub, no peripheral edema and peripheral pulses strong  ABDOMEN: soft, nontender, no hepatosplenomegaly, no masses and bowel sounds normal  MS: no gross musculoskeletal defects noted, no edema  SKIN: no suspicious lesions or rashes  Diabetic foot exam: normal DP and PT pulses, no trophic changes or ulcerative lesions, normal sensory exam and normal monofilament exam

## 2022-04-12 ENCOUNTER — APPOINTMENT (OUTPATIENT)
Dept: AUDIOLOGY | Facility: CLINIC | Age: 72
End: 2022-04-12
Payer: MEDICARE

## 2022-05-29 ENCOUNTER — HEALTH MAINTENANCE LETTER (OUTPATIENT)
Age: 72
End: 2022-05-29

## 2022-06-24 DIAGNOSIS — I21.9 MYOCARDIAL INFARCTION (H): Primary | ICD-10-CM

## 2022-06-24 RX ORDER — METOPROLOL TARTRATE 25 MG/1
25 TABLET, FILM COATED ORAL 2 TIMES DAILY
Qty: 60 TABLET | Refills: 1 | Status: SHIPPED | OUTPATIENT
Start: 2022-06-24 | End: 2022-08-15

## 2022-07-23 DIAGNOSIS — E11.9 DIABETES MELLITUS (H): ICD-10-CM

## 2022-07-24 ENCOUNTER — HEALTH MAINTENANCE LETTER (OUTPATIENT)
Age: 72
End: 2022-07-24

## 2022-07-25 ENCOUNTER — TELEPHONE (OUTPATIENT)
Dept: FAMILY MEDICINE | Facility: CLINIC | Age: 72
End: 2022-07-25

## 2022-07-25 DIAGNOSIS — E11.9 TYPE 2 DIABETES MELLITUS WITHOUT COMPLICATION, WITHOUT LONG-TERM CURRENT USE OF INSULIN (H): Primary | ICD-10-CM

## 2022-07-25 RX ORDER — PEN NEEDLE, DIABETIC 32GX 5/32"
NEEDLE, DISPOSABLE MISCELLANEOUS
Qty: 100 EACH | Refills: 1 | Status: SHIPPED | OUTPATIENT
Start: 2022-07-25 | End: 2022-08-15

## 2022-07-25 NOTE — TELEPHONE ENCOUNTER
I called and spoke with patient, he states that he needs an annual scheduled, I helped patient with that.    Besides A1C and cholesterol patient has no further concerns for lab work.

## 2022-07-25 NOTE — TELEPHONE ENCOUNTER
Please call patient.  He is reportedly on the lab schedule for tomorrow.  His physician Dr. Mcdaniels has left the clinic.  There are not orders already in the chart.    It looks like he is due for an A1c and fasting cholesterol panel.  Please check with him to see if there were other things he was coming in for or if he had other concerns he wanted to make sure we check and then let me know so I can put in orders.  Please see who is planning on following up with.  Thanks

## 2022-07-26 ENCOUNTER — LAB (OUTPATIENT)
Dept: LAB | Facility: CLINIC | Age: 72
End: 2022-07-26
Payer: MEDICARE

## 2022-07-26 DIAGNOSIS — E11.9 TYPE 2 DIABETES MELLITUS WITHOUT COMPLICATION, WITHOUT LONG-TERM CURRENT USE OF INSULIN (H): ICD-10-CM

## 2022-07-26 LAB
CHOLEST SERPL-MCNC: 153 MG/DL
HBA1C MFR BLD: 8 % (ref 0–5.6)
HDLC SERPL-MCNC: 49 MG/DL
LDLC SERPL CALC-MCNC: 74 MG/DL
NONHDLC SERPL-MCNC: 104 MG/DL
TRIGL SERPL-MCNC: 149 MG/DL

## 2022-07-26 PROCEDURE — 36415 COLL VENOUS BLD VENIPUNCTURE: CPT

## 2022-07-26 PROCEDURE — 80061 LIPID PANEL: CPT

## 2022-07-26 PROCEDURE — 83036 HEMOGLOBIN GLYCOSYLATED A1C: CPT

## 2022-08-15 ENCOUNTER — OFFICE VISIT (OUTPATIENT)
Dept: FAMILY MEDICINE | Facility: CLINIC | Age: 72
End: 2022-08-15
Payer: MEDICARE

## 2022-08-15 VITALS
TEMPERATURE: 97.7 F | WEIGHT: 233.9 LBS | HEART RATE: 43 BPM | DIASTOLIC BLOOD PRESSURE: 81 MMHG | SYSTOLIC BLOOD PRESSURE: 136 MMHG | HEIGHT: 71 IN | BODY MASS INDEX: 32.75 KG/M2

## 2022-08-15 DIAGNOSIS — E78.5 DYSLIPIDEMIA: Chronic | ICD-10-CM

## 2022-08-15 DIAGNOSIS — Z79.4 TYPE 2 DIABETES MELLITUS WITHOUT COMPLICATION, WITH LONG-TERM CURRENT USE OF INSULIN (H): ICD-10-CM

## 2022-08-15 DIAGNOSIS — I21.9 MYOCARDIAL INFARCTION, UNSPECIFIED MI TYPE, UNSPECIFIED ARTERY (H): ICD-10-CM

## 2022-08-15 DIAGNOSIS — Z00.00 ENCOUNTER FOR MEDICARE ANNUAL WELLNESS EXAM: Primary | ICD-10-CM

## 2022-08-15 DIAGNOSIS — E11.9 TYPE 2 DIABETES MELLITUS WITHOUT COMPLICATION, WITH LONG-TERM CURRENT USE OF INSULIN (H): ICD-10-CM

## 2022-08-15 PROCEDURE — 99214 OFFICE O/P EST MOD 30 MIN: CPT | Mod: 25 | Performed by: FAMILY MEDICINE

## 2022-08-15 PROCEDURE — G0439 PPPS, SUBSEQ VISIT: HCPCS | Performed by: FAMILY MEDICINE

## 2022-08-15 RX ORDER — INSULIN GLARGINE 100 [IU]/ML
32 INJECTION, SOLUTION SUBCUTANEOUS AT BEDTIME
Qty: 25.2 ML | Refills: 1 | Status: SHIPPED | OUTPATIENT
Start: 2022-08-15 | End: 2022-09-28

## 2022-08-15 RX ORDER — METOPROLOL TARTRATE 25 MG/1
25 TABLET, FILM COATED ORAL 2 TIMES DAILY
Qty: 180 TABLET | Refills: 1 | Status: SHIPPED | OUTPATIENT
Start: 2022-08-15 | End: 2023-04-14

## 2022-08-15 RX ORDER — SIMVASTATIN 40 MG
40 TABLET ORAL AT BEDTIME
Qty: 90 TABLET | Refills: 3 | Status: ON HOLD | OUTPATIENT
Start: 2022-08-15 | End: 2023-07-22

## 2022-08-15 RX ORDER — PEN NEEDLE, DIABETIC 32GX 5/32"
NEEDLE, DISPOSABLE MISCELLANEOUS
Qty: 100 EACH | Refills: 3 | Status: SHIPPED | OUTPATIENT
Start: 2022-08-15 | End: 2023-09-01

## 2022-08-15 ASSESSMENT — ENCOUNTER SYMPTOMS
HEADACHES: 0
NERVOUS/ANXIOUS: 0
DIZZINESS: 0
HEARTBURN: 0
COUGH: 0
ABDOMINAL PAIN: 0
SORE THROAT: 0
FREQUENCY: 1
HEMATOCHEZIA: 0
PALPITATIONS: 0
NAUSEA: 0
ARTHRALGIAS: 1
PARESTHESIAS: 0
SHORTNESS OF BREATH: 0
CHILLS: 0
FEVER: 0
WEAKNESS: 0
MYALGIAS: 0
JOINT SWELLING: 0
CONSTIPATION: 0
DYSURIA: 0
HEMATURIA: 0
DIARRHEA: 0
EYE PAIN: 0

## 2022-08-15 ASSESSMENT — ACTIVITIES OF DAILY LIVING (ADL): CURRENT_FUNCTION: NO ASSISTANCE NEEDED

## 2022-08-15 NOTE — PATIENT INSTRUCTIONS
Patient Education   Personalized Prevention Plan  You are due for the preventive services outlined below.  Your care team is available to assist you in scheduling these services.  If you have already completed any of these items, please share that information with your care team to update in your medical record.  Health Maintenance Due   Topic Date Due     ANNUAL REVIEW OF HM ORDERS  Never done     Hepatitis C Screening  Never done     LUNG CANCER SCREENING  Never done     Zoster (Shingles) Vaccine (2 of 3) 02/06/2013     Pneumococcal Vaccine (2 - PPSV23 or PCV20) 09/11/2014     AORTIC ANEURYSM SCREENING (SYSTEM ASSIGNED)  Never done       Exercise for a Healthier Heart  You may wonder how you can improve the health of your heart. If you re thinking about exercise, you re on the right track. You don t need to become an athlete. But you do need a certain amount of brisk exercise to help strengthen your heart. If you have been diagnosed with a heart condition, your healthcare provider may advise exercise to help stabilize your condition. To help make exercise a habit, choose safe, fun activities.      Exercise with a friend. When activity is fun, you're more likely to stick with it.   Before you start  Check with your healthcare provider before starting an exercise program. This is especially important if you have not been active for a while. It's also important if you have a long-term (chronic) health problem such as heart disease, diabetes, or obesity. Or if you are at high risk for having these problems.   Why exercise?  Exercising regularly offers many healthy rewards. It can help you do all of the following:     Improve your blood cholesterol level to help prevent further heart trouble    Lower your blood pressure to help prevent a stroke or heart attack    Control diabetes, or reduce your risk of getting this disease    Improve your heart and lung function    Reach and stay at a healthy weight    Make your  muscles stronger so you can stay active    Prevent falls and fractures by slowing the loss of bone mass (osteoporosis)    Manage stress better    Reduce your blood pressure    Improve your sense of self and your body image  Exercise tips      Ease into your routine. Set small goals. Then build on them. If you are not sure what your activity level should be, talk with your healthcare provider first before starting an exercise routine.    Exercise on most days. Aim for a total of 150 minutes (2 hours and 30 minutes) or more of moderate-intensity aerobic activity each week. Or 75 minutes (1 hour and 15 minutes) or more of vigorous-intensity aerobic activity each week. Or try for a combination of both. Moderate activity means that you breathe heavier and your heart rate increases but you can still talk. Think about doing 40 minutes of moderate exercise, 3 to 4 times a week. For best results, activity should last for about 40 minutes to lower blood pressure and cholesterol. It's OK to work up to the 40-minute period over time. Examples of moderate-intensity activity are walking 1 mile in 15 minutes. Or doing 30 to 45 minutes of yard work.    Step up your daily activity level.  Along with your exercise program, try being more active the whole day. Walk instead of drive. Or park further away so that you take more steps each day. Do more household tasks or yard work. You may not be able to meet the advised mount of physical activity. But doing some moderate- or vigorous-intensity aerobic activity can help reduce your risk for heart disease. Your healthcare provider can help you figure out what is best for you.    Choose 1 or more activities you enjoy.  Walking is one of the easiest things you can do. You can also try swimming, riding a bike, dancing, or taking an exercise class.    When to call your healthcare provider  Call your healthcare provider if you have any of these:     Chest pain or feel dizzy or  lightheaded    Burning, tightness, pressure, or heaviness in your chest, neck, shoulders, back, or arms    Abnormal shortness of breath    More joint or muscle pain    A very fast or irregular heartbeat (palpitations)  [employer] last reviewed this educational content on 7/1/2019 2000-2021 [employer]. All rights reserved. This information is not intended as a substitute for professional medical care. Always follow your healthcare professional's instructions.         Patient Education   Alcohol Use     Many people can enjoy a glass of wine or beer without any negative consequences to their health. According to the Centers for Disease Control and Prevention (CDC), having one or fewer drinks per day for women and two or fewer per day for men is considered moderate drinking.     When people drink more than moderately, it can become concerning. Excessive drinking is defined as consuming 15 drinks or more per week for men and 8 drinks or more per week for women. There are various health problems associated with excessive drinking, which include:    Damage to vital organs like the heart, brain, liver and pancreas    Harm to the digestive tract    Weaken the immune system    Higher risk for heart disease and cancer    There are many resources available to people who are addicted to alcohol. A counselor or other health care provider can give you support. So can a , , or rabbi who is trained in substance abuse counseling. Friends and family may also help once you are connected with professionals.           Urinary Incontinence (Male)    Urinary incontinence means not being able to control the release of urine from the bladder.   Causes  Common causes of urinary incontinence in men include:    Infection    Certain medicines    Aging    Poor pelvic muscle tone    Bladder spasms    Obesity    Trouble urinating and fully emptying the bladder (urinary retention)  Other things that can cause  incontinence are:     Nervous system diseases    Diabetes    Sleep apnea    Urinary tract infections    Prostate surgery    Pelvic injury  Constipation and smoking have also been identified as risk factors.   Symptoms    Urge incontinence (overactive bladder). This is a sudden urge to urinate. It occurs even though there may not be much urine in the bladder. The need to urinate often during the night is common. It's due to bladder spasms.    Stress incontinence. This is urine leakage that you can't control. It can occur with sneezing, coughing, and other actions that put stress on the bladder.    Treatment  Treatment depends on what is causing the condition. Bladder infections are treated with antibiotics. Urinary retention is treated with a bladder catheter.   Home care  Follow these guidelines when caring for yourself at home:    Don't have any foods and drinks that may irritate the bladder. This includes:  ? Chocolate  ? Alcohol  ? Caffeine  ? Carbonated drinks  ? Acidic fruits and juices    Limit fluids to 6 to 8 cups a day.    Lose weight if you are overweight. This will reduce your symptoms.    If advised, do regular pelvic muscle-strengthening exercises such as Kegel exercises.    If needed, wear absorbent pads to catch urine. Change the pads often. This is for good hygiene and to prevent skin and bladder infections.    Bathe daily for good hygiene.    If an antibiotic was prescribed to treat a bladder infection, take it until it's finished. Keep taking it even if you are feeling better. This is to make sure your infection has cleared.    If a catheter was left in place, keep bacteria from getting into the collection bag. Don't disconnect the catheter from the collection bag.    Use a leg band to secure the catheter drainage tube, so it does not pull on the catheter. Drain the collection bag when it becomes full. To do this, use the drain spout at the bottom of the bag. Don't disconnect the bag from the  catheter.    Don't pull on or try to remove a catheter. The catheter must be removed by a healthcare provider.    If you smoke, stop. Ask your provider for help if you can't do this on your own.  Follow-up care  Follow up with your healthcare provider, or as advised.  When to get medical advice  Call your healthcare provider right away if any of these occur:    Fever over 100.4 F (38 C), or as directed by your provider    Bladder pain or fullness    Belly swelling, nausea, or vomiting    Back pain    Weakness, dizziness, or fainting    If a catheter was left in place, return if:  ? The catheter falls out  ? The catheter stops draining for 6 hours  ? Your urine gets cloudy or smells bad  [employer] last reviewed this educational content on 1/1/2020 2000-2021 [employer]. All rights reserved. This information is not intended as a substitute for professional medical care. Always follow your healthcare professional's instructions.

## 2022-08-15 NOTE — PROGRESS NOTES
"SUBJECTIVE:   Constantin Pa is a 71 year old male who presents for Preventive Visit.      Patient has been advised of split billing requirements and indicates understanding: Yes  Are you in the first 12 months of your Medicare coverage?  No    Healthy Habits:     In general, how would you rate your overall health?  Good    Frequency of exercise:  1 day/week    Duration of exercise:  45-60 minutes    Do you usually eat at least 4 servings of fruit and vegetables a day, include whole grains    & fiber and avoid regularly eating high fat or \"junk\" foods?  Yes    Taking medications regularly:  Yes    Medication side effects:  Not applicable    Ability to successfully perform activities of daily living:  No assistance needed    Home Safety:  No safety concerns identified    Hearing Impairment:  No hearing concerns    In the past 6 months, have you been bothered by leaking of urine? Yes    In general, how would you rate your overall mental or emotional health?  Excellent      PHQ-2 Total Score: 0    Additional concerns today:  No    Do you feel safe in your environment? Yes    Have you ever done Advance Care Planning? (For example, a Health Directive, POLST, or a discussion with a medical provider or your loved ones about your wishes): Yes, patient states has an Advance Care Planning document and will bring a copy to the clinic.     Not done. Patient wears hearing aids     Fall risk  Fallen 2 or more times in the past year?: No  Any fall with injury in the past year?: No    Cognitive Screening   1) Repeat 3 items (Leader, Season, Table)    2) Clock draw: NORMAL  3) 3 item recall: Recalls 3 objects  Results: 3 items recalled: COGNITIVE IMPAIRMENT LESS LIKELY    Mini-CogTM Copyright ELKIN Ng. Licensed by the author for use in [facility]; reprinted with permission ([email or web address]). All rights reserved.      Patient is here for chronic disease follow up      DM     Oral agents: Metformin  Insulin: Glargine  Other " injectable: Trulicity  BG checks: Occasionally  Avg home glucose: 180s  HgbA1c: 8.0  HTN: Controlled  Lipids: At goal  BMP: Good renal function  Smoking: No  Eye exam: Due, will schedule in the next few weeks    HTN      Current medications: Metoprolol  Medication side effects:  none   Medication concerns:  none   Talking medications as prescribed:  yes  Blood pressure controlled:  yes  Home monitor numbers:  Does not check   End organ symptoms:  none    IVD     Current medications:  metoprolol  Blood pressure:  controlled  Tobacco use:  quit  Aspirin use:  yes   Statin:  yes  Angina/dyspnea:  no    HLD     Medication:  simvastatin  LDL:  At goal  Myalgia:  no    Reviewed and updated as needed this visit by clinical staff   Tobacco  Allergies  Meds                Reviewed and updated as needed this visit by Provider                   Social History     Tobacco Use     Smoking status: Former Smoker     Smokeless tobacco: Never Used   Substance Use Topics     Alcohol use: Yes     Alcohol/week: 0.0 standard drinks         Alcohol Use 8/15/2022   Prescreen: >3 drinks/day or >7 drinks/week? Yes   AUDIT SCORE  6         Current providers sharing in care for this patient include:   Patient Care Team:  Robin Colby MD as PCP - General (Family Medicine)  Carlito Teague MD as Assigned PCP    The following health maintenance items are reviewed in Epic and correct as of today:  Health Maintenance Due   Topic Date Due     ANNUAL REVIEW OF HM ORDERS  Never done     ADVANCE CARE PLANNING  Never done     HEPATITIS C SCREENING  Never done     LUNG CANCER SCREENING  Never done     ZOSTER IMMUNIZATION (2 of 3) 02/06/2013     Pneumococcal Vaccine: 65+ Years (2 - PPSV23 or PCV20) 09/11/2014     AORTIC ANEURYSM SCREENING (SYSTEM ASSIGNED)  Never done     Labs reviewed in EPIC          Review of Systems   Constitutional: Negative for chills and fever.   HENT: Negative for congestion, ear pain, hearing loss and sore throat.   "  Eyes: Positive for visual disturbance. Negative for pain.   Respiratory: Negative for cough and shortness of breath.    Cardiovascular: Negative for chest pain, palpitations and peripheral edema.   Gastrointestinal: Negative for abdominal pain, constipation, diarrhea, heartburn, hematochezia and nausea.   Genitourinary: Positive for frequency and impotence. Negative for dysuria, genital sores, hematuria, penile discharge and urgency.   Musculoskeletal: Positive for arthralgias. Negative for joint swelling and myalgias.   Skin: Negative for rash.   Neurological: Negative for dizziness, weakness, headaches and paresthesias.   Psychiatric/Behavioral: Negative for mood changes. The patient is not nervous/anxious.          OBJECTIVE:   /81 (BP Location: Right arm)   Pulse (!) 43   Temp 97.7  F (36.5  C)   Ht 1.797 m (5' 10.75\")   Wt 106.1 kg (233 lb 14.4 oz)   BMI 32.85 kg/m   Estimated body mass index is 32.85 kg/m  as calculated from the following:    Height as of this encounter: 1.797 m (5' 10.75\").    Weight as of this encounter: 106.1 kg (233 lb 14.4 oz).  Physical Exam  GENERAL: healthy, alert and no distress  EYES: Eyes grossly normal to inspection, PERRL and conjunctivae and sclerae normal  NECK: no adenopathy, no asymmetry, masses, or scars and thyroid normal to palpation  RESP: lungs clear to auscultation - no rales, rhonchi or wheezes  CV: regular rate and rhythm, normal S1 S2, no S3 or S4, no murmur, click or rub, no peripheral edema and peripheral pulses strong  ABDOMEN: soft, nontender, no hepatosplenomegaly, no masses and bowel sounds normal  MS: no gross musculoskeletal defects noted, no edema  SKIN: no suspicious lesions or rashes    Diagnostic Test Results:  Labs reviewed in Epic    ASSESSMENT / PLAN:   (Z00.00) Encounter for Medicare annual wellness exam  (primary encounter diagnosis)  Comment: Doing well  Plan: Discussed health maintenance, appropriate diet, activity level    (E78.5) " "Dyslipidemia  Comment: Controlled  Plan: simvastatin (ZOCOR) 40 MG tablet        Continue current medication    (E11.9,  Z79.4) Type 2 diabetes mellitus without complication, with long-term current use of insulin (H)  Comment: Hemoglobin A1c is elevated at 8.0.  Plan: insulin pen needle (BD PEN NEEDLE LIVAN 2ND GEN)        32G X 4 MM miscellaneous, insulin glargine         (BASAGLAR KWIKPEN) 100 UNIT/ML pen, metFORMIN         (GLUCOPHAGE) 1000 MG tablet        Discussed goal of less than 7.5.  Discussed correlation between the average blood glucose and hemoglobin A1c.  He will start monitoring his blood glucose more frequently.  I have asked him to increase his insulin glargine to 32 units daily    (I21.9) Myocardial infarction, unspecified MI type, unspecified artery (H)  Comment: Stable  Plan: metoprolol tartrate (LOPRESSOR) 25 MG tablet        Continue current medication, discussed angina and anginal equivalents      Patient has been advised of split billing requirements and indicates understanding: Yes    COUNSELING:  Reviewed preventive health counseling, as reflected in patient instructions       Regular exercise       Healthy diet/nutrition       Vision screening       Dental care       Bladder control       Fall risk prevention       Aspirin prophylaxis        Alcohol Use        Colon cancer screening       Prostate cancer screening    Estimated body mass index is 32.85 kg/m  as calculated from the following:    Height as of this encounter: 1.797 m (5' 10.75\").    Weight as of this encounter: 106.1 kg (233 lb 14.4 oz).    Weight management plan: Discussed healthy diet and exercise guidelines    He reports that he has quit smoking. He has never used smokeless tobacco.      Appropriate preventive services were discussed with this patient, including applicable screening as appropriate for cardiovascular disease, diabetes, osteopenia/osteoporosis, and glaucoma.  As appropriate for age/gender, discussed screening " for colorectal cancer, prostate cancer, breast cancer, and cervical cancer. Checklist reviewing preventive services available has been given to the patient.    Reviewed patients plan of care and provided an AVS. The Basic Care Plan (routine screening as documented in Health Maintenance) for Constantin meets the Care Plan requirement. This Care Plan has been established and reviewed with the Patient.    Counseling Resources:  ATP IV Guidelines  Pooled Cohorts Equation Calculator  Breast Cancer Risk Calculator  Breast Cancer: Medication to Reduce Risk  FRAX Risk Assessment  ICSI Preventive Guidelines  Dietary Guidelines for Americans, 2010  USDA's MyPlate  ASA Prophylaxis  Lung CA Screening    Robin Colby MD  [facility]    Identified Health Risks:    He is at risk for lack of exercise and has been provided with information to increase physical activity for the benefit of his well-being.  The patient reports that he drinks more than one alcoholic drink per day but denies binge or excessive drinking. He was counseled and given information about possible harmful effects of excessive alcohol intake.  Information on urinary incontinence and treatment options given to patient.

## 2022-09-15 DIAGNOSIS — E11.9 TYPE 2 DIABETES MELLITUS WITHOUT COMPLICATION, WITH LONG-TERM CURRENT USE OF INSULIN (H): ICD-10-CM

## 2022-09-15 DIAGNOSIS — Z79.4 TYPE 2 DIABETES MELLITUS WITHOUT COMPLICATION, WITH LONG-TERM CURRENT USE OF INSULIN (H): ICD-10-CM

## 2022-09-15 NOTE — TELEPHONE ENCOUNTER
Last Written Prescription Date:  8/15/22  Last Fill Quantity: 180,  # refills: 3   Last office visit: 8/15/2022   Future Office Visit:          Pending Prescriptions:                       Disp   Refills    metFORMIN (GLUCOPHAGE) 1000 MG tablet     180 ta*3            Sig: Take 1 tablet (1,000 mg) by mouth 2 times daily           (with meals)    Refill request denied. Pharmacy should have refills on file.

## 2022-09-28 DIAGNOSIS — Z79.4 TYPE 2 DIABETES MELLITUS WITHOUT COMPLICATION, WITH LONG-TERM CURRENT USE OF INSULIN (H): ICD-10-CM

## 2022-09-28 DIAGNOSIS — E11.9 TYPE 2 DIABETES MELLITUS WITHOUT COMPLICATION, WITH LONG-TERM CURRENT USE OF INSULIN (H): ICD-10-CM

## 2022-09-28 RX ORDER — INSULIN GLARGINE 100 [IU]/ML
INJECTION, SOLUTION SUBCUTANEOUS
Qty: 32 ML | Refills: 1 | Status: SHIPPED | OUTPATIENT
Start: 2022-09-28 | End: 2023-03-06

## 2022-09-28 NOTE — TELEPHONE ENCOUNTER
Insulin Glargine dose changed at last visit on 8/15/22.  Last Written Prescription Date: 8/15/22  Last Fill Quantity: 25.2ml,  # refills: 1   Last office visit: 8/15/2022 with prescribing provider

## 2022-10-03 ENCOUNTER — HEALTH MAINTENANCE LETTER (OUTPATIENT)
Age: 72
End: 2022-10-03

## 2023-01-16 ENCOUNTER — TRANSFERRED RECORDS (OUTPATIENT)
Dept: HEALTH INFORMATION MANAGEMENT | Facility: CLINIC | Age: 73
End: 2023-01-16
Payer: MEDICARE

## 2023-01-16 LAB — RETINOPATHY: NEGATIVE

## 2023-02-05 ENCOUNTER — MEDICAL CORRESPONDENCE (OUTPATIENT)
Dept: HEALTH INFORMATION MANAGEMENT | Facility: CLINIC | Age: 73
End: 2023-02-05

## 2023-02-12 ENCOUNTER — HEALTH MAINTENANCE LETTER (OUTPATIENT)
Age: 73
End: 2023-02-12

## 2023-02-21 ENCOUNTER — APPOINTMENT (OUTPATIENT)
Dept: AUDIOLOGY | Facility: CLINIC | Age: 73
End: 2023-02-21
Payer: MEDICARE

## 2023-02-22 ENCOUNTER — DOCUMENTATION ONLY (OUTPATIENT)
Dept: LAB | Facility: CLINIC | Age: 73
End: 2023-02-22
Payer: MEDICARE

## 2023-02-22 DIAGNOSIS — Z12.5 SPECIAL SCREENING FOR MALIGNANT NEOPLASM OF PROSTATE: ICD-10-CM

## 2023-02-22 DIAGNOSIS — Z79.84 DIABETES MELLITUS TREATED WITH ORAL MEDICATION (H): Primary | ICD-10-CM

## 2023-02-22 DIAGNOSIS — E11.9 DIABETES MELLITUS TREATED WITH ORAL MEDICATION (H): Primary | ICD-10-CM

## 2023-02-28 ENCOUNTER — LAB (OUTPATIENT)
Dept: LAB | Facility: CLINIC | Age: 73
End: 2023-02-28
Payer: MEDICARE

## 2023-02-28 DIAGNOSIS — Z12.5 SPECIAL SCREENING FOR MALIGNANT NEOPLASM OF PROSTATE: ICD-10-CM

## 2023-02-28 DIAGNOSIS — E11.9 DIABETES MELLITUS TREATED WITH ORAL MEDICATION (H): Primary | ICD-10-CM

## 2023-02-28 DIAGNOSIS — Z79.84 DIABETES MELLITUS TREATED WITH ORAL MEDICATION (H): Primary | ICD-10-CM

## 2023-02-28 LAB
HBA1C MFR BLD: 8.2 % (ref 0–5.6)
PSA SERPL-MCNC: 3.04 NG/ML (ref 0–6.5)

## 2023-02-28 PROCEDURE — G0103 PSA SCREENING: HCPCS

## 2023-02-28 PROCEDURE — 83036 HEMOGLOBIN GLYCOSYLATED A1C: CPT

## 2023-02-28 PROCEDURE — 36415 COLL VENOUS BLD VENIPUNCTURE: CPT

## 2023-03-06 ENCOUNTER — OFFICE VISIT (OUTPATIENT)
Dept: FAMILY MEDICINE | Facility: CLINIC | Age: 73
End: 2023-03-06
Payer: MEDICARE

## 2023-03-06 VITALS
BODY MASS INDEX: 33.35 KG/M2 | HEIGHT: 71 IN | TEMPERATURE: 97.9 F | OXYGEN SATURATION: 96 % | WEIGHT: 238.2 LBS | RESPIRATION RATE: 16 BRPM | HEART RATE: 65 BPM | DIASTOLIC BLOOD PRESSURE: 63 MMHG | SYSTOLIC BLOOD PRESSURE: 129 MMHG

## 2023-03-06 DIAGNOSIS — Z79.4 TYPE 2 DIABETES MELLITUS WITHOUT COMPLICATION, WITH LONG-TERM CURRENT USE OF INSULIN (H): Primary | ICD-10-CM

## 2023-03-06 DIAGNOSIS — E11.9 TYPE 2 DIABETES MELLITUS WITHOUT COMPLICATION, WITH LONG-TERM CURRENT USE OF INSULIN (H): Primary | ICD-10-CM

## 2023-03-06 DIAGNOSIS — M25.511 RIGHT SHOULDER PAIN, UNSPECIFIED CHRONICITY: ICD-10-CM

## 2023-03-06 DIAGNOSIS — I25.10 CORONARY ARTERY DISEASE INVOLVING NATIVE HEART, UNSPECIFIED VESSEL OR LESION TYPE, UNSPECIFIED WHETHER ANGINA PRESENT: Chronic | ICD-10-CM

## 2023-03-06 PROCEDURE — 99214 OFFICE O/P EST MOD 30 MIN: CPT | Performed by: FAMILY MEDICINE

## 2023-03-06 RX ORDER — INSULIN GLARGINE 100 [IU]/ML
34 INJECTION, SOLUTION SUBCUTANEOUS DAILY
Qty: 32 ML | Refills: 1 | Status: ON HOLD | OUTPATIENT
Start: 2023-03-06 | End: 2023-07-21

## 2023-03-06 RX ORDER — DULAGLUTIDE 3 MG/.5ML
3 INJECTION, SOLUTION SUBCUTANEOUS
Qty: 30 ML | Refills: 1 | Status: SHIPPED | OUTPATIENT
Start: 2023-03-06 | End: 2023-05-01

## 2023-03-06 NOTE — PROGRESS NOTES
"  Assessment & Plan     Type 2 diabetes mellitus without complication, with long-term current use of insulin (H)  Hemoglobin A1c is elevated.  Increase insulin glargine to 34 units.  Continue to monitor diet.  He reports she has not been as good about his diet and activity over the last month.  - TRULICITY 3 MG/0.5ML SOPN; Inject 3 mg Subcutaneous every 7 days  - insulin glargine (BASAGLAR KWIKPEN) 100 UNIT/ML pen; Inject 34 Units Subcutaneous daily    Coronary artery disease involving native heart, unspecified vessel or lesion type, unspecified whether angina present  Stable, no recent history of angina    Right shoulder pain, unspecified chronicity  Right shoulder pain suspect capsulitis versus subacromial bursitis, good strength, positive impingement  - Physical Therapy Referral; Future             BMI:   Estimated body mass index is 33.46 kg/m  as calculated from the following:    Height as of this encounter: 1.797 m (5' 10.75\").    Weight as of this encounter: 108 kg (238 lb 3.2 oz).           No follow-ups on file.    Robin Colby MD  [facility]    Christopher Killian is a 72 year old accompanied by his self, presenting for the following health issues:  Diabetes (DM follow up) and Musculoskeletal Problem (Right shoulder pain )      History of Present Illness       Diabetes:   He presents for follow up of diabetes.  He is checking home blood glucose a few times a month. He checks blood glucose before and after meals.  Blood glucose is never over 200 and never under 70. When his blood glucose is low, the patient is asymptomatic for confusion, blurred vision, lethargy and reports not feeling dizzy, shaky, or weak.  He has no concerns regarding his diabetes at this time.  He is having numbness in feet.         Reason for visit:  Diabetes and shoulder pain    He eats 2-3 servings of fruits and vegetables daily.He consumes 0 sweetened beverage(s) daily.He exercises with enough " "effort to increase his heart rate 30 to 60 minutes per day.  He exercises with enough effort to increase his heart rate 3 or less days per week.   He is taking medications regularly.     Patient is here for chronic disease follow up      DM     Oral agents:  metformin  Insulin: Glargine  Other injectable: Trulicity  BG checks:  140-150  Avg home glucose:  140s   HgbA1c: 8.2  HTN: Controlled  Lipids: At goal  BMP: No CKD  Smoking:  no   Eye exam:  01/16/23, no retinopathy     HTN      Current medications: Metoprolol  Medication side effects: None  Medication concerns: None  Talking medications as prescribed: Yes  Blood pressure controlled: Yes  Home monitor numbers:  no   End organ symptoms: No    IVD     Current medications: Metoprolol, simvastatin  Blood pressure: Controlled  Tobacco use:  no  Aspirin use: Yes  Statin: Yes  Angina/dyspnea: No    HLD     Medication: Simvastatin  LDL: At goal  Myalgia: No      Patient also wants to discuss right shoulder pain for the past couple of months.   He denies injury to his shoulder but does state that he did climb in/out of a boat in mid December.  The pain starts out in his upper shoulder/trapezius and radiates down his arm.  He did have surgery on his shoulder in 1978 (?).        Review of Systems   Constitutional, HEENT, cardiovascular, pulmonary, gi and gu systems are negative, except as otherwise noted.      Objective    /63 (BP Location: Right arm, Patient Position: Sitting, Cuff Size: Adult Large)   Pulse 65   Temp 97.9  F (36.6  C) (Tympanic)   Resp 16   Ht 1.797 m (5' 10.75\")   Wt 108 kg (238 lb 3.2 oz)   SpO2 96%   BMI 33.46 kg/m    Body mass index is 33.46 kg/m .  Physical Exam   Alert, oriented, no acute distress  Neck supple without adenopathy  Lungs are clear  Heart has a regular rate and rhythm  Abdomen is obese, soft, nontender  Exam of the right shoulder reveals good range of motion with pain at about 80 to 120 degrees of abduction, positive " impingement  No weakness of rotator cuff

## 2023-03-07 ENCOUNTER — TRANSFERRED RECORDS (OUTPATIENT)
Dept: HEALTH INFORMATION MANAGEMENT | Facility: CLINIC | Age: 73
End: 2023-03-07

## 2023-04-14 DIAGNOSIS — I21.9 MYOCARDIAL INFARCTION, UNSPECIFIED MI TYPE, UNSPECIFIED ARTERY (H): ICD-10-CM

## 2023-04-14 RX ORDER — METOPROLOL TARTRATE 25 MG/1
TABLET, FILM COATED ORAL
Qty: 180 TABLET | Refills: 1 | Status: ON HOLD | OUTPATIENT
Start: 2023-04-14 | End: 2023-07-22

## 2023-04-14 NOTE — TELEPHONE ENCOUNTER
Prescription approved per [facility] Refill Protocol.  Viri LOREDO RN  [facility]

## 2023-04-23 ENCOUNTER — TELEPHONE (OUTPATIENT)
Dept: FAMILY MEDICINE | Facility: CLINIC | Age: 73
End: 2023-04-23
Payer: MEDICARE

## 2023-04-23 DIAGNOSIS — E11.9 TYPE 2 DIABETES MELLITUS WITHOUT COMPLICATION, WITH LONG-TERM CURRENT USE OF INSULIN (H): ICD-10-CM

## 2023-04-23 DIAGNOSIS — Z79.4 TYPE 2 DIABETES MELLITUS WITHOUT COMPLICATION, WITH LONG-TERM CURRENT USE OF INSULIN (H): ICD-10-CM

## 2023-04-24 DIAGNOSIS — E11.9 TYPE 2 DIABETES MELLITUS WITHOUT COMPLICATION, WITH LONG-TERM CURRENT USE OF INSULIN (H): ICD-10-CM

## 2023-04-24 DIAGNOSIS — Z79.4 TYPE 2 DIABETES MELLITUS WITHOUT COMPLICATION, WITH LONG-TERM CURRENT USE OF INSULIN (H): ICD-10-CM

## 2023-04-25 RX ORDER — DULAGLUTIDE 3 MG/.5ML
INJECTION, SOLUTION SUBCUTANEOUS
Qty: 2 ML | OUTPATIENT
Start: 2023-04-25

## 2023-04-25 NOTE — TELEPHONE ENCOUNTER
Last office visit: 3/6/2023     Future Appointments 4/25/2023 - 10/22/2023    None        Last RX: 3/6/2023   #30ml     R-1  Should have refill on file.    Pending Prescriptions:                       Disp   Refills    TRULICITY 3 MG/0.5ML SOPN [Pharmacy Med N*2 mL                Sig: INJECT 3MG SUBCUTANEOUS EVERY 7 DAYS

## 2023-04-26 RX ORDER — DULAGLUTIDE 3 MG/.5ML
INJECTION, SOLUTION SUBCUTANEOUS
Qty: 2 ML | OUTPATIENT
Start: 2023-04-26

## 2023-05-01 DIAGNOSIS — E11.9 TYPE 2 DIABETES MELLITUS WITHOUT COMPLICATION, WITH LONG-TERM CURRENT USE OF INSULIN (H): ICD-10-CM

## 2023-05-01 DIAGNOSIS — Z79.4 TYPE 2 DIABETES MELLITUS WITHOUT COMPLICATION, WITH LONG-TERM CURRENT USE OF INSULIN (H): ICD-10-CM

## 2023-05-01 RX ORDER — DULAGLUTIDE 3 MG/.5ML
3 INJECTION, SOLUTION SUBCUTANEOUS
Qty: 6 ML | Refills: 1 | Status: SHIPPED | OUTPATIENT
Start: 2023-05-01 | End: 2023-10-20

## 2023-05-01 NOTE — TELEPHONE ENCOUNTER
Prescription approved per [facility] Refill Protocol.    Last Written Prescription Date: 3/6/23  Last Fill Quantity: 30,  # refills: 1   Last office visit: 3/6/2023

## 2023-05-01 NOTE — TELEPHONE ENCOUNTER
Patient calling wondering why Trulicity is being denied.    Stating he needs refills.    FRANSICO Escobedo  [facility]

## 2023-06-04 ENCOUNTER — HEALTH MAINTENANCE LETTER (OUTPATIENT)
Age: 73
End: 2023-06-04

## 2023-06-15 DIAGNOSIS — Z79.4 TYPE 2 DIABETES MELLITUS WITHOUT COMPLICATION, WITH LONG-TERM CURRENT USE OF INSULIN (H): ICD-10-CM

## 2023-06-15 DIAGNOSIS — E11.9 TYPE 2 DIABETES MELLITUS WITHOUT COMPLICATION, WITH LONG-TERM CURRENT USE OF INSULIN (H): ICD-10-CM

## 2023-06-16 RX ORDER — BLOOD SUGAR DIAGNOSTIC
STRIP MISCELLANEOUS
Qty: 100 STRIP | Refills: 3 | Status: SHIPPED | OUTPATIENT
Start: 2023-06-16

## 2023-06-16 NOTE — TELEPHONE ENCOUNTER
"    Last office visit: 3/6/2023     Future Appointments 6/16/2023 - 12/13/2023    None          Requested Prescriptions   Pending Prescriptions Disp Refills     blood glucose (ACCU-CHEK GUIDE) test strip [Pharmacy Med Name: ACCU-CHEK GUIDE TEST STRIPS 100S] 100 strip 3     Sig: TEST BLOOD GLUCOSE TWICE DAILY       Diabetic Supplies Protocol Passed - 6/15/2023  6:05 PM        Passed - Medication is active on med list        Passed - Patient is 18 years of age or older        Passed - Recent (6 mo) or future (30 days) visit within the authorizing provider's specialty     Patient had office visit in the last 6 months or has a visit in the next 30 days with authorizing provider.  See \"Patient Info\" tab in inbasket, or \"Choose Columns\" in Meds & Orders section of the refill encounter.                       "

## 2023-07-05 ENCOUNTER — OFFICE VISIT (OUTPATIENT)
Dept: FAMILY MEDICINE | Facility: CLINIC | Age: 73
End: 2023-07-05
Payer: MEDICARE

## 2023-07-05 VITALS
HEIGHT: 70 IN | TEMPERATURE: 98.5 F | SYSTOLIC BLOOD PRESSURE: 128 MMHG | HEART RATE: 63 BPM | BODY MASS INDEX: 33.31 KG/M2 | RESPIRATION RATE: 16 BRPM | DIASTOLIC BLOOD PRESSURE: 78 MMHG | OXYGEN SATURATION: 98 % | WEIGHT: 232.7 LBS

## 2023-07-05 DIAGNOSIS — Z23 NEED FOR DIPHTHERIA-TETANUS-PERTUSSIS (TDAP) VACCINE: ICD-10-CM

## 2023-07-05 DIAGNOSIS — I45.4 BUNDLE BRANCH BLOCK: ICD-10-CM

## 2023-07-05 DIAGNOSIS — I25.10 CORONARY ARTERY DISEASE INVOLVING NATIVE HEART WITHOUT ANGINA PECTORIS, UNSPECIFIED VESSEL OR LESION TYPE: ICD-10-CM

## 2023-07-05 DIAGNOSIS — I48.91 ATRIAL FIBRILLATION, UNSPECIFIED TYPE (H): Primary | ICD-10-CM

## 2023-07-05 DIAGNOSIS — E11.9 DIABETES MELLITUS TREATED WITH ORAL MEDICATION (H): ICD-10-CM

## 2023-07-05 DIAGNOSIS — Z23 NEED FOR SHINGLES VACCINE: ICD-10-CM

## 2023-07-05 DIAGNOSIS — Z79.84 DIABETES MELLITUS TREATED WITH ORAL MEDICATION (H): ICD-10-CM

## 2023-07-05 LAB
ANION GAP SERPL CALCULATED.3IONS-SCNC: 11 MMOL/L (ref 7–15)
BUN SERPL-MCNC: 14.9 MG/DL (ref 8–23)
CALCIUM SERPL-MCNC: 9.8 MG/DL (ref 8.8–10.2)
CHLORIDE SERPL-SCNC: 101 MMOL/L (ref 98–107)
CREAT SERPL-MCNC: 0.97 MG/DL (ref 0.67–1.17)
DEPRECATED HCO3 PLAS-SCNC: 26 MMOL/L (ref 22–29)
ERYTHROCYTE [DISTWIDTH] IN BLOOD BY AUTOMATED COUNT: 12.9 % (ref 10–15)
GFR SERPL CREATININE-BSD FRML MDRD: 82 ML/MIN/1.73M2
GLUCOSE SERPL-MCNC: 122 MG/DL (ref 70–99)
HBA1C MFR BLD: 8.5 % (ref 0–5.6)
HCT VFR BLD AUTO: 43.4 % (ref 40–53)
HGB BLD-MCNC: 14.6 G/DL (ref 13.3–17.7)
MCH RBC QN AUTO: 30.6 PG (ref 26.5–33)
MCHC RBC AUTO-ENTMCNC: 33.6 G/DL (ref 31.5–36.5)
MCV RBC AUTO: 91 FL (ref 78–100)
PLATELET # BLD AUTO: 163 10E3/UL (ref 150–450)
POTASSIUM SERPL-SCNC: 4.6 MMOL/L (ref 3.4–5.3)
RBC # BLD AUTO: 4.77 10E6/UL (ref 4.4–5.9)
SODIUM SERPL-SCNC: 138 MMOL/L (ref 136–145)
WBC # BLD AUTO: 7.5 10E3/UL (ref 4–11)

## 2023-07-05 PROCEDURE — 99214 OFFICE O/P EST MOD 30 MIN: CPT

## 2023-07-05 PROCEDURE — 80048 BASIC METABOLIC PNL TOTAL CA: CPT

## 2023-07-05 PROCEDURE — 93000 ELECTROCARDIOGRAM COMPLETE: CPT

## 2023-07-05 PROCEDURE — 36415 COLL VENOUS BLD VENIPUNCTURE: CPT

## 2023-07-05 PROCEDURE — 85027 COMPLETE CBC AUTOMATED: CPT

## 2023-07-05 PROCEDURE — 83036 HEMOGLOBIN GLYCOSYLATED A1C: CPT
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
## 2023-07-17 ENCOUNTER — PATIENT OUTREACH (OUTPATIENT)
Dept: CARE COORDINATION | Facility: CLINIC | Age: 73
End: 2023-07-17
Payer: MEDICARE

## 2023-07-18 LAB
ABO/RH(D): NORMAL
ANTIBODY SCREEN: NEGATIVE
SPECIMEN EXPIRATION DATE: NORMAL

## 2023-07-19 ENCOUNTER — PREP FOR PROCEDURE (OUTPATIENT)
Dept: CARDIOLOGY | Facility: CLINIC | Age: 73
End: 2023-07-19

## 2023-07-19 ENCOUNTER — TELEPHONE (OUTPATIENT)
Dept: CARDIOLOGY | Facility: CLINIC | Age: 73
End: 2023-07-19

## 2023-07-19 ENCOUNTER — OFFICE VISIT (OUTPATIENT)
Dept: CARDIOLOGY | Facility: CLINIC | Age: 73
End: 2023-07-19
Payer: MEDICARE

## 2023-07-19 VITALS
SYSTOLIC BLOOD PRESSURE: 120 MMHG | HEIGHT: 71 IN | WEIGHT: 233.5 LBS | DIASTOLIC BLOOD PRESSURE: 50 MMHG | RESPIRATION RATE: 16 BRPM | BODY MASS INDEX: 32.69 KG/M2 | HEART RATE: 64 BPM

## 2023-07-19 DIAGNOSIS — I25.10 CORONARY ARTERY DISEASE INVOLVING NATIVE HEART WITHOUT ANGINA PECTORIS, UNSPECIFIED VESSEL OR LESION TYPE: Primary | Chronic | ICD-10-CM

## 2023-07-19 DIAGNOSIS — I25.10 CORONARY ARTERY DISEASE INVOLVING NATIVE HEART WITHOUT ANGINA PECTORIS, UNSPECIFIED VESSEL OR LESION TYPE: ICD-10-CM

## 2023-07-19 DIAGNOSIS — R07.9 CHEST PAIN: ICD-10-CM

## 2023-07-19 DIAGNOSIS — R06.09 DOE (DYSPNEA ON EXERTION): ICD-10-CM

## 2023-07-19 DIAGNOSIS — I48.91 ATRIAL FIBRILLATION, UNSPECIFIED TYPE (H): ICD-10-CM

## 2023-07-19 DIAGNOSIS — I45.4 BUNDLE BRANCH BLOCK: ICD-10-CM

## 2023-07-19 DIAGNOSIS — R07.9 CHEST PAIN: Primary | ICD-10-CM

## 2023-07-19 LAB
ANION GAP SERPL CALCULATED.3IONS-SCNC: 9 MMOL/L (ref 5–18)
BUN SERPL-MCNC: 13 MG/DL (ref 8–28)
CALCIUM SERPL-MCNC: 9.6 MG/DL (ref 8.5–10.5)
CHLORIDE BLD-SCNC: 103 MMOL/L (ref 98–107)
CHOLEST SERPL-MCNC: 128 MG/DL
CO2 SERPL-SCNC: 29 MMOL/L (ref 22–31)
CREAT SERPL-MCNC: 0.91 MG/DL (ref 0.7–1.3)
ERYTHROCYTE [DISTWIDTH] IN BLOOD BY AUTOMATED COUNT: 12.7 % (ref 10–15)
GFR SERPL CREATININE-BSD FRML MDRD: 89 ML/MIN/1.73M2
GLUCOSE BLD-MCNC: 244 MG/DL (ref 70–125)
HCT VFR BLD AUTO: 42.2 % (ref 40–53)
HDLC SERPL-MCNC: 39 MG/DL
HGB BLD-MCNC: 14.1 G/DL (ref 13.3–17.7)
LDLC SERPL CALC-MCNC: 47 MG/DL
MCH RBC QN AUTO: 30.6 PG (ref 26.5–33)
MCHC RBC AUTO-ENTMCNC: 33.4 G/DL (ref 31.5–36.5)
MCV RBC AUTO: 92 FL (ref 78–100)
PLATELET # BLD AUTO: 166 10E3/UL (ref 150–450)
POTASSIUM BLD-SCNC: 4.3 MMOL/L (ref 3.5–5)
RBC # BLD AUTO: 4.61 10E6/UL (ref 4.4–5.9)
SODIUM SERPL-SCNC: 141 MMOL/L (ref 136–145)
TRIGL SERPL-MCNC: 209 MG/DL
WBC # BLD AUTO: 7 10E3/UL (ref 4–11)

## 2023-07-19 PROCEDURE — 85027 COMPLETE CBC AUTOMATED: CPT | Performed by: INTERNAL MEDICINE

## 2023-07-19 PROCEDURE — 80061 LIPID PANEL: CPT | Performed by: INTERNAL MEDICINE

## 2023-07-19 PROCEDURE — 86850 RBC ANTIBODY SCREEN: CPT | Performed by: INTERNAL MEDICINE

## 2023-07-19 PROCEDURE — 36415 COLL VENOUS BLD VENIPUNCTURE: CPT | Performed by: INTERNAL MEDICINE

## 2023-07-19 PROCEDURE — 99205 OFFICE O/P NEW HI 60 MIN: CPT | Performed by: INTERNAL MEDICINE

## 2023-07-19 PROCEDURE — 80048 BASIC METABOLIC PNL TOTAL CA: CPT | Performed by: INTERNAL MEDICINE

## 2023-07-19 PROCEDURE — 86901 BLOOD TYPING SEROLOGIC RH(D): CPT | Performed by: INTERNAL MEDICINE

## 2023-07-19 PROCEDURE — 86900 BLOOD TYPING SEROLOGIC ABO: CPT | Performed by: INTERNAL MEDICINE

## 2023-07-19 RX ORDER — NICOTINE POLACRILEX 4 MG
15-30 LOZENGE BUCCAL
Status: CANCELLED | OUTPATIENT
Start: 2023-07-21

## 2023-07-19 RX ORDER — DEXTROSE MONOHYDRATE 25 G/50ML
25-50 INJECTION, SOLUTION INTRAVENOUS
Status: CANCELLED | OUTPATIENT
Start: 2023-07-21

## 2023-07-19 RX ORDER — ASPIRIN 81 MG/1
243 TABLET, CHEWABLE ORAL ONCE
Status: CANCELLED | OUTPATIENT
Start: 2023-07-21

## 2023-07-19 RX ORDER — SODIUM CHLORIDE 9 MG/ML
INJECTION, SOLUTION INTRAVENOUS CONTINUOUS
Status: CANCELLED | OUTPATIENT
Start: 2023-07-21

## 2023-07-19 RX ORDER — LIDOCAINE 40 MG/G
CREAM TOPICAL
Status: CANCELLED | OUTPATIENT
Start: 2023-07-19

## 2023-07-19 RX ORDER — ASPIRIN 325 MG
325 TABLET ORAL ONCE
Status: CANCELLED | OUTPATIENT
Start: 2023-07-21 | End: 2023-07-19

## 2023-07-19 RX ORDER — FENTANYL CITRATE 50 UG/ML
25 INJECTION, SOLUTION INTRAMUSCULAR; INTRAVENOUS
Status: CANCELLED | OUTPATIENT
Start: 2023-07-21

## 2023-07-19 NOTE — LETTER
7/19/2023    Robin Colby MD  [address] [facility] [address]    RE: Constantin Pa       Dear Colleague,     I had the pleasure of seeing Constantin Pa in the [facility].    HEART CARE ENCOUNTER CONSULTATON NOTE      [facility]  [phone]      Assessment/Recommendations   Assessment:   1. Progressive angina past 4-5 weeks associated with dyspnea and nausea with mild levels of exertion.  2. Coronary Artery disease. 2004.  Proximal LAD stent (3.0 x13 mm Cypher stent, [facility])  3. Sinus rhythm with PAC (blocking sinus node) resulting is bradycardia.   4. First degree AVB   5. Chest pain   6. Coronary Artery disease. 2004.  Proximal LAD stent (3.0 x13 mm Cypher stent, [facility])  7. DM type II    Plan:   1.  Recommend coronary angiogram poss percutaneous intervention this week  2.  Recommended echocardiogram  3.  Aspirin 81 mg daily   4. Continue metoprolol  5.  Continue simvastatin at this time, would recommend switching to atorvastatin long-term  6.  No A-fib documented on EKG.  Personally reviewed.  Computer misinterpreted rhythm     History of Present Illness/Subjective    HPI: Constantin Pa is a 72 year old male coronary disease, hypertension, hyperlipidemia presents to cardiology clinic in consultation for progressive angina.    Patient was doing well this spring while in [place].  He denied any significant dyspnea or shortness of breath.  This summer he is recently developed significant dyspnea, chest pressure and nausea with exertion such as walking stairs or prolonged walking.  He feels severe fatigue by the end of the day.    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  With this he developed severe nausea resulting in vomiting.  After about 5 to 10 minutes of rest his symptoms improved gradually.    Recently evaluated by his primary care provider.  Underwent EKG which  "demonstrated sinus rhythm with first-degree AV block, right bundle branch block and blocked PACs.  Computer termination was A-fib which was not accurate       Physical Examination  Review of Systems   Vitals: /50 (BP Location: Left arm, Patient Position: Sitting, Cuff Size: Adult Large)   Pulse 64   Resp 16   Ht 1.803 m (5' 11\")   Wt 105.9 kg (233 lb 8 oz)   BMI 32.57 kg/m    BMI= Body mass index is 32.57 kg/m .  Wt Readings from Last 3 Encounters:   07/19/23 105.9 kg (233 lb 8 oz)   07/05/23 105.6 kg (232 lb 11.2 oz)   03/06/23 108 kg (238 lb 3.2 oz)        Pleasant   ENT/Mouth: membranes moist, no oral lesions or bleeding gums.      EYES:  no scleral icterus, normal conjunctivae       Chest/Lungs:   lungs are clear to auscultation, no rales or wheezing, no sternal scar, equal chest wall expansion    Cardiovascular:    Bradycardic, ectopic beats. Normal first and second heart sounds with faint systolic murmurs, rubs, or gallops; the carotid, radial and posterior tibial pulses are intact, Jugular venous pressure normal, no pitting edema bilaterally    Abdomen:  no tenderness; bowel sounds are present   Extremities: no cyanosis or clubbing   Skin: no xanthelasma, warm.    Neurologic: normal  bilateral, no tremors     Psychiatric: alert and oriented x3, calm        Please refer above for cardiac ROS details.        Medical History  Surgical History Family History Social History   Past Medical History:   Diagnosis Date    CAD (coronary artery disease)      Past Surgical History:   Procedure Laterality Date    ANGIOPLASTY  12/2004    APPENDECTOMY  01/15/2016    ARTHROPLASTY KNEE  09/16/2014    ARTHROPLASTY KNEE  12/20/2013    COLONOSCOPY  03/23/2021    Tubular adenoma x3 size 3-11mm; repeat in 3 yrs    COLONOSCOPY  01/22/2018    COLONOSCOPY  09/19/2016    COLONOSCOPY  12/09/2015    COLONOSCOPY  07/2009    COLONOSCOPY  02/03/2006    CORONARY STENT PLACEMENT N/A x1    10 years ago    FLEXIBLE SIGMOIDOSCOPY  " 12/16/2005    JOINT REPLACEMENT Right     knee    LITHOTRIPSY  1996    OTHER SURGICAL HISTORY      lithotrypsy    Shoulder Class 4 Separation  1978    SHOULDER SURGERY      STRESS TEST  02/15/2007    STRESS TEST  07/18/2013    ECG treadmill    TONSILLECTOMY & ADENOIDECTOMY  1954     Family History   Adopted: Yes        Social History     Socioeconomic History    Marital status:      Spouse name: Not on file    Number of children: Not on file    Years of education: Not on file    Highest education level: Not on file   Occupational History    Not on file   Tobacco Use    Smoking status: Former     Passive exposure: Past    Smokeless tobacco: Never   Vaping Use    Vaping Use: Never used   Substance and Sexual Activity    Alcohol use: Yes     Alcohol/week: 0.0 standard drinks of alcohol    Drug use: Not on file    Sexual activity: Not on file   Other Topics Concern    Not on file   Social History Narrative    Not on file     Social Determinants of Health     Financial Resource Strain: Not on file   Food Insecurity: Not on file   Transportation Needs: Not on file   Physical Activity: Not on file   Stress: Not on file   Social Connections: Not on file   Intimate Partner Violence: Not on file   Housing Stability: Not on file           Medications  Allergies   Current Outpatient Medications   Medication Sig Dispense Refill    aspirin 81 MG EC tablet [ASPIRIN 81 MG EC TABLET] Take 1 tablet (81 mg total) by mouth daily.      blood glucose (ACCU-CHEK GUIDE) test strip TEST BLOOD GLUCOSE TWICE DAILY 100 strip 3    blood glucose (NO BRAND SPECIFIED) test strip 1 strip by In Vitro route 2 times daily Use to test blood sugar 2 times daily or as directed. 100 strip 3    insulin glargine (BASAGLAR KWIKPEN) 100 UNIT/ML pen Inject 34 Units Subcutaneous daily (Patient taking differently: Inject 31 Units Subcutaneous daily) 32 mL 1    insulin pen needle (BD PEN NEEDLE LIVAN 2ND GEN) 32G X 4 MM miscellaneous USE WITH BASAGLAR ONCE  DAILY AS NEEDED 100 each 3    metFORMIN (GLUCOPHAGE) 1000 MG tablet Take 1 tablet (1,000 mg) by mouth 2 times daily (with meals) 180 tablet 3    metoprolol tartrate (LOPRESSOR) 25 MG tablet TAKE 1 TABLET(25 MG) BY MOUTH TWICE DAILY 180 tablet 1    simvastatin (ZOCOR) 40 MG tablet Take 1 tablet (40 mg) by mouth At Bedtime 90 tablet 3    TRULICITY 3 MG/0.5ML SOPN Inject 3 mg Subcutaneous every 7 days 6 mL 1     No Known Allergies       Lab Results    Chemistry/lipid CBC Cardiac Enzymes/BNP/TSH/INR   Recent Labs   Lab Test 07/26/22  0854 12/16/20  0920   CHOL 153 151   HDL 49 48   LDL 74 76   TRIG 149 171*   CHOLHDLRATIO  --  3.1     Recent Labs   Lab Test 07/26/22  0854 12/16/20  0920 11/19/19  0756   LDL 74 76 72     Recent Labs   Lab Test 07/05/23  0946      POTASSIUM 4.6   CHLORIDE 101   CO2 26   *   BUN 14.9   CR 0.97   GFRESTIMATED 82   YOMAIRA 9.8     Recent Labs   Lab Test 07/05/23  0946 03/28/22  0902 07/21/21  1228   CR 0.97 0.88 1.03     Recent Labs   Lab Test 07/05/23  0946 02/28/23  0755 07/26/22  0854   A1C 8.5* 8.2* 8.0*          Recent Labs   Lab Test 07/05/23  0946   WBC 7.5   HGB 14.6   HCT 43.4   MCV 91        Recent Labs   Lab Test 07/05/23  0946 03/28/22  0902   HGB 14.6 14.7    No results for input(s): TROPONINI in the last 56010 hours.  No results for input(s): BNP, NTBNPI, NTBNP in the last 10846 hours.  No results for input(s): TSH in the last 45906 hours.  No results for input(s): INR in the last 62616 hours.     Lenny Aj DO    Today's clinic visit entailed:  63 minutes spent by me on the date of the encounter doing chart review, history and exam, documentation and further activities per the note.     Thank you for allowing me to participate in the care of your patient.      Sincerely,     Lenny Aj DO   [facility]  cc:   MELODY Zhang CNP  [address] [place] [address]

## 2023-07-19 NOTE — H&P (VIEW-ONLY)
HEART CARE ENCOUNTER CONSULTATON NOTE      [facility]  [phone]      Assessment/Recommendations   Assessment:   1. Progressive angina past 4-5 weeks associated with dyspnea and nausea with mild levels of exertion.  2. Coronary Artery disease. 2004.  Proximal LAD stent (3.0 x13 mm Cypher stent, [facility])  3. Sinus rhythm with PAC (blocking sinus node) resulting is bradycardia.   4. First degree AVB   5. Chest pain   6. Coronary Artery disease. 2004.  Proximal LAD stent (3.0 x13 mm Cypher stent, [facility])  7. DM type II    Plan:   1.  Recommend coronary angiogram poss percutaneous intervention this week  2.  Recommended echocardiogram  3.  Aspirin 81 mg daily   4. Continue metoprolol  5.  Continue simvastatin at this time, would recommend switching to atorvastatin long-term  6.  No A-fib documented on EKG.  Personally reviewed.  Computer misinterpreted rhythm     History of Present Illness/Subjective    HPI: Constantin Pa is a 72 year old male coronary disease, hypertension, hyperlipidemia presents to cardiology clinic in consultation for progressive angina.    Patient was doing well this spring while in [place].  He denied any significant dyspnea or shortness of breath.  This summer he is recently developed significant dyspnea, chest pressure and nausea with exertion such as walking stairs or prolonged walking.  He feels severe fatigue by the end of the day.    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  With this he developed severe nausea resulting in vomiting.  After about 5 to 10 minutes of rest his symptoms improved gradually.    Recently evaluated by his primary care provider.  Underwent EKG which demonstrated sinus rhythm with first-degree AV block, right bundle branch block and blocked PACs.  Computer termination was A-fib which was not accurate       Physical Examination  Review of Systems  "  Vitals: /50 (BP Location: Left arm, Patient Position: Sitting, Cuff Size: Adult Large)   Pulse 64   Resp 16   Ht 1.803 m (5' 11\")   Wt 105.9 kg (233 lb 8 oz)   BMI 32.57 kg/m    BMI= Body mass index is 32.57 kg/m .  Wt Readings from Last 3 Encounters:   07/19/23 105.9 kg (233 lb 8 oz)   07/05/23 105.6 kg (232 lb 11.2 oz)   03/06/23 108 kg (238 lb 3.2 oz)        Pleasant   ENT/Mouth: membranes moist, no oral lesions or bleeding gums.      EYES:  no scleral icterus, normal conjunctivae       Chest/Lungs:   lungs are clear to auscultation, no rales or wheezing, no sternal scar, equal chest wall expansion    Cardiovascular:    Bradycardic, ectopic beats. Normal first and second heart sounds with faint systolic murmurs, rubs, or gallops; the carotid, radial and posterior tibial pulses are intact, Jugular venous pressure normal, no pitting edema bilaterally    Abdomen:  no tenderness; bowel sounds are present   Extremities: no cyanosis or clubbing   Skin: no xanthelasma, warm.    Neurologic: normal  bilateral, no tremors     Psychiatric: alert and oriented x3, calm        Please refer above for cardiac ROS details.        Medical History  Surgical History Family History Social History   Past Medical History:   Diagnosis Date     CAD (coronary artery disease)      Past Surgical History:   Procedure Laterality Date     ANGIOPLASTY  12/2004     APPENDECTOMY  01/15/2016     ARTHROPLASTY KNEE  09/16/2014     ARTHROPLASTY KNEE  12/20/2013     COLONOSCOPY  03/23/2021    Tubular adenoma x3 size 3-11mm; repeat in 3 yrs     COLONOSCOPY  01/22/2018     COLONOSCOPY  09/19/2016     COLONOSCOPY  12/09/2015     COLONOSCOPY  07/2009     COLONOSCOPY  02/03/2006     CORONARY STENT PLACEMENT N/A x1    10 years ago     FLEXIBLE SIGMOIDOSCOPY  12/16/2005     JOINT REPLACEMENT Right     knee     LITHOTRIPSY  1996     OTHER SURGICAL HISTORY      lithotrypsy     Shoulder Class 4 Separation  1978     SHOULDER SURGERY       STRESS " TEST  02/15/2007     STRESS TEST  07/18/2013    ECG treadmill     TONSILLECTOMY & ADENOIDECTOMY  1954     Family History   Adopted: Yes        Social History     Socioeconomic History     Marital status:      Spouse name: Not on file     Number of children: Not on file     Years of education: Not on file     Highest education level: Not on file   Occupational History     Not on file   Tobacco Use     Smoking status: Former     Passive exposure: Past     Smokeless tobacco: Never   Vaping Use     Vaping Use: Never used   Substance and Sexual Activity     Alcohol use: Yes     Alcohol/week: 0.0 standard drinks of alcohol     Drug use: Not on file     Sexual activity: Not on file   Other Topics Concern     Not on file   Social History Narrative     Not on file     Social Determinants of Health     Financial Resource Strain: Not on file   Food Insecurity: Not on file   Transportation Needs: Not on file   Physical Activity: Not on file   Stress: Not on file   Social Connections: Not on file   Intimate Partner Violence: Not on file   Housing Stability: Not on file           Medications  Allergies   Current Outpatient Medications   Medication Sig Dispense Refill     aspirin 81 MG EC tablet [ASPIRIN 81 MG EC TABLET] Take 1 tablet (81 mg total) by mouth daily.       blood glucose (ACCU-CHEK GUIDE) test strip TEST BLOOD GLUCOSE TWICE DAILY 100 strip 3     blood glucose (NO BRAND SPECIFIED) test strip 1 strip by In Vitro route 2 times daily Use to test blood sugar 2 times daily or as directed. 100 strip 3     insulin glargine (BASAGLAR KWIKPEN) 100 UNIT/ML pen Inject 34 Units Subcutaneous daily (Patient taking differently: Inject 31 Units Subcutaneous daily) 32 mL 1     insulin pen needle (BD PEN NEEDLE LIVAN 2ND GEN) 32G X 4 MM miscellaneous USE WITH BASAGLAR ONCE DAILY AS NEEDED 100 each 3     metFORMIN (GLUCOPHAGE) 1000 MG tablet Take 1 tablet (1,000 mg) by mouth 2 times daily (with meals) 180 tablet 3     metoprolol  tartrate (LOPRESSOR) 25 MG tablet TAKE 1 TABLET(25 MG) BY MOUTH TWICE DAILY 180 tablet 1     simvastatin (ZOCOR) 40 MG tablet Take 1 tablet (40 mg) by mouth At Bedtime 90 tablet 3     TRULICITY 3 MG/0.5ML SOPN Inject 3 mg Subcutaneous every 7 days 6 mL 1     No Known Allergies       Lab Results    Chemistry/lipid CBC Cardiac Enzymes/BNP/TSH/INR   Recent Labs   Lab Test 07/26/22  0854 12/16/20  0920   CHOL 153 151   HDL 49 48   LDL 74 76   TRIG 149 171*   CHOLHDLRATIO  --  3.1     Recent Labs   Lab Test 07/26/22  0854 12/16/20  0920 11/19/19  0756   LDL 74 76 72     Recent Labs   Lab Test 07/05/23  0946      POTASSIUM 4.6   CHLORIDE 101   CO2 26   *   BUN 14.9   CR 0.97   GFRESTIMATED 82   YOMAIRA 9.8     Recent Labs   Lab Test 07/05/23  0946 03/28/22  0902 07/21/21  1228   CR 0.97 0.88 1.03     Recent Labs   Lab Test 07/05/23  0946 02/28/23  0755 07/26/22  0854   A1C 8.5* 8.2* 8.0*          Recent Labs   Lab Test 07/05/23  0946   WBC 7.5   HGB 14.6   HCT 43.4   MCV 91        Recent Labs   Lab Test 07/05/23  0946 03/28/22  0902   HGB 14.6 14.7    No results for input(s): TROPONINI in the last 18759 hours.  No results for input(s): BNP, NTBNPI, NTBNP in the last 89031 hours.  No results for input(s): TSH in the last 03233 hours.  No results for input(s): INR in the last 26926 hours.     Lenny Aj DO    Today's clinic visit entailed:  63 minutes spent by me on the date of the encounter doing chart review, history and exam, documentation and further activities per the note.

## 2023-07-19 NOTE — PROGRESS NOTES
HEART CARE ENCOUNTER CONSULTATON NOTE      [facility]  [phone]      Assessment/Recommendations   Assessment:   1. Progressive angina past 4-5 weeks associated with dyspnea and nausea with mild levels of exertion.  2. Coronary Artery disease. 2004.  Proximal LAD stent (3.0 x13 mm Cypher stent, [facility])  3. Sinus rhythm with PAC (blocking sinus node) resulting is bradycardia.   4. First degree AVB   5. Chest pain   6. Coronary Artery disease. 2004.  Proximal LAD stent (3.0 x13 mm Cypher stent, [facility])  7. DM type II    Plan:   1.  Recommend coronary angiogram poss percutaneous intervention this week  2.  Recommended echocardiogram  3.  Aspirin 81 mg daily   4. Continue metoprolol  5.  Continue simvastatin at this time, would recommend switching to atorvastatin long-term  6.  No A-fib documented on EKG.  Personally reviewed.  Computer misinterpreted rhythm     History of Present Illness/Subjective    HPI: Constantin Pa is a 72 year old male coronary disease, hypertension, hyperlipidemia presents to cardiology clinic in consultation for progressive angina.    Patient was doing well this spring while in [place].  He denied any significant dyspnea or shortness of breath.  This summer he is recently developed significant dyspnea, chest pressure and nausea with exertion such as walking stairs or prolonged walking.  He feels severe fatigue by the end of the day.    While in [place] he was noticing significant dyspnea and chest pressure while ambulating stairs from his stock.  With this he developed severe nausea resulting in vomiting.  After about 5 to 10 minutes of rest his symptoms improved gradually.    Recently evaluated by his primary care provider.  Underwent EKG which demonstrated sinus rhythm with first-degree AV block, right bundle branch block and blocked PACs.  Computer termination was A-fib which was not accurate       Physical Examination  Review of Systems  "  Vitals: /50 (BP Location: Left arm, Patient Position: Sitting, Cuff Size: Adult Large)   Pulse 64   Resp 16   Ht 1.803 m (5' 11\")   Wt 105.9 kg (233 lb 8 oz)   BMI 32.57 kg/m    BMI= Body mass index is 32.57 kg/m .  Wt Readings from Last 3 Encounters:   07/19/23 105.9 kg (233 lb 8 oz)   07/05/23 105.6 kg (232 lb 11.2 oz)   03/06/23 108 kg (238 lb 3.2 oz)        Pleasant   ENT/Mouth: membranes moist, no oral lesions or bleeding gums.      EYES:  no scleral icterus, normal conjunctivae       Chest/Lungs:   lungs are clear to auscultation, no rales or wheezing, no sternal scar, equal chest wall expansion    Cardiovascular:    Bradycardic, ectopic beats. Normal first and second heart sounds with faint systolic murmurs, rubs, or gallops; the carotid, radial and posterior tibial pulses are intact, Jugular venous pressure normal, no pitting edema bilaterally    Abdomen:  no tenderness; bowel sounds are present   Extremities: no cyanosis or clubbing   Skin: no xanthelasma, warm.    Neurologic: normal  bilateral, no tremors     Psychiatric: alert and oriented x3, calm        Please refer above for cardiac ROS details.        Medical History  Surgical History Family History Social History   Past Medical History:   Diagnosis Date     CAD (coronary artery disease)      Past Surgical History:   Procedure Laterality Date     ANGIOPLASTY  12/2004     APPENDECTOMY  01/15/2016     ARTHROPLASTY KNEE  09/16/2014     ARTHROPLASTY KNEE  12/20/2013     COLONOSCOPY  03/23/2021    Tubular adenoma x3 size 3-11mm; repeat in 3 yrs     COLONOSCOPY  01/22/2018     COLONOSCOPY  09/19/2016     COLONOSCOPY  12/09/2015     COLONOSCOPY  07/2009     COLONOSCOPY  02/03/2006     CORONARY STENT PLACEMENT N/A x1    10 years ago     FLEXIBLE SIGMOIDOSCOPY  12/16/2005     JOINT REPLACEMENT Right     knee     LITHOTRIPSY  1996     OTHER SURGICAL HISTORY      lithotrypsy     Shoulder Class 4 Separation  1978     SHOULDER SURGERY       STRESS " TEST  02/15/2007     STRESS TEST  07/18/2013    ECG treadmill     TONSILLECTOMY & ADENOIDECTOMY  1954     Family History   Adopted: Yes        Social History     Socioeconomic History     Marital status:      Spouse name: Not on file     Number of children: Not on file     Years of education: Not on file     Highest education level: Not on file   Occupational History     Not on file   Tobacco Use     Smoking status: Former     Passive exposure: Past     Smokeless tobacco: Never   Vaping Use     Vaping Use: Never used   Substance and Sexual Activity     Alcohol use: Yes     Alcohol/week: 0.0 standard drinks of alcohol     Drug use: Not on file     Sexual activity: Not on file   Other Topics Concern     Not on file   Social History Narrative     Not on file     Social Determinants of Health     Financial Resource Strain: Not on file   Food Insecurity: Not on file   Transportation Needs: Not on file   Physical Activity: Not on file   Stress: Not on file   Social Connections: Not on file   Intimate Partner Violence: Not on file   Housing Stability: Not on file           Medications  Allergies   Current Outpatient Medications   Medication Sig Dispense Refill     aspirin 81 MG EC tablet [ASPIRIN 81 MG EC TABLET] Take 1 tablet (81 mg total) by mouth daily.       blood glucose (ACCU-CHEK GUIDE) test strip TEST BLOOD GLUCOSE TWICE DAILY 100 strip 3     blood glucose (NO BRAND SPECIFIED) test strip 1 strip by In Vitro route 2 times daily Use to test blood sugar 2 times daily or as directed. 100 strip 3     insulin glargine (BASAGLAR KWIKPEN) 100 UNIT/ML pen Inject 34 Units Subcutaneous daily (Patient taking differently: Inject 31 Units Subcutaneous daily) 32 mL 1     insulin pen needle (BD PEN NEEDLE LIVAN 2ND GEN) 32G X 4 MM miscellaneous USE WITH BASAGLAR ONCE DAILY AS NEEDED 100 each 3     metFORMIN (GLUCOPHAGE) 1000 MG tablet Take 1 tablet (1,000 mg) by mouth 2 times daily (with meals) 180 tablet 3     metoprolol  tartrate (LOPRESSOR) 25 MG tablet TAKE 1 TABLET(25 MG) BY MOUTH TWICE DAILY 180 tablet 1     simvastatin (ZOCOR) 40 MG tablet Take 1 tablet (40 mg) by mouth At Bedtime 90 tablet 3     TRULICITY 3 MG/0.5ML SOPN Inject 3 mg Subcutaneous every 7 days 6 mL 1     No Known Allergies       Lab Results    Chemistry/lipid CBC Cardiac Enzymes/BNP/TSH/INR   Recent Labs   Lab Test 07/26/22  0854 12/16/20  0920   CHOL 153 151   HDL 49 48   LDL 74 76   TRIG 149 171*   CHOLHDLRATIO  --  3.1     Recent Labs   Lab Test 07/26/22  0854 12/16/20  0920 11/19/19  0756   LDL 74 76 72     Recent Labs   Lab Test 07/05/23  0946      POTASSIUM 4.6   CHLORIDE 101   CO2 26   *   BUN 14.9   CR 0.97   GFRESTIMATED 82   YOMAIRA 9.8     Recent Labs   Lab Test 07/05/23  0946 03/28/22  0902 07/21/21  1228   CR 0.97 0.88 1.03     Recent Labs   Lab Test 07/05/23  0946 02/28/23  0755 07/26/22  0854   A1C 8.5* 8.2* 8.0*          Recent Labs   Lab Test 07/05/23  0946   WBC 7.5   HGB 14.6   HCT 43.4   MCV 91        Recent Labs   Lab Test 07/05/23  0946 03/28/22  0902   HGB 14.6 14.7    No results for input(s): TROPONINI in the last 34004 hours.  No results for input(s): BNP, NTBNPI, NTBNP in the last 94159 hours.  No results for input(s): TSH in the last 64077 hours.  No results for input(s): INR in the last 80905 hours.     Lenny Aj DO    Today's clinic visit entailed:  63 minutes spent by me on the date of the encounter doing chart review, history and exam, documentation and further activities per the note.

## 2023-07-19 NOTE — TELEPHONE ENCOUNTER
Discussion with MAT. Verbal order for urgent CORS poss PCI, Echo same day. Orders placed. Scheduled. Education completed in clinic with patient and wife. Labs collected. Opportunity to ask questions and have them answered. Prep letter and education materials taken home. Pre-procedure orders placed. Documentation complete. Of note, back 19 years ago when patient had Femoral approach Angio, bleeding at the site, three times post-procedure. By description site had a large amount of bleeding, traumatic.  Will add to documentation. EUGENE,Rn

## 2023-07-19 NOTE — TELEPHONE ENCOUNTER
Constantin LOREDO Count includes the [facility]  [address] [place] [address] [phone] (home) [phone] (work)    Reason: chest pain, ALARCON, CAD  Procedure cardiologist:  Dr. Tellez or Dr. Hernandez  PCP:  Robin Colby  H&P completed by:  Dr. Aj 7/19/23  Admit date 7/21/23  Arrival time:  0930  Anticoagulation: None.  CPAP: No  Previous PCI: 2004, proximal LAD stent   Bypass Grafts: No  Renal Issues: No  Diabetic?: Yes  Device?: No    Ambulatory?: Independently.   + 2004 femoral angiogram patient had (3) episodes of bleeding post-procedure.     Angiogram Teaching    Reason for Visit:  Telephone call to discuss pre-procedure education in preparation for: Coronary Angiogram with Possible Percutaneous Coronary Intervention     Procedure Prep:  Primary Cardiologist note dated: 7/19  EKG results obtained, dated: Morning of procedure  Pre-procedure labs: Hemogram, BMP and T&S results obtained: 7/19  Lipid Profile results obtained: 7/19     Pre-procedure instructions  In preparation for this procedure, we would ask that you have:  No solid food for 8 hours prior to your procedure : Midnight  No clear liquids 2 hours prior to your procedure : 0730     Patient instructed to shower the evening before or the morning of the procedure.  Leave all valuables at home (jewlery, rings, watches, large amounts of money).  Patient understands (2) visitors allowed during patients stay.   Patient instructed to arrange for transportation home following procedure from a responsible family member of friend. No driving for at least 24 hours post-procedure.  Patient instructed to have a responsible adult with them for 24 hours post-procedure.  Post-procedure follow up process.  Conscious sedation discussed.  Check yourself morning of procedure for any covid symptoms. If symptoms, call Card[facility] nurses at [phone] before leaving for the hospital.      Pre-procedure medication instructions  Patient instructed on antiplatelet  medication.  Continue medications as scheduled, with a small amount of water on the day of the procedure unless indicated.  Patient instructed to take 325 mg of Aspirin am of procedure: Yes  Other medication: Morning of procedure please HOLD all vitamins, minerals, and supplements. Please TAKE (4) baby aspirin or (1) full size 325 mg aspirin morning of procedure.  Evening prior to procedure take 1/2 usual dose of long-acting Glargine insulin= 15 units  Morning of procedure HOLD Metformin. Will be instructed when to resume post-procedure.     *PATIENTS RECORDS AVAILABLE IN [facility] UNLESS OTHERWISE INDICATED*    Patient Active Problem List   Diagnosis     Knee arthropathy     Dyslipidemia     Diverticulosis     Diabetes mellitus (H)     Coronary artery disease     Myocardial infarction (H)     Obesity     Acute blood loss anemia     Calculus of kidney     Diabetes mellitus treated with oral medication (H)     Polyp of colon     Tinnitus       Current Outpatient Medications   Medication Sig Dispense Refill     aspirin 81 MG EC tablet [ASPIRIN 81 MG EC TABLET] Take 1 tablet (81 mg total) by mouth daily.       blood glucose (ACCU-CHEK GUIDE) test strip TEST BLOOD GLUCOSE TWICE DAILY 100 strip 3     blood glucose (NO BRAND SPECIFIED) test strip 1 strip by In Vitro route 2 times daily Use to test blood sugar 2 times daily or as directed. 100 strip 3     insulin glargine (BASAGLAR KWIKPEN) 100 UNIT/ML pen Inject 34 Units Subcutaneous daily (Patient taking differently: Inject 31 Units Subcutaneous daily) 32 mL 1     insulin pen needle (BD PEN NEEDLE LIVAN 2ND GEN) 32G X 4 MM miscellaneous USE WITH BASAGLAR ONCE DAILY AS NEEDED 100 each 3     metFORMIN (GLUCOPHAGE) 1000 MG tablet Take 1 tablet (1,000 mg) by mouth 2 times daily (with meals) 180 tablet 3     metoprolol tartrate (LOPRESSOR) 25 MG tablet TAKE 1 TABLET(25 MG) BY MOUTH TWICE DAILY 180 tablet 1     simvastatin (ZOCOR) 40 MG tablet Take 1 tablet (40 mg) by mouth At  Bedtime 90 tablet 3     TRULICITY 3 MG/0.5ML SOPN Inject 3 mg Subcutaneous every 7 days 6 mL 1       No Known Allergies    Procedure order set placed: 7/19/23    Plan: Patient education completed. Pt will be accompanied with his wife Sarah. Opportunity to ask questions and have them answered. None further at this time. Patient verbalized understanding of responsible adult for 24 hours and  post-procedure at time of discharge. Patient ready for procedure.       Rao Montemayor RN

## 2023-07-21 ENCOUNTER — HOSPITAL ENCOUNTER (OUTPATIENT)
Dept: CARDIOLOGY | Facility: HOSPITAL | Age: 73
Discharge: HOME | End: 2023-07-21
Attending: INTERNAL MEDICINE | Admitting: INTERNAL MEDICINE
Payer: MEDICARE

## 2023-07-21 ENCOUNTER — HOSPITAL ENCOUNTER (OUTPATIENT)
Facility: HOSPITAL | Age: 73
Setting detail: OBSERVATION
Discharge: HOME | End: 2023-07-22
Attending: INTERNAL MEDICINE | Admitting: INTERNAL MEDICINE
Payer: MEDICARE

## 2023-07-21 ENCOUNTER — APPOINTMENT (OUTPATIENT)
Dept: CT IMAGING | Facility: HOSPITAL | Age: 73
End: 2023-07-21
Attending: HOSPITALIST
Payer: MEDICARE

## 2023-07-21 DIAGNOSIS — R06.09 DOE (DYSPNEA ON EXERTION): ICD-10-CM

## 2023-07-21 DIAGNOSIS — I25.10 CORONARY ARTERY DISEASE INVOLVING NATIVE HEART WITHOUT ANGINA PECTORIS, UNSPECIFIED VESSEL OR LESION TYPE: ICD-10-CM

## 2023-07-21 DIAGNOSIS — R07.9 CHEST PAIN: ICD-10-CM

## 2023-07-21 DIAGNOSIS — E11.9 TYPE 2 DIABETES MELLITUS WITHOUT COMPLICATION, WITH LONG-TERM CURRENT USE OF INSULIN (H): ICD-10-CM

## 2023-07-21 DIAGNOSIS — I25.10 CORONARY ARTERY DISEASE INVOLVING NATIVE HEART WITHOUT ANGINA PECTORIS, UNSPECIFIED VESSEL OR LESION TYPE: Chronic | ICD-10-CM

## 2023-07-21 DIAGNOSIS — Z98.61 PERCUTANEOUS TRANSLUMINAL CORONARY ANGIOPLASTY STATUS: Primary | ICD-10-CM

## 2023-07-21 DIAGNOSIS — Z79.4 TYPE 2 DIABETES MELLITUS WITHOUT COMPLICATION, WITH LONG-TERM CURRENT USE OF INSULIN (H): ICD-10-CM

## 2023-07-21 DIAGNOSIS — I21.4 NON-ST ELEVATION MYOCARDIAL INFARCTION (NSTEMI) (H): ICD-10-CM

## 2023-07-21 LAB
ACT BLD: 276 SECONDS (ref 74–150)
ACT BLD: 359 SECONDS (ref 74–150)
ALBUMIN UR-MCNC: NEGATIVE MG/DL
ANION GAP SERPL CALCULATED.3IONS-SCNC: 15 MMOL/L (ref 7–15)
APPEARANCE UR: CLEAR
ATRIAL RATE - MUSE: 61 BPM
ATRIAL RATE - MUSE: 65 BPM
BILIRUB UR QL STRIP: NEGATIVE
BUN SERPL-MCNC: 10.1 MG/DL (ref 8–23)
CALCIUM SERPL-MCNC: 10.4 MG/DL (ref 8.8–10.2)
CHLORIDE SERPL-SCNC: 100 MMOL/L (ref 98–107)
CHOLEST SERPL-MCNC: 138 MG/DL
COLOR UR AUTO: COLORLESS
CREAT SERPL-MCNC: 0.93 MG/DL (ref 0.67–1.17)
DEPRECATED HCO3 PLAS-SCNC: 26 MMOL/L (ref 22–29)
DIASTOLIC BLOOD PRESSURE - MUSE: NORMAL MMHG
DIASTOLIC BLOOD PRESSURE - MUSE: NORMAL MMHG
GFR SERPL CREATININE-BSD FRML MDRD: 87 ML/MIN/1.73M2
GLUCOSE BLDC GLUCOMTR-MCNC: 144 MG/DL (ref 70–99)
GLUCOSE BLDC GLUCOMTR-MCNC: 237 MG/DL (ref 70–99)
GLUCOSE SERPL-MCNC: 120 MG/DL (ref 70–99)
GLUCOSE UR STRIP-MCNC: NEGATIVE MG/DL
HDLC SERPL-MCNC: 42 MG/DL
HGB UR QL STRIP: NEGATIVE
INTERPRETATION ECG - MUSE: NORMAL
INTERPRETATION ECG - MUSE: NORMAL
KETONES UR STRIP-MCNC: NEGATIVE MG/DL
LDLC SERPL CALC-MCNC: 61 MG/DL
LEUKOCYTE ESTERASE UR QL STRIP: NEGATIVE
LVEF ECHO: NORMAL
NITRATE UR QL: NEGATIVE
NONHDLC SERPL-MCNC: 96 MG/DL
P AXIS - MUSE: -15 DEGREES
P AXIS - MUSE: 18 DEGREES
PH UR STRIP: 7.5 [PH] (ref 5–7)
POTASSIUM SERPL-SCNC: 4.2 MMOL/L (ref 3.4–5.3)
PR INTERVAL - MUSE: 306 MS
PR INTERVAL - MUSE: 336 MS
QRS DURATION - MUSE: 102 MS
QRS DURATION - MUSE: 106 MS
QT - MUSE: 404 MS
QT - MUSE: 434 MS
QTC - MUSE: 384 MS
QTC - MUSE: 420 MS
R AXIS - MUSE: -44 DEGREES
R AXIS - MUSE: -46 DEGREES
RBC URINE: 0 /HPF
SODIUM SERPL-SCNC: 141 MMOL/L (ref 136–145)
SP GR UR STRIP: 1.02 (ref 1–1.03)
SYSTOLIC BLOOD PRESSURE - MUSE: NORMAL MMHG
SYSTOLIC BLOOD PRESSURE - MUSE: NORMAL MMHG
T AXIS - MUSE: -28 DEGREES
T AXIS - MUSE: -30 DEGREES
TRIGL SERPL-MCNC: 176 MG/DL
UROBILINOGEN UR STRIP-MCNC: <2 MG/DL
VENTRICULAR RATE- MUSE: 47 BPM
VENTRICULAR RATE- MUSE: 65 BPM
WBC URINE: 0 /HPF

## 2023-07-21 PROCEDURE — 250N000013 HC RX MED GY IP 250 OP 250 PS 637: Performed by: INTERNAL MEDICINE

## 2023-07-21 PROCEDURE — 999N000054 HC STATISTIC EKG NON-CHARGEABLE

## 2023-07-21 PROCEDURE — 80061 LIPID PANEL: CPT | Performed by: INTERNAL MEDICINE

## 2023-07-21 PROCEDURE — 93454 CORONARY ARTERY ANGIO S&I: CPT | Performed by: INTERNAL MEDICINE

## 2023-07-21 PROCEDURE — 999N000208 ECHOCARDIOGRAM COMPLETE

## 2023-07-21 PROCEDURE — 250N000012 HC RX MED GY IP 250 OP 636 PS 637: Performed by: INTERNAL MEDICINE

## 2023-07-21 PROCEDURE — 99152 MOD SED SAME PHYS/QHP 5/>YRS: CPT | Performed by: INTERNAL MEDICINE

## 2023-07-21 PROCEDURE — 80048 BASIC METABOLIC PNL TOTAL CA: CPT | Performed by: HOSPITALIST

## 2023-07-21 PROCEDURE — G0378 HOSPITAL OBSERVATION PER HR: HCPCS

## 2023-07-21 PROCEDURE — 93005 ELECTROCARDIOGRAM TRACING: CPT

## 2023-07-21 PROCEDURE — 96374 THER/PROPH/DIAG INJ IV PUSH: CPT | Mod: XU

## 2023-07-21 PROCEDURE — 93799 UNLISTED CV SVC/PROCEDURE: CPT | Performed by: INTERNAL MEDICINE

## 2023-07-21 PROCEDURE — 93571 IV DOP VEL&/PRESS C FLO 1ST: CPT | Mod: 26 | Performed by: INTERNAL MEDICINE

## 2023-07-21 PROCEDURE — C1725 CATH, TRANSLUMIN NON-LASER: HCPCS | Performed by: INTERNAL MEDICINE

## 2023-07-21 PROCEDURE — 81001 URINALYSIS AUTO W/SCOPE: CPT | Performed by: HOSPITALIST

## 2023-07-21 PROCEDURE — 250N000011 HC RX IP 250 OP 636: Performed by: INTERNAL MEDICINE

## 2023-07-21 PROCEDURE — 250N000013 HC RX MED GY IP 250 OP 250 PS 637: Performed by: HOSPITALIST

## 2023-07-21 PROCEDURE — 250N000009 HC RX 250: Performed by: INTERNAL MEDICINE

## 2023-07-21 PROCEDURE — 93306 TTE W/DOPPLER COMPLETE: CPT | Mod: 26 | Performed by: INTERNAL MEDICINE

## 2023-07-21 PROCEDURE — 93005 ELECTROCARDIOGRAM TRACING: CPT | Mod: XU

## 2023-07-21 PROCEDURE — 272N000001 HC OR GENERAL SUPPLY STERILE: Performed by: INTERNAL MEDICINE

## 2023-07-21 PROCEDURE — 93010 ELECTROCARDIOGRAM REPORT: CPT | Mod: HOP | Performed by: INTERNAL MEDICINE

## 2023-07-21 PROCEDURE — 93454 CORONARY ARTERY ANGIO S&I: CPT | Mod: 26 | Performed by: INTERNAL MEDICINE

## 2023-07-21 PROCEDURE — C1887 CATHETER, GUIDING: HCPCS | Performed by: INTERNAL MEDICINE

## 2023-07-21 PROCEDURE — 250N000013 HC RX MED GY IP 250 OP 250 PS 637: Performed by: NURSE PRACTITIONER

## 2023-07-21 PROCEDURE — C1894 INTRO/SHEATH, NON-LASER: HCPCS | Performed by: INTERNAL MEDICINE

## 2023-07-21 PROCEDURE — C1769 GUIDE WIRE: HCPCS | Performed by: INTERNAL MEDICINE

## 2023-07-21 PROCEDURE — 255N000002 HC RX 255 OP 636: Performed by: INTERNAL MEDICINE

## 2023-07-21 PROCEDURE — 82962 GLUCOSE BLOOD TEST: CPT

## 2023-07-21 PROCEDURE — 85347 COAGULATION TIME ACTIVATED: CPT

## 2023-07-21 PROCEDURE — 258N000003 HC RX IP 258 OP 636: Performed by: INTERNAL MEDICINE

## 2023-07-21 PROCEDURE — 99233 SBSQ HOSP IP/OBS HIGH 50: CPT | Performed by: HOSPITALIST

## 2023-07-21 PROCEDURE — 74176 CT ABD & PELVIS W/O CONTRAST: CPT | Mod: MG

## 2023-07-21 PROCEDURE — C9600 PERC DRUG-EL COR STENT SING: HCPCS | Mod: RC | Performed by: INTERNAL MEDICINE

## 2023-07-21 PROCEDURE — C1874 STENT, COATED/COV W/DEL SYS: HCPCS | Performed by: INTERNAL MEDICINE

## 2023-07-21 PROCEDURE — 99153 MOD SED SAME PHYS/QHP EA: CPT | Performed by: INTERNAL MEDICINE

## 2023-07-21 PROCEDURE — 92928 PRQ TCAT PLMT NTRAC ST 1 LES: CPT | Mod: RC | Performed by: INTERNAL MEDICINE

## 2023-07-21 PROCEDURE — 36415 COLL VENOUS BLD VENIPUNCTURE: CPT | Performed by: INTERNAL MEDICINE

## 2023-07-21 DEVICE — STENT CORONARY DES SYNERGY XD MR US 3.00X20MM H7493941820300: Type: IMPLANTABLE DEVICE | Status: FUNCTIONAL

## 2023-07-21 RX ORDER — NALOXONE HYDROCHLORIDE 0.4 MG/ML
0.2 INJECTION, SOLUTION INTRAMUSCULAR; INTRAVENOUS; SUBCUTANEOUS
Status: ACTIVE | OUTPATIENT
Start: 2023-07-21 | End: 2023-07-21

## 2023-07-21 RX ORDER — ASPIRIN 81 MG/1
81 TABLET ORAL DAILY
Status: DISCONTINUED | OUTPATIENT
Start: 2023-07-22 | End: 2023-07-21

## 2023-07-21 RX ORDER — DEXTROSE MONOHYDRATE 25 G/50ML
25-50 INJECTION, SOLUTION INTRAVENOUS
Status: DISCONTINUED | OUTPATIENT
Start: 2023-07-21 | End: 2023-07-22 | Stop reason: HOSPADM

## 2023-07-21 RX ORDER — ASPIRIN 81 MG/1
81 TABLET ORAL DAILY
Qty: 30 TABLET | Refills: 3 | Status: SHIPPED | OUTPATIENT
Start: 2023-07-22

## 2023-07-21 RX ORDER — ASPIRIN 81 MG/1
81 TABLET, CHEWABLE ORAL ONCE
Status: COMPLETED | OUTPATIENT
Start: 2023-07-21 | End: 2023-07-21

## 2023-07-21 RX ORDER — NICOTINE POLACRILEX 4 MG
15-30 LOZENGE BUCCAL
Status: DISCONTINUED | OUTPATIENT
Start: 2023-07-21 | End: 2023-07-22 | Stop reason: HOSPADM

## 2023-07-21 RX ORDER — ASPIRIN 325 MG
325 TABLET ORAL ONCE
Status: DISCONTINUED | OUTPATIENT
Start: 2023-07-21 | End: 2023-07-21 | Stop reason: HOSPADM

## 2023-07-21 RX ORDER — DIAZEPAM 5 MG
5 TABLET ORAL EVERY 6 HOURS PRN
Status: DISCONTINUED | OUTPATIENT
Start: 2023-07-21 | End: 2023-07-21

## 2023-07-21 RX ORDER — NITROGLYCERIN 0.4 MG/1
0.4 TABLET SUBLINGUAL EVERY 5 MIN PRN
Status: DISCONTINUED | OUTPATIENT
Start: 2023-07-21 | End: 2023-07-22 | Stop reason: HOSPADM

## 2023-07-21 RX ORDER — ATROPINE SULFATE 0.1 MG/ML
0.5 INJECTION INTRAVENOUS
Status: ACTIVE | OUTPATIENT
Start: 2023-07-21 | End: 2023-07-21

## 2023-07-21 RX ORDER — INSULIN GLARGINE 100 [IU]/ML
40 INJECTION, SOLUTION SUBCUTANEOUS AT BEDTIME
COMMUNITY
End: 2023-09-01

## 2023-07-21 RX ORDER — NITROGLYCERIN 5 MG/ML
VIAL (ML) INTRAVENOUS
Status: DISCONTINUED | OUTPATIENT
Start: 2023-07-21 | End: 2023-07-21 | Stop reason: HOSPADM

## 2023-07-21 RX ORDER — METOPROLOL TARTRATE 1 MG/ML
5 INJECTION, SOLUTION INTRAVENOUS
Status: DISCONTINUED | OUTPATIENT
Start: 2023-07-21 | End: 2023-07-22 | Stop reason: HOSPADM

## 2023-07-21 RX ORDER — ATORVASTATIN CALCIUM 40 MG/1
80 TABLET, FILM COATED ORAL DAILY
Status: DISCONTINUED | OUTPATIENT
Start: 2023-07-21 | End: 2023-07-21

## 2023-07-21 RX ORDER — NALOXONE HYDROCHLORIDE 0.4 MG/ML
0.4 INJECTION, SOLUTION INTRAMUSCULAR; INTRAVENOUS; SUBCUTANEOUS
Status: ACTIVE | OUTPATIENT
Start: 2023-07-21 | End: 2023-07-21

## 2023-07-21 RX ORDER — LIDOCAINE 40 MG/G
CREAM TOPICAL
Status: DISCONTINUED | OUTPATIENT
Start: 2023-07-21 | End: 2023-07-21 | Stop reason: HOSPADM

## 2023-07-21 RX ORDER — OXYCODONE HYDROCHLORIDE 5 MG/1
10 TABLET ORAL EVERY 4 HOURS PRN
Status: DISCONTINUED | OUTPATIENT
Start: 2023-07-21 | End: 2023-07-22 | Stop reason: HOSPADM

## 2023-07-21 RX ORDER — ASPIRIN 81 MG/1
243 TABLET, CHEWABLE ORAL ONCE
Status: DISCONTINUED | OUTPATIENT
Start: 2023-07-21 | End: 2023-07-21 | Stop reason: HOSPADM

## 2023-07-21 RX ORDER — DEXTROSE MONOHYDRATE 25 G/50ML
25-50 INJECTION, SOLUTION INTRAVENOUS
Status: DISCONTINUED | OUTPATIENT
Start: 2023-07-21 | End: 2023-07-21 | Stop reason: HOSPADM

## 2023-07-21 RX ORDER — ASPIRIN 81 MG/1
81 TABLET ORAL DAILY
Status: DISCONTINUED | OUTPATIENT
Start: 2023-07-22 | End: 2023-07-22 | Stop reason: HOSPADM

## 2023-07-21 RX ORDER — HYDROMORPHONE HYDROCHLORIDE 1 MG/ML
0.25 INJECTION, SOLUTION INTRAMUSCULAR; INTRAVENOUS; SUBCUTANEOUS
Status: DISCONTINUED | OUTPATIENT
Start: 2023-07-21 | End: 2023-07-22 | Stop reason: HOSPADM

## 2023-07-21 RX ORDER — ACETAMINOPHEN 325 MG/1
650 TABLET ORAL EVERY 4 HOURS PRN
Status: DISCONTINUED | OUTPATIENT
Start: 2023-07-21 | End: 2023-07-22 | Stop reason: HOSPADM

## 2023-07-21 RX ORDER — AMLODIPINE BESYLATE 2.5 MG/1
2.5 TABLET ORAL DAILY
Status: DISCONTINUED | OUTPATIENT
Start: 2023-07-21 | End: 2023-07-22 | Stop reason: HOSPADM

## 2023-07-21 RX ORDER — METOPROLOL TARTRATE 25 MG/1
25 TABLET, FILM COATED ORAL 2 TIMES DAILY
Status: DISCONTINUED | OUTPATIENT
Start: 2023-07-21 | End: 2023-07-22

## 2023-07-21 RX ORDER — NICOTINE POLACRILEX 4 MG
15-30 LOZENGE BUCCAL
Status: DISCONTINUED | OUTPATIENT
Start: 2023-07-21 | End: 2023-07-21 | Stop reason: HOSPADM

## 2023-07-21 RX ORDER — HYDRALAZINE HYDROCHLORIDE 20 MG/ML
10 INJECTION INTRAMUSCULAR; INTRAVENOUS EVERY 4 HOURS PRN
Status: DISCONTINUED | OUTPATIENT
Start: 2023-07-21 | End: 2023-07-22 | Stop reason: HOSPADM

## 2023-07-21 RX ORDER — OXYCODONE HYDROCHLORIDE 5 MG/1
5 TABLET ORAL EVERY 4 HOURS PRN
Status: DISCONTINUED | OUTPATIENT
Start: 2023-07-21 | End: 2023-07-22 | Stop reason: HOSPADM

## 2023-07-21 RX ORDER — SODIUM CHLORIDE 9 MG/ML
INJECTION, SOLUTION INTRAVENOUS CONTINUOUS
Status: ACTIVE | OUTPATIENT
Start: 2023-07-21 | End: 2023-07-21

## 2023-07-21 RX ORDER — ONDANSETRON 4 MG/1
4 TABLET, ORALLY DISINTEGRATING ORAL EVERY 6 HOURS PRN
Status: DISCONTINUED | OUTPATIENT
Start: 2023-07-21 | End: 2023-07-22 | Stop reason: HOSPADM

## 2023-07-21 RX ORDER — ONDANSETRON 2 MG/ML
4 INJECTION INTRAMUSCULAR; INTRAVENOUS EVERY 6 HOURS PRN
Status: DISCONTINUED | OUTPATIENT
Start: 2023-07-21 | End: 2023-07-22 | Stop reason: HOSPADM

## 2023-07-21 RX ORDER — ATORVASTATIN CALCIUM 40 MG/1
80 TABLET, FILM COATED ORAL EVERY EVENING
Status: DISCONTINUED | OUTPATIENT
Start: 2023-07-21 | End: 2023-07-22 | Stop reason: HOSPADM

## 2023-07-21 RX ORDER — LOSARTAN POTASSIUM 50 MG/1
50 TABLET ORAL DAILY
Status: DISCONTINUED | OUTPATIENT
Start: 2023-07-21 | End: 2023-07-22 | Stop reason: HOSPADM

## 2023-07-21 RX ORDER — FLUMAZENIL 0.1 MG/ML
0.2 INJECTION, SOLUTION INTRAVENOUS
Status: ACTIVE | OUTPATIENT
Start: 2023-07-21 | End: 2023-07-21

## 2023-07-21 RX ORDER — IODIXANOL 320 MG/ML
INJECTION, SOLUTION INTRAVASCULAR
Status: DISCONTINUED | OUTPATIENT
Start: 2023-07-21 | End: 2023-07-21 | Stop reason: HOSPADM

## 2023-07-21 RX ORDER — FENTANYL CITRATE 50 UG/ML
INJECTION, SOLUTION INTRAMUSCULAR; INTRAVENOUS
Status: DISCONTINUED | OUTPATIENT
Start: 2023-07-21 | End: 2023-07-21 | Stop reason: HOSPADM

## 2023-07-21 RX ORDER — SODIUM CHLORIDE 9 MG/ML
INJECTION, SOLUTION INTRAVENOUS CONTINUOUS
Status: DISCONTINUED | OUTPATIENT
Start: 2023-07-21 | End: 2023-07-21 | Stop reason: HOSPADM

## 2023-07-21 RX ORDER — ATORVASTATIN CALCIUM 80 MG/1
80 TABLET, FILM COATED ORAL DAILY
Qty: 90 TABLET | Refills: 3 | Status: SHIPPED | OUTPATIENT
Start: 2023-07-21 | End: 2024-03-25

## 2023-07-21 RX ORDER — FENTANYL CITRATE 50 UG/ML
25 INJECTION, SOLUTION INTRAMUSCULAR; INTRAVENOUS
Status: DISCONTINUED | OUTPATIENT
Start: 2023-07-21 | End: 2023-07-21 | Stop reason: HOSPADM

## 2023-07-21 RX ORDER — FENTANYL CITRATE 50 UG/ML
25 INJECTION, SOLUTION INTRAMUSCULAR; INTRAVENOUS
Status: DISCONTINUED | OUTPATIENT
Start: 2023-07-21 | End: 2023-07-21

## 2023-07-21 RX ORDER — HEPARIN SODIUM 1000 [USP'U]/ML
INJECTION, SOLUTION INTRAVENOUS; SUBCUTANEOUS
Status: DISCONTINUED | OUTPATIENT
Start: 2023-07-21 | End: 2023-07-21 | Stop reason: HOSPADM

## 2023-07-21 RX ADMIN — HYDRALAZINE HYDROCHLORIDE 10 MG: 20 INJECTION INTRAMUSCULAR; INTRAVENOUS at 16:03

## 2023-07-21 RX ADMIN — ATORVASTATIN CALCIUM 80 MG: 40 TABLET, FILM COATED ORAL at 20:07

## 2023-07-21 RX ADMIN — PERFLUTREN 2 ML: 6.52 INJECTION, SUSPENSION INTRAVENOUS at 14:10

## 2023-07-21 RX ADMIN — TICAGRELOR 90 MG: 90 TABLET ORAL at 20:07

## 2023-07-21 RX ADMIN — DIAZEPAM 5 MG: 5 TABLET ORAL at 11:58

## 2023-07-21 RX ADMIN — AMLODIPINE BESYLATE 2.5 MG: 2.5 TABLET ORAL at 18:34

## 2023-07-21 RX ADMIN — INSULIN GLARGINE 31 UNITS: 100 INJECTION, SOLUTION SUBCUTANEOUS at 21:47

## 2023-07-21 RX ADMIN — LOSARTAN POTASSIUM 50 MG: 50 TABLET, FILM COATED ORAL at 18:34

## 2023-07-21 RX ADMIN — SODIUM CHLORIDE 75 ML/HR: 9 INJECTION, SOLUTION INTRAVENOUS at 16:54

## 2023-07-21 ASSESSMENT — ACTIVITIES OF DAILY LIVING (ADL)
ADLS_ACUITY_SCORE: 35
ADLS_ACUITY_SCORE: 37
ADLS_ACUITY_SCORE: 35
ADLS_ACUITY_SCORE: 35

## 2023-07-21 NOTE — INTERVAL H&P NOTE
"I have reviewed the surgical (or preoperative) H&P that is linked to this encounter, and examined the patient. There are no significant changes    Clinical Conditions Present on Arrival:  Clinically Significant Risk Factors Present on Admission                 # Drug Induced Platelet Defect: home medication list includes an antiplatelet medication  # DMII: A1C = 8.5 % (Ref range: 0.0 - 5.6 %) within past 6 months  # Obesity: Estimated body mass index is 32.5 kg/m  as calculated from the following:    Height as of this encounter: 1.803 m (5' 11\").    Weight as of this encounter: 105.7 kg (233 lb).       "

## 2023-07-21 NOTE — PHARMACY-ADMISSION MEDICATION HISTORY
Pharmacist Admission Medication History    Admission medication history is complete. The information provided in this note is only as accurate as the sources available at the time of the update.    Medication reconciliation/reorder completed by provider prior to medication history? Yes    Information Source(s): Patient and CareEverywhere/SureScripts via in-person    Changes made to PTA medication list:    Added: THC Ointment, rolaids    Deleted: None    Changed: glargine    Medication Affordability:  Not including over the counter (OTC) medications, was there a time in the past 3 months when you did not take your medications as prescribed because of cost?: No    Allergies reviewed with patient and updates made in EHR: yes    Medication History Completed By: Sarah Finley [facility] 7/21/2023 3:26 PM    Prior to Admission medications    Medication Sig Last Dose Taking? Auth Provider Long Term End Date   aspirin 81 MG EC tablet Take 1 tablet (81 mg) by mouth daily Start tomorrow.  Yes Landon Tellez MD     aspirin 81 MG EC tablet [ASPIRIN 81 MG EC TABLET] Take 1 tablet (81 mg total) by mouth daily. 7/21/2023 at am Yes Ron Fisher, CNP     atorvastatin (LIPITOR) 80 MG tablet Take 1 tablet (80 mg) by mouth daily  Yes Landon Tellez MD Yes    insulin glargine (BASAGLAR KWIKPEN) 100 UNIT/ML pen Inject 30 Units Subcutaneous At Bedtime Will sometimes take 31 units if has extra carbs 7/20/2023 at pm Yes Unknown, Entered By History No    metFORMIN (GLUCOPHAGE) 1000 MG tablet Take 1 tablet (1,000 mg) by mouth 2 times daily (with meals) 7/20/2023 at pm Yes Robin Colby MD Yes    metoprolol tartrate (LOPRESSOR) 25 MG tablet TAKE 1 TABLET(25 MG) BY MOUTH TWICE DAILY 7/21/2023 at am Yes Robin Colby MD Yes    simvastatin (ZOCOR) 40 MG tablet Take 1 tablet (40 mg) by mouth At Bedtime 7/20/2023 at pm Yes Robin Colby MD Yes    ticagrelor (BRILINTA) 90 MG tablet Take 1 tablet (90 mg) by mouth 2 times  daily Dose to start this evening.  Yes Landon Tellez MD Yes    TRULICITY 3 MG/0.5ML SOPN Inject 3 mg Subcutaneous every 7 days 7/17/2023 at am Yes Robin Colby MD     UNABLE TO FIND Apply 1 Application topically as needed MEDICATION NAME: THC/CBD topical ointment to toes prn Past Month at prn Yes Unknown, Entered By History     blood glucose (ACCU-CHEK GUIDE) test strip TEST BLOOD GLUCOSE TWICE DAILY   Austen Mcdaniels MD     blood glucose (NO BRAND SPECIFIED) test strip 1 strip by In Vitro route 2 times daily Use to test blood sugar 2 times daily or as directed.   Austen Mcdaniels MD     insulin pen needle (BD PEN NEEDLE LIVAN 2ND GEN) 32G X 4 MM miscellaneous USE WITH BASAGLAR ONCE DAILY AS NEEDED   Robin Colby MD

## 2023-07-21 NOTE — Clinical Note
Stent deployed in the proximal right coronary artery. Max pressure = 11 rosalinda. Total duration = 30 seconds.

## 2023-07-21 NOTE — Clinical Note
The first balloon was inserted into the right coronary artery and proximal right coronary artery.Max pressure = 4 rosalinda. Total duration = 5 seconds.     Max pressure = 12 rosalinda. Total duration = 5 seconds.  Balloon reinflated a fourth time: Max pressure = 12 rosalinda. Total duration = 7 seconds.

## 2023-07-21 NOTE — DISCHARGE INSTRUCTIONS

## 2023-07-21 NOTE — PRE-PROCEDURE
GENERAL PRE-PROCEDURE:   Procedure:  Coronary angiogram with possible PCI  Date/Time:  7/21/2023 12:01 PM    Written consent obtained?: Yes    Risks and benefits: Risks, benefits and alternatives were discussed    Consent given by:  Patient  Patient states understanding of procedure being performed: Yes    Patient's understanding of procedure matches consent: Yes    Procedure consent matches procedure scheduled: Yes    Expected level of sedation:  Moderate  Appropriately NPO:  Yes  ASA Class:  3 (angina, CAD, HTN, HLD, elevated triglycerides, NIDDM, Class I obesity; BMI 32.57kg/m2)  Mallampati  :  Grade 3- soft palate visible, posterior pharyngeal wall not visible  Lungs:  Lungs clear with good breath sounds bilaterally  Heart:  Normal heart sounds and rate  History & Physical reviewed:  History and physical reviewed and no updates needed  Statement of review:  I have reviewed the lab findings, diagnostic data, medications, and the plan for sedation

## 2023-07-21 NOTE — PROGRESS NOTES
"[facility] Hospitalist Progress Note  [facility]  Admission date: 7/21/2023    Summary:    72M with DM2, HTN, CAD admitted for pallned angiography for progressive angina and found to have severe RCA stenosis s/p SERINA    Assessment/Plan    #CAD s/p PCI  -DAPT, high intensity statin, lopressor (held for marylu)    #bradycardia - looks blike intermittent 2nd degree.  On tele I think type I.  Check EKG, continue tele.  Hold lopressor for now.  Could be from reperfusion.      #IDDM - lantus 30. sliding scale insulin    ancitipate discharge tomorrow.    Addendum:  Asked to see again for new Severe L flank.  Worse with deep inspiration.  Nothing in chest  Has hx stone  Check UA, CT A/P, BMP. Pain control      Checklist:  Code Status: Full Code    Diet: Low Saturated Fat Na <2400 mg      Disposition and Discharge Planning  Auto-populated from discharge tab:     Expected Discharge Date: 07/22/2023                 System Identified Risk Variables  Auto-populated based on system request - if needs to be addressed in treatment plan they will be addressed above:  \"  Clinically Significant Risk Factors Present on Admission                # Drug Induced Platelet Defect: home medication list includes an antiplatelet medication       # DMII: A1C = 8.5 % (Ref range: 0.0 - 5.6 %) within past 6 months    # Obesity: Estimated body mass index is 32.5 kg/m  as calculated from the following:    Height as of this encounter: 1.803 m (5' 11\").    Weight as of this encounter: 105.7 kg (233 lb).            \"    Interval Events/Subjective/Notable results:    Feeling well.  Tele, cath, ECHO reviewed    Objective    Vital signs in last 24 hours  Temp:  [97.4  F (36.3  C)-97.8  F (36.6  C)] 97.4  F (36.3  C)  Pulse:  [46-60] 46  Resp:  [10-19] 16  BP: (126-192)/(67-88) 180/87  SpO2:  [93 %-100 %] 99 % O2 Device: None (Room air)    Weight:   233 lbs 0 oz  Body mass index is 32.5 kg/m .    Intake/Output last 3 shifts  No intake/output data " recorded.    Physical Exam  General:  Alert, cooperative, no distress    Neurologic:  oriented, facial symmetry preserved, fluent speech.   Psych: calm  HEENT:  Anicteric, MMM  CV: RRR no MRG, normal S1 and S2, no edema  Lungs: CTAB.  Easyrespirations  Abd: soft, obese, NT  Skin: no rashes or jaundice noted on exposed skin.    Galdamez Catheter: Not present  Lines: None          Medical Decision Making               Zacarias Cuadra MD, [facility]  Internal Medicine Hospitalist

## 2023-07-21 NOTE — Clinical Note
The first balloon was inserted into the right coronary artery and proximal right coronary artery.Max pressure = 6 rosalinda. Total duration = 6 seconds.

## 2023-07-22 ENCOUNTER — APPOINTMENT (OUTPATIENT)
Dept: OCCUPATIONAL THERAPY | Facility: HOSPITAL | Age: 73
End: 2023-07-22
Attending: INTERNAL MEDICINE
Payer: MEDICARE

## 2023-07-22 VITALS
HEIGHT: 71 IN | WEIGHT: 227.2 LBS | RESPIRATION RATE: 18 BRPM | TEMPERATURE: 98.2 F | OXYGEN SATURATION: 94 % | DIASTOLIC BLOOD PRESSURE: 74 MMHG | BODY MASS INDEX: 31.81 KG/M2 | HEART RATE: 85 BPM | SYSTOLIC BLOOD PRESSURE: 143 MMHG

## 2023-07-22 LAB
ANION GAP SERPL CALCULATED.3IONS-SCNC: 10 MMOL/L (ref 7–15)
ATRIAL RATE - MUSE: 61 BPM
ATRIAL RATE - MUSE: 74 BPM
BUN SERPL-MCNC: 9.9 MG/DL (ref 8–23)
CALCIUM SERPL-MCNC: 10.1 MG/DL (ref 8.8–10.2)
CHLORIDE SERPL-SCNC: 102 MMOL/L (ref 98–107)
CREAT SERPL-MCNC: 0.89 MG/DL (ref 0.67–1.17)
DEPRECATED HCO3 PLAS-SCNC: 25 MMOL/L (ref 22–29)
DIASTOLIC BLOOD PRESSURE - MUSE: NORMAL MMHG
DIASTOLIC BLOOD PRESSURE - MUSE: NORMAL MMHG
GFR SERPL CREATININE-BSD FRML MDRD: 90 ML/MIN/1.73M2
GLUCOSE BLDC GLUCOMTR-MCNC: 191 MG/DL (ref 70–99)
GLUCOSE SERPL-MCNC: 189 MG/DL (ref 70–99)
HOLD SPECIMEN: NORMAL
INTERPRETATION ECG - MUSE: NORMAL
INTERPRETATION ECG - MUSE: NORMAL
P AXIS - MUSE: 16 DEGREES
P AXIS - MUSE: 18 DEGREES
POTASSIUM SERPL-SCNC: 4.2 MMOL/L (ref 3.4–5.3)
PR INTERVAL - MUSE: 288 MS
PR INTERVAL - MUSE: 344 MS
QRS DURATION - MUSE: 102 MS
QRS DURATION - MUSE: 92 MS
QT - MUSE: 428 MS
QT - MUSE: 444 MS
QTC - MUSE: 366 MS
QTC - MUSE: 475 MS
R AXIS - MUSE: -33 DEGREES
R AXIS - MUSE: -37 DEGREES
SODIUM SERPL-SCNC: 137 MMOL/L (ref 136–145)
SYSTOLIC BLOOD PRESSURE - MUSE: NORMAL MMHG
SYSTOLIC BLOOD PRESSURE - MUSE: NORMAL MMHG
T AXIS - MUSE: -22 DEGREES
T AXIS - MUSE: -28 DEGREES
VENTRICULAR RATE- MUSE: 41 BPM
VENTRICULAR RATE- MUSE: 74 BPM

## 2023-07-22 PROCEDURE — 99214 OFFICE O/P EST MOD 30 MIN: CPT | Mod: 25 | Performed by: INTERNAL MEDICINE

## 2023-07-22 PROCEDURE — 250N000013 HC RX MED GY IP 250 OP 250 PS 637: Performed by: INTERNAL MEDICINE

## 2023-07-22 PROCEDURE — 250N000013 HC RX MED GY IP 250 OP 250 PS 637: Performed by: HOSPITALIST

## 2023-07-22 PROCEDURE — 80048 BASIC METABOLIC PNL TOTAL CA: CPT | Performed by: INTERNAL MEDICINE

## 2023-07-22 PROCEDURE — G0378 HOSPITAL OBSERVATION PER HR: HCPCS

## 2023-07-22 PROCEDURE — 93005 ELECTROCARDIOGRAM TRACING: CPT

## 2023-07-22 PROCEDURE — 93010 ELECTROCARDIOGRAM REPORT: CPT | Mod: HOP | Performed by: INTERNAL MEDICINE

## 2023-07-22 PROCEDURE — 97165 OT EVAL LOW COMPLEX 30 MIN: CPT | Mod: GO

## 2023-07-22 PROCEDURE — 97110 THERAPEUTIC EXERCISES: CPT | Mod: GO

## 2023-07-22 PROCEDURE — 82962 GLUCOSE BLOOD TEST: CPT

## 2023-07-22 PROCEDURE — 250N000012 HC RX MED GY IP 250 OP 636 PS 637: Performed by: HOSPITALIST

## 2023-07-22 PROCEDURE — 36415 COLL VENOUS BLD VENIPUNCTURE: CPT | Performed by: INTERNAL MEDICINE

## 2023-07-22 PROCEDURE — 99239 HOSP IP/OBS DSCHRG MGMT >30: CPT | Performed by: HOSPITALIST

## 2023-07-22 RX ORDER — METOPROLOL SUCCINATE 25 MG/1
25 TABLET, EXTENDED RELEASE ORAL DAILY
Status: DISCONTINUED | OUTPATIENT
Start: 2023-07-22 | End: 2023-07-22 | Stop reason: HOSPADM

## 2023-07-22 RX ORDER — AMLODIPINE BESYLATE 2.5 MG/1
2.5 TABLET ORAL DAILY
Qty: 30 TABLET | Refills: 0 | Status: SHIPPED | OUTPATIENT
Start: 2023-07-23 | End: 2023-08-01

## 2023-07-22 RX ORDER — METOPROLOL SUCCINATE 25 MG/1
25 TABLET, EXTENDED RELEASE ORAL DAILY
Qty: 30 TABLET | Refills: 0 | Status: SHIPPED | OUTPATIENT
Start: 2023-07-22 | End: 2023-08-01

## 2023-07-22 RX ORDER — LANOLIN ALCOHOL/MO/W.PET/CERES
3 CREAM (GRAM) TOPICAL
Status: DISCONTINUED | OUTPATIENT
Start: 2023-07-22 | End: 2023-07-22 | Stop reason: HOSPADM

## 2023-07-22 RX ORDER — LOSARTAN POTASSIUM 50 MG/1
50 TABLET ORAL DAILY
Qty: 30 TABLET | Refills: 0 | Status: SHIPPED | OUTPATIENT
Start: 2023-07-23 | End: 2023-08-01

## 2023-07-22 RX ADMIN — TICAGRELOR 90 MG: 90 TABLET ORAL at 08:39

## 2023-07-22 RX ADMIN — LOSARTAN POTASSIUM 50 MG: 50 TABLET, FILM COATED ORAL at 08:38

## 2023-07-22 RX ADMIN — AMLODIPINE BESYLATE 2.5 MG: 2.5 TABLET ORAL at 08:39

## 2023-07-22 RX ADMIN — INSULIN ASPART 2 UNITS: 100 INJECTION, SOLUTION INTRAVENOUS; SUBCUTANEOUS at 08:39

## 2023-07-22 RX ADMIN — Medication 81 MG: at 08:39

## 2023-07-22 ASSESSMENT — ACTIVITIES OF DAILY LIVING (ADL)
ADLS_ACUITY_SCORE: 37

## 2023-07-22 NOTE — PLAN OF CARE
Goal Outcome Evaluation:   1515 to 1930  Pt had angio in right wrist.  's gave IV hydralazine.   Around 1630, pt was in SR with intermittent 2nd degree type 2. Dr. Cuadra was in room.  SB/p 150/  On recheck.  EKG done.  About 1700 pt had terrible intermittent stabbing sharp pain in left lower abdomen.   Notified Dr. Cuadra , ordered another stat EKG.  CT of abdomen. Hospitalist wanted Cardiology notified.  Dr. Hartmann notified and reviewed EKG. Ok to continue to monitor Heart rhythm .  Po b/p meds order and given.

## 2023-07-22 NOTE — PROGRESS NOTES
[facility] HEART CARE [address]  [place] [address]   [email or web address]   OFFICE: [phone]     CARDIOLOGY FOLLOW-UP NOTE      Impression and Plan     Impression:   Coronary artery disease s/p PCI to the LAD and RCA yesterday. Angina has resolved.  Bradycardia - due to frequent blocked PACs. No high grade AV block identified.  IDDM    Plan:  Okay for discharge from cardiac standpoint.  DAPT for minimum 12 months, consider indefinite.  Simvastatin changed to atorvastatin 80 mg at bedtime.  Amlodipine added for blood pressure control. Continue losartan.  Decrease home metoprolol and change to metoprolol succinate 25 mg once daily  Follow up in post PCI clinic in 2 weeks and with Dr. Aj in 6-8 weeks.    Primary Cardiologist: Dr. Aj    Subjective     Feeling much better. Angina resolved. Able to walk halls and up stairs without pain.    Cardiac Diagnostics   Telemetry (personally reviewed): sinus rhythm and overnight had frequent blocked PACs. Some second degree type I (wencheback) physiology also noted.    ECG (personally reviewed and interpreted): sinus rhythm with blocked PACS.    Echocardiogram (results reviewed):   TTE 7/21/23  Left ventricular size, wall motion and function are normal. The ejection fraction is 55-60%.  There is mild concentric left ventricular hypertrophy.  Grade I or early diastolic dysfunction.  Normal right ventricle size and systolic function.  The left atrium is mildly dilated.  No significant valvular disease.     No previous study for comparison.    Cardiac Cath (results reviewed): 7/21/23  Findings:  LM:no obstruction  LAD:patent proximal stent, mid-vessel 50% narrowing at the take off of a D1, which in turn has 50% ostial narrowing. DFR 0.93 in mLAD, 0.93 in D1  Lcx:large RI/OM1 w/ no obstruction, otherwise mildly irregular  RCA:dominant, proximal 95% narrowing     Access:  R Radial artery    - severe pRCA narrowing s/p DESx1  "to pRCA, dFR-negative disease in mLAD/D1  - ASA 81mg daily indefinitely, ticagrelor 180mg once, followed by 90mg twice daily for at least 12 months. Ideally, would switch to ASA/clopidogrel after 1 yhear and continue DAPT indefinitely  - switch statin to atorva 80  - continue aggressive risk factor modification    Physical Examination       /76 (BP Location: Left arm)   Pulse 64   Temp 98.2  F (36.8  C) (Oral)   Resp 18   Ht 1.803 m (5' 11\")   Wt 103.1 kg (227 lb 3.2 oz)   SpO2 94%   BMI 31.69 kg/m            Intake/Output Summary (Last 24 hours) at 7/22/2023 0907  Last data filed at 7/22/2023 0711  Gross per 24 hour   Intake 240 ml   Output 1600 ml   Net -1360 ml       General: pleasant male. No acute distress.   HENT: external ears normal. Nares patent. Mucous membranes moist.  Eyes: perrla, extraocular muscles intact. No scleral icterus.   Neck: No JVD  Lungs: clear to auscultation  COR:  regular rate and rhythm, No murmurs, rubs, or gallops  Abd: nondistended, BS present  Extrem: No edema. R wrist is stable.         Imaging      CT abdomen/pelvis  IMPRESSION: No acute process demonstrated.     Lab Results   Lab Results   Component Value Date    CHOL 138 07/21/2023    HDL 42 07/21/2023    TRIG 176 (H) 07/21/2023    BUN 9.9 07/22/2023     07/22/2023    CO2 25 07/22/2023       Lab Results   Component Value Date    WBC 7.0 07/19/2023    HGB 14.1 07/19/2023    HCT 42.2 07/19/2023    MCV 92 07/19/2023     07/19/2023       No results found for: CKTOTAL, CKMB, TROPONINI, BNP, TSH, INR            Current Inpatient Scheduled Medications   Scheduled Meds:   amLODIPine  2.5 mg Oral Daily    aspirin  81 mg Oral Daily    atorvastatin  80 mg Oral QPM    insulin aspart  1-7 Units Subcutaneous TID AC    insulin glargine  31 Units Subcutaneous At Bedtime    losartan  50 mg Oral Daily    [Held by provider] metoprolol tartrate  25 mg Oral BID    ticagrelor  90 mg Oral Q12H     Continuous Infusions:   - " MEDICATION INSTRUCTIONS -      Percutaneous Coronary Intervention orders placed (this is information for BPA alerting)              Medications Prior to Admission   Prior to Admission medications    Medication Sig Start Date End Date Taking? Authorizing Provider   aspirin 81 MG EC tablet Take 1 tablet (81 mg) by mouth daily Start tomorrow. 7/22/23  Yes Landon Tellez MD   aspirin 81 MG EC tablet [ASPIRIN 81 MG EC TABLET] Take 1 tablet (81 mg total) by mouth daily. 9/18/14  Yes Ron Fisher CNP   atorvastatin (LIPITOR) 80 MG tablet Take 1 tablet (80 mg) by mouth daily 7/21/23  Yes Landon Tellez MD   Ca Carbonate-Mag Hydroxide (ROLAIDS PO) Take 1 tablet by mouth as needed (heartburn)   Yes Unknown, Entered By History   insulin glargine (BASAGLAR KWIKPEN) 100 UNIT/ML pen Inject 30 Units Subcutaneous At Bedtime Will sometimes take 31 units if has extra carbs   Yes Unknown, Entered By History   metFORMIN (GLUCOPHAGE) 1000 MG tablet Take 1 tablet (1,000 mg) by mouth 2 times daily (with meals) 8/15/22  Yes Robin Colby MD   metoprolol tartrate (LOPRESSOR) 25 MG tablet TAKE 1 TABLET(25 MG) BY MOUTH TWICE DAILY 4/14/23  Yes Robin Colby MD   simvastatin (ZOCOR) 40 MG tablet Take 1 tablet (40 mg) by mouth At Bedtime 8/15/22  Yes Robin Colby MD   ticagrelor (BRILINTA) 90 MG tablet Take 1 tablet (90 mg) by mouth 2 times daily Dose to start this evening. 7/21/23  Yes Landon Tellez MD   TRULICITY 3 MG/0.5ML SOPN Inject 3 mg Subcutaneous every 7 days 5/1/23  Yes Robin Colby MD   UNABLE TO FIND Apply 1 Application topically as needed MEDICATION NAME: THC/CBD topical ointment to toes prn   Yes Unknown, Entered By History   blood glucose (ACCU-CHEK GUIDE) test strip TEST BLOOD GLUCOSE TWICE DAILY 6/16/23   Austen Mcdaniels MD   blood glucose (NO BRAND SPECIFIED) test strip 1 strip by In Vitro route 2 times daily Use to test blood sugar 2 times daily or as directed. 4/4/22    Austen Mcdaniels MD   insulin pen needle (BD PEN NEEDLE LIVAN 2ND GEN) 32G X 4 MM miscellaneous USE WITH BASAGLAR ONCE DAILY AS NEEDED 8/15/22   Robin Colby MD

## 2023-07-22 NOTE — PROGRESS NOTES
Care Management Discharge Note    Discharge Date: 07/22/2023       Discharge Disposition:  home    Discharge Services:  no skilled services    Discharge DME:  none    Discharge Transportation: family or friend will provide    Private pay costs discussed: Not applicable    Handoff Referral Completed: Yes    Additional Information:  Chart reviewed. Met with patient briefly, he is discharging home where he lives with his wife.   Patient is independent at baseline, not currently using an assistive device.  PANTOJA notice discussed and copy provided to patient.     Mohini Bran LGSW

## 2023-07-22 NOTE — PROGRESS NOTES
Went over angio restrictions and discharge instructions with pt and his wife.  Pt did not have any questions. Pt stated he had all his belongings.

## 2023-07-22 NOTE — PROGRESS NOTES
07/22/23 0745   Appointment Info   Signing Clinician's Name / Credentials (OT) Clare He WILLIAM OTR/L CLT   Quick Adds   Quick Adds Certification   Living Environment   People in Home spouse   Current Living Arrangements house   Transportation Anticipated family or friend will provide   Living Environment Comments I and active at baseline   General Information   Onset of Illness/Injury or Date of Surgery 07/21/23   Additional Occupational Profile Info/Pertinent History of Current Problem s/p angio with PCI   Existing Precautions/Restrictions no known precautions/restrictions   Cognitive Status Examination   Follows Commands WNL   Bed Mobility   Bed Mobility supine-sit   Supine-Sit [place] (Bed Mobility) independent   Transfers   Transfers sit-stand transfer   Sit-Stand Transfer   Sit-Stand [place] (Transfers) independent   Sit/Stand Transfer Comments amb in room I'ly   Balance   Balance Assessment no deficits were identified   Clinical Impression   Criteria for Skilled Therapeutic Interventions Met (OT) Yes, treatment indicated   OT Diagnosis post angio activity tolerance and educaotin   OT Problem List-Impairments impacting ADL problems related to;activity tolerance impaired;post-surgical precautions   Assessment of Occupational Performance 1-3 Performance Deficits   Identified Performance Deficits activity tolerance for mobility/stairs   Planned Therapy Interventions (OT) ADL retraining;home program guidelines;progressive activity/exercise   Clinical Decision Making Complexity (OT) low complexity   Risk & Benefits of therapy have been explained care plan/treatment goals reviewed   OT Total Evaluation Time   OT Eval, Low Complexity Minutes (00425) 8   Therapy Certification   Medical Diagnosis Angio with PCI   Start of Care Date 07/22/23   Certification date from 07/22/23   Certification date to 07/29/23   OT Goals   Therapy Frequency (OT) One time eval and treatment   OT Predicted Duration/Target  Date for Goal Attainment 07/22/23   OT Goals Cardiac Phase 1   OT: Understanding of cardiac education to maximize quality of life, condition management, and health outcomes Patient;Verbalize   OT: Perform aerobic activity with stable cardiovascular response intermittent;5 minutes;ambulation   OT: Functional/aerobic ambulation tolerance with stable cardiovascular response in order to return to home and community environment Independent;Greater than 300 feet   OT: Navigation of stairs simulating home set up with stable cardiovascular response in order to return to home and community environment Greater than 10 stairs;Independent   Interventions   Interventions Quick Adds Self-Care/Home Management;Therapeutic Procedures/Exercise;Cardiac Rehab   Self-Care/Home Management   Self-Care/Home Mgmt/ADL, Compensatory, Meal Prep Minutes (82961) 8   Treatment Detail/Skilled Intervention see CR education   Therapeutic Procedures/Exercise   Therapeutic Procedure: strength, endurance, ROM, flexibillity minutes (51144) 12   Treatment Detail/Skilled Intervention see amb and stairs   Ambulation   Workload 320, 75, 75   Symptoms Denies symptoms   Cardiovascular Response Hypertensive response to exercise   Exercise Details I with amb and good pacing.   Vital Signs Details slightly elevated  post amb -??hadn't received morning meds   Stairs   Workload 6,14   Symptoms   (mild SOB/headache after doing 14 stairs)   Cardiovascular Response Normal   Exercise Details I with stairs, two trials with rest breaks in between   Vital Signs Details WNL   Cardiac Education   Education Provided Outpatient Cardiac Rehab;Precautions;Resuming home activities;Signs and symptoms;Home exercise program   OT Discharge Planning   OT Discharge Recommendation (DC Rec) home with outpatient cardiac rehab   Total Session Time   Timed Code Treatment Minutes 20   Total Session Time (sum of timed and untimed services) 28    [facility]  Services  OUTPATIENT OCCUPATIONAL THERAPY  EVALUATION  PLAN OF TREATMENT FOR OUTPATIENT REHABILITATION  (COMPLETE FOR INITIAL CLAIMS ONLY)  Patient's Last Name, First Name, M.I.  YOB: 1950  Constantin Pa                          Provider's Name  [facility] Medical Record No.  [id number]                             Onset Date:  07/21/23   Start of Care Date:  07/22/23   Type:     ___PT   _X_OT   ___SLP Medical Diagnosis:  Angio with PCI                    OT Diagnosis:  post angio activity tolerance and educaotin Visits from SOC:  1     See note for plan of treatment, functional goals and certification details    I CERTIFY THE NEED FOR THESE SERVICES FURNISHED UNDER        THIS PLAN OF TREATMENT AND WHILE UNDER MY CARE     (Physician co-signature of this document indicates review and certification of the therapy plan).

## 2023-07-22 NOTE — PLAN OF CARE
Goal Outcome Evaluation:    Pt denies pain.  Right radial C/D/I.   HS , 31 units of Lantus given per order.   Pt up and voiding in bathroom with 1 assist.

## 2023-07-22 NOTE — PLAN OF CARE
Goal Outcome Evaluation:       No acute issues overnight. Pt's R radial angiogram site without complications; complains of tenderness to site, but declined any pain medication. Pt's heart rhythm continues to fluctuate between SR with 1st degree AV block and what appears to be second degree block; pt asymptomatic.

## 2023-07-22 NOTE — DISCHARGE SUMMARY
[facility]  Hospitalist Discharge Summary      Date of Admission:  7/21/2023  Date of Discharge:  7/22/2023  Discharging Provider: Marc Cullen MD  Discharge Service: Hospitalist Service    Discharge Diagnoses   CAD s/p PCI to LAD and RCA  Obesity    Follow-ups Needed After Discharge   Follow-up Appointments     Follow-up and recommended labs and tests       Follow up with primary care provider, Robin Colby, within 7 days   for hospital follow- up.  No follow up labs or test are needed.    Cardiology within 2 weeks and then 6 weeks.            Unresulted Labs Ordered in the Past 30 Days of this Admission       No orders found for last 31 day(s).        These results will be followed up by NA    Discharge Disposition   Discharged to home  Condition at discharge: Stable    Hospital Course   72 year old male with DM2, HTN, CAD admitted after planned angiography for progressive angina. Underwent coronary angiogram with PCI of SERINA to RCA. Post-op course was uncomplicated except for mild bradycardia. Seen by cardiology. Placed on DAPT (Aspirin and Brilinta) and discharged chest pain free. Also discharged on Metoprolol, Losartan, Amlodipine and statin.    #CAD s/p PCI  #HTN  -DAPT (Aspirin and Brilinta), high intensity statin  -BB, Losartan     #bradycardia  -Metoprolol changed to XL 25 mg Daily (from Tartrate 25 mg BID)    #IDDM  -Home Glargine on discharge    Consultations This Hospital Stay   HOSPITALIST IP CONSULT  NUTRITION SERVICES ADULT IP CONSULT  CARDIAC REHAB IP CONSULT  CARDIOLOGY IP CONSULT  PHARMACY IP CONSULT  SMOKING CESSATION PROGRAM IP CONSULT    Code Status   Full Code    Time Spent on this Encounter   I, Marc Cullen MD, personally saw the patient today and spent greater than 30 minutes discharging this patient.       Marc Cullen MD  [facility] HEART CARE  [address] [place] [id number]  Phone: [phone]  Fax:  [phone]  ______________________________________________________________________    Physical Exam   Vital Signs: Temp: 98.2  F (36.8  C) Temp src: Oral BP: 133/76 Pulse: 64   Resp: 18 SpO2: 94 % O2 Device: None (Room air) Oxygen Delivery: 2 LPM  Weight: 227 lbs 3.2 oz    General: NAD  CV: +S1/S2, no pitting edema  Respiratory: clear to auscultation bilaterally  GI: soft, non-tender, non-distended  Neuro: alert       Primary Care Physician   Robin Colby    Discharge Orders      Lipid Profile    Schedule Lipid profile in 4 weeks     Ambulatory CARDIAC REHAB REFERRAL      Ambulatory Cardiologist Referral      Follow-Up with Cardiology Ambulatory Heart Care [facility] Referral      Follow-Up with Cardiology [facility]      Follow-Up with Cardiology      Follow-up and recommended labs and tests     Follow up with primary care provider, Robin Colby, within 7 days for hospital follow- up.  No follow up labs or test are needed.    Cardiology within 2 weeks and then 6 weeks.     Activity    Your activity upon discharge: activity as tolerated     Reason for your hospital stay    Heart disease and you had stent placed in the RCA. You will need to make sure you take the blood thinners every single day and don't miss a dose. If you have any significant bleeding (including black/bloody stool) please reach out to a healthcare provider or if it's severe seek immediate medical care. I sent a month's supply of some of your medications and please speak with your PCP/cardiologist about getting more medication. If you have any recurrent chest pain seek medical care. Hold off on taking Metformin until tomorrow night and avoid using your right arm for about a week to avoid damaging the arm.     Diet    Follow this diet upon discharge: Heart Healthy (Low Salt, Low Fat), Diabetic Diet       Significant Results and Procedures   Most Recent 3 CBC's:  Recent Labs   Lab Test 07/19/23  1533 07/05/23  0946 03/28/22  0902   WBC 7.0 7.5 6.4    HGB 14.1 14.6 14.7   MCV 92 91 92    163 139*     Most Recent 3 BMP's:  Recent Labs   Lab Test 07/22/23  0730 07/22/23  0535 07/21/23  2055 07/21/23  1558 07/21/23  1429 07/19/23  1533   NA  --  137  --   --  141 141   POTASSIUM  --  4.2  --   --  4.2 4.3   CHLORIDE  --  102  --   --  100 103   CO2  --  25  --   --  26 29   BUN  --  9.9  --   --  10.1 13   CR  --  0.89  --   --  0.93 0.91   ANIONGAP  --  10  --   --  15 9   YOMAIRA  --  10.1  --   --  10.4* 9.6   * 189* 237*   < > 120* 244*    < > = values in this interval not displayed.     Most Recent 2 LFT's:  Recent Labs   Lab Test 03/28/22  0902   AST 36   ALT 62   ALKPHOS 52   BILITOTAL 1.2     Most Recent 3 INR's:No lab results found.  Most Recent 3 Troponin's:No lab results found.  Most Recent Cholesterol Panel:  Recent Labs   Lab Test 07/21/23  1429   CHOL 138   LDL 61   HDL 42   TRIG 176*     Most Recent Hemoglobin A1c:  Recent Labs   Lab Test 07/05/23  0946   A1C 8.5*   ,   Results for orders placed or performed during the hospital encounter of 07/21/23   CT Abdomen Pelvis w/o Contrast    Narrative    EXAM: CT ABDOMEN PELVIS W/O CONTRAST  LOCATION: [facility]  DATE: 7/21/2023    INDICATION: Severe left flank pain.    COMPARISON: None.    TECHNIQUE: CT scan of the abdomen and pelvis was performed without IV contrast. Multiplanar reformats were obtained. Dose reduction techniques were used.    CONTRAST: None.    FINDINGS:   LOWER CHEST: No infiltrates or effusions.    HEPATOBILIARY: No significant mass or bile duct dilatation. No calcified gallstones.     PANCREAS: No significant mass, duct dilatation, or inflammatory change.    SPLEEN: Normal size.    ADRENAL GLANDS: Tiny benign adrenal adenoma in the left adrenal gland. Right adrenal gland unremarkable.    KIDNEYS/BLADDER: No hydronephrosis to suggest stone disease. Contrast in the intrarenal collecting systems, ureters, and bladder from a prior procedure limits  evaluation for stone disease. Benign cyst requiring no follow-up on the left.    BOWEL: Diverticulosis of the colon. No acute inflammatory change. No obstruction.     LYMPH NODES: No adenopathy demonstrated in the absence of contrast.    VASCULATURE: There are mild atherosclerotic changes of the visualized aorta and its branches. There is no evidence of aortic aneurysm.    PELVIC ORGANS: No pelvic masses.    MUSCULOSKELETAL: No frankly destructive bony lesions. Spine degenerative changes. No groin hematoma demonstrated.      Impression    IMPRESSION: No acute process demonstrated.     Cardiac Catheterization    Narrative    Images from the original result were not included.  Constantin Pa is a 72 year old old male with CAD s/p prior stent to pLAD   '04, HTN, HL, DM who is here for w/up of accelerated angina.    - severe pRCA narrowing s/p DESx1 to pRCA, dFR-negative disease in mLAD/D1  - ASA 81mg daily indefinitely, ticagrelor 180mg once, followed by 90mg   twice daily for at least 12 months. Ideally, would switch to   ASA/clopidogrel after 1 yhear and continue DAPT indefinitely  - switch statin to atorva 80  - continue aggressive risk factor modification          Findings:  LM:no obstruction  LAD:patent proximal stent, mid-vessel 50% narrowing at the take off of a   D1, which in turn has 50% ostial narrowing. DFR 0.93 in mLAD, 0.93 in D1  Lcx:large RI/OM1 w/ no obstruction, otherwise mildly irregular  RCA:dominant, proximal 95% narrowing    Access:  R Radial artery    PCI:  RCA was engaged w/ a 6F JR4 Guide catheter and the lesion in RCA was wired   w/ a Forte wire (w/ 6F Guideline support), ballooned w/ a 2.5x12mm Emerge,   stented w/ a 3.0x20mm Synergy EES at 11 rosalinda, and post-dilated w/ a   4.0x12mm NC Emerge at 4-12 rosalinda inflations. Final angiography showed no   dissection or perforation and a ANNA 3 flow.    Closure:   Vasc Band           Discharge Medications   Current Discharge Medication List        START  taking these medications    Details   amLODIPine (NORVASC) 2.5 MG tablet Take 1 tablet (2.5 mg) by mouth daily  Qty: 30 tablet, Refills: 0    Associated Diagnoses: Percutaneous transluminal coronary angioplasty status      atorvastatin (LIPITOR) 80 MG tablet Take 1 tablet (80 mg) by mouth daily  Qty: 90 tablet, Refills: 3    Associated Diagnoses: ALARCON (dyspnea on exertion)      losartan (COZAAR) 50 MG tablet Take 1 tablet (50 mg) by mouth daily  Qty: 30 tablet, Refills: 0    Associated Diagnoses: Percutaneous transluminal coronary angioplasty status      metoprolol succinate ER (TOPROL XL) 25 MG 24 hr tablet Take 1 tablet (25 mg) by mouth daily  Qty: 30 tablet, Refills: 0    Associated Diagnoses: Percutaneous transluminal coronary angioplasty status      ticagrelor (BRILINTA) 90 MG tablet Take 1 tablet (90 mg) by mouth 2 times daily Dose to start this evening.  Qty: 180 tablet, Refills: 3    Associated Diagnoses: ALARCON (dyspnea on exertion)           CONTINUE these medications which have CHANGED    Details   aspirin 81 MG EC tablet Take 1 tablet (81 mg) by mouth daily Start tomorrow.  Qty: 30 tablet, Refills: 3    Associated Diagnoses: ALARCON (dyspnea on exertion)      metFORMIN (GLUCOPHAGE) 1000 MG tablet Take 1 tablet (1,000 mg) by mouth 2 times daily (with meals) Hold off on taking until tomorrow (7/23) evening  Qty: 180 tablet, Refills: 3    Associated Diagnoses: Type 2 diabetes mellitus without complication, with long-term current use of insulin (H)           CONTINUE these medications which have NOT CHANGED    Details   Ca Carbonate-Mag Hydroxide (ROLAIDS PO) Take 1 tablet by mouth as needed (heartburn)      insulin glargine (BASAGLAR KWIKPEN) 100 UNIT/ML pen Inject 30 Units Subcutaneous At Bedtime Will sometimes take 31 units if has extra carbs      TRULICITY 3 MG/0.5ML SOPN Inject 3 mg Subcutaneous every 7 days  Qty: 6 mL, Refills: 1    Associated Diagnoses: Type 2 diabetes mellitus without complication, with  long-term current use of insulin (H)      UNABLE TO FIND Apply 1 Application topically as needed MEDICATION NAME: THC/CBD topical ointment to toes prn      !! blood glucose (ACCU-CHEK GUIDE) test strip TEST BLOOD GLUCOSE TWICE DAILY  Qty: 100 strip, Refills: 3    Associated Diagnoses: Type 2 diabetes mellitus without complication, with long-term current use of insulin (H)      !! blood glucose (NO BRAND SPECIFIED) test strip 1 strip by In Vitro route 2 times daily Use to test blood sugar 2 times daily or as directed.  Qty: 100 strip, Refills: 3    Associated Diagnoses: Type 2 diabetes mellitus without complication, with long-term current use of insulin (H)      insulin pen needle (BD PEN NEEDLE LIVAN 2ND GEN) 32G X 4 MM miscellaneous USE WITH BASAGLAR ONCE DAILY AS NEEDED  Qty: 100 each, Refills: 3    Associated Diagnoses: Type 2 diabetes mellitus without complication, with long-term current use of insulin (H)       !! - Potential duplicate medications found. Please discuss with provider.        STOP taking these medications       metoprolol tartrate (LOPRESSOR) 25 MG tablet Comments:   Reason for Stopping:         simvastatin (ZOCOR) 40 MG tablet Comments:   Reason for Stopping:             Allergies   No Known Allergies

## 2023-07-31 ENCOUNTER — PATIENT OUTREACH (OUTPATIENT)
Dept: CARE COORDINATION | Facility: CLINIC | Age: 73
End: 2023-07-31
Payer: MEDICARE

## 2023-08-01 ENCOUNTER — OFFICE VISIT (OUTPATIENT)
Dept: FAMILY MEDICINE | Facility: CLINIC | Age: 73
End: 2023-08-01
Payer: MEDICARE

## 2023-08-01 VITALS
SYSTOLIC BLOOD PRESSURE: 119 MMHG | DIASTOLIC BLOOD PRESSURE: 75 MMHG | WEIGHT: 228.8 LBS | HEIGHT: 71 IN | BODY MASS INDEX: 32.03 KG/M2 | OXYGEN SATURATION: 97 % | RESPIRATION RATE: 20 BRPM | HEART RATE: 61 BPM | TEMPERATURE: 98.2 F

## 2023-08-01 DIAGNOSIS — Z98.61 PERCUTANEOUS TRANSLUMINAL CORONARY ANGIOPLASTY STATUS: ICD-10-CM

## 2023-08-01 DIAGNOSIS — E11.9 DIABETES MELLITUS TREATED WITH ORAL MEDICATION (H): Primary | ICD-10-CM

## 2023-08-01 DIAGNOSIS — I25.10 CORONARY ARTERY DISEASE INVOLVING NATIVE HEART WITHOUT ANGINA PECTORIS, UNSPECIFIED VESSEL OR LESION TYPE: ICD-10-CM

## 2023-08-01 DIAGNOSIS — Z79.84 DIABETES MELLITUS TREATED WITH ORAL MEDICATION (H): Primary | ICD-10-CM

## 2023-08-01 LAB
CREAT UR-MCNC: 128 MG/DL
MICROALBUMIN UR-MCNC: <12 MG/L
MICROALBUMIN/CREAT UR: NORMAL MG/G{CREAT}

## 2023-08-01 PROCEDURE — 82043 UR ALBUMIN QUANTITATIVE: CPT | Performed by: FAMILY MEDICINE

## 2023-08-01 PROCEDURE — 99214 OFFICE O/P EST MOD 30 MIN: CPT | Performed by: FAMILY MEDICINE

## 2023-08-01 PROCEDURE — 82570 ASSAY OF URINE CREATININE: CPT | Performed by: FAMILY MEDICINE

## 2023-08-01 RX ORDER — LOSARTAN POTASSIUM 50 MG/1
50 TABLET ORAL DAILY
Qty: 90 TABLET | Refills: 3 | Status: SHIPPED | OUTPATIENT
Start: 2023-08-01 | End: 2024-06-23

## 2023-08-01 RX ORDER — METOPROLOL SUCCINATE 25 MG/1
25 TABLET, EXTENDED RELEASE ORAL DAILY
Qty: 90 TABLET | Refills: 3 | Status: SHIPPED | OUTPATIENT
Start: 2023-08-01 | End: 2024-06-23

## 2023-08-01 RX ORDER — AMLODIPINE BESYLATE 2.5 MG/1
2.5 TABLET ORAL DAILY
Qty: 90 TABLET | Refills: 3 | Status: SHIPPED | OUTPATIENT
Start: 2023-08-01 | End: 2023-09-19

## 2023-08-01 NOTE — PROGRESS NOTES
"  Assessment & Plan     Coronary artery disease involving native heart without angina pectoris, unspecified vessel or lesion type  Patient recently had a stent placed and is severely compromised right coronary artery.  He will be starting outpatient cardiac rehab next week.  He will continue with his current medications.  He is on dual antiplatelet therapy.  He will follow-up with cardiology as scheduled    Diabetes mellitus treated with oral medication (H)  Diabetes is not well controlled hemoglobin A1c of 8.6 hospitalized for his stent.  He will increase insulin glargine to 40 units daily and continue to monitor his blood glucose  - Albumin Random Urine Quantitative with Creat Ratio    Percutaneous transluminal coronary angioplasty status  See above  - amLODIPine (NORVASC) 2.5 MG tablet; Take 1 tablet (2.5 mg) by mouth daily  - losartan (COZAAR) 50 MG tablet; Take 1 tablet (50 mg) by mouth daily  - metoprolol succinate ER (TOPROL XL) 25 MG 24 hr tablet; Take 1 tablet (25 mg) by mouth daily           MED REC REQUIRED  Post Medication Reconciliation Status: discharge medications reconciled, continue medications without change  BMI:   Estimated body mass index is 31.91 kg/m  as calculated from the following:    Height as of this encounter: 1.803 m (5' 11\").    Weight as of this encounter: 103.8 kg (228 lb 12.8 oz).           Robin Colby MD  [facility]    Christopher Killian is a 72 year old, presenting for the following health issues:  Hospital F/U        8/1/2023     3:09 PM   Additional Questions   Roomed by Naina RAPP CMA   Accompanied by Self       HPI       Hospital Follow-up Visit:    Hospital/Nursing Home/IP Rehab Facility: [facility]  Date of Admission: 07/21/2023  Date of Discharge: 07/22/2023  Reason(s) for Admission: CAD s/p PCI to LAD and RCA     Was your hospitalization related to COVID-19? No   Problems taking medications regularly:  " "None  Medication changes since discharge: metoprolol dose changed to XL 25 mg daily from tartrate 25 mg bid  Problems adhering to non-medication therapy:  None    Summary of hospitalization:  [facility] discharge summary reviewed  Diagnostic Tests/Treatments reviewed.  Follow up needed: Cardiology as scheduled  Other Healthcare Providers Involved in Patient s Care:          Cardiac rehab  Update since discharge: improved.         Plan of care communicated with patient                   Review of Systems   Constitutional, HEENT, cardiovascular, pulmonary, gi and gu systems are negative, except as otherwise noted.      Objective    /75 (BP Location: Right arm, Patient Position: Sitting, Cuff Size: Adult Large)   Pulse 61   Temp 98.2  F (36.8  C) (Tympanic)   Resp 20   Ht 1.803 m (5' 11\")   Wt 103.8 kg (228 lb 12.8 oz)   SpO2 97%   BMI 31.91 kg/m    Body mass index is 31.91 kg/m .  Physical Exam   GENERAL: healthy, alert and no distress  NECK: no adenopathy, no asymmetry, masses, or scars and thyroid normal to palpation  RESP: lungs clear to auscultation - no rales, rhonchi or wheezes  CV: regular rate and rhythm, normal S1 S2, no S3 or S4, no murmur, click or rub, no peripheral edema and peripheral pulses strong  MS: no gross musculoskeletal defects noted, no edema                      "

## 2023-08-07 PROBLEM — I20.0 PROGRESSIVE ANGINA (H): Status: ACTIVE | Noted: 2023-08-07

## 2023-08-08 ENCOUNTER — OFFICE VISIT (OUTPATIENT)
Dept: CARDIOLOGY | Facility: CLINIC | Age: 73
End: 2023-08-08
Payer: MEDICARE

## 2023-08-08 VITALS
HEART RATE: 68 BPM | DIASTOLIC BLOOD PRESSURE: 52 MMHG | BODY MASS INDEX: 31.94 KG/M2 | SYSTOLIC BLOOD PRESSURE: 106 MMHG | OXYGEN SATURATION: 97 % | WEIGHT: 229 LBS | RESPIRATION RATE: 18 BRPM

## 2023-08-08 DIAGNOSIS — I20.0 PROGRESSIVE ANGINA (H): Primary | ICD-10-CM

## 2023-08-08 DIAGNOSIS — Z79.84 DIABETES MELLITUS TREATED WITH ORAL MEDICATION (H): ICD-10-CM

## 2023-08-08 DIAGNOSIS — Z98.61 PERCUTANEOUS TRANSLUMINAL CORONARY ANGIOPLASTY STATUS: ICD-10-CM

## 2023-08-08 DIAGNOSIS — I25.119 CORONARY ARTERY DISEASE INVOLVING NATIVE CORONARY ARTERY OF NATIVE HEART WITH ANGINA PECTORIS (H): ICD-10-CM

## 2023-08-08 DIAGNOSIS — E11.9 DIABETES MELLITUS TREATED WITH ORAL MEDICATION (H): ICD-10-CM

## 2023-08-08 DIAGNOSIS — E78.5 DYSLIPIDEMIA: Chronic | ICD-10-CM

## 2023-08-08 DIAGNOSIS — I10 PRIMARY HYPERTENSION: ICD-10-CM

## 2023-08-08 PROCEDURE — 99215 OFFICE O/P EST HI 40 MIN: CPT | Performed by: NURSE PRACTITIONER

## 2023-08-08 NOTE — PATIENT INSTRUCTIONS
Constantin Pa,    It was a pleasure to see you today at the [facility].     My recommendations after this visit include:    - No medications changes made today    - Please seek medical attention if you develop chest pain or shortness of breath or similar symptoms you experienced prior to recent cardiac event    - Cardiac rehab as scheduled    - Follow up with Dr. Aj as scheduled in September    - Please call [phone], if you have any questions or concerns    Lesley Baker CNP    Medication     Take all your medications as prescribed  Do not stop any medications without talking with a healthcare provider    Exercise      Physical activity is important for overall health  Set a goal of 150 minutes of exercise each week  For example, 30 minutes of exercise 5 days each week.    These 30 minutes can be broken into shorter periods of 15 minutes twice daily or 10 minutes three times daily  Start any exercise program slowly and work towards the goal of 150 minutes each week  For example, you may start with 10 minutes and plan to add a few minutes each week as you get stronger   Examples of exercise include walking, swimming, or biking  Remember to stretch and stay hydrated with exercise    Diet     A heart healthy diet includes:  A variety of fruits and vegetables  Whole grains  Low-fat dairy (fat-free, 1% fat, and low-fat)  Lean meats and poultry without skin   Fish (eat fish 2 times each week)  Nuts  Limit saturated fat to about 13 grams each day (based on a 2000 calorie diet)  Limit red meat  Limit sugars (sweets and sugary beverages)  Limit your portion sizes  Do not add salt to your food when cooking or at the table  Limit alcohol intake (no more than 1 drink each day for women or 2 drinks each day for men)    Weight Loss     Work on losing weight with diet and exercise  You BMI (body mass index) should be between 18.5-24.9  This is a calculation of your weight and height  Please ask  your healthcare provider for your BMI    Manage Other Chronic Health Conditions     Control cholesterol  Eat a diet low in saturated fat  Exercise   Take a statin medication as prescribed  Manage blood pressure  Eat a diet low in sodium  Exercise  Reduce stress  Lose weight   Take blood pressure medications as prescribed  Control blood sugars if diabetic  Monitor sugars and carbohydrates in your diet  Lose weight   Take diabetes medications as prescribed  Follow-up with your primary care provider to make sure your blood sugars are well controlled    Stress Reduction     Find time each day to relax  Reading, listening to music, yoga, meditation, exercise, spending time with friends and family, volunteering   Get 6-8 hours of sleep each night    Smoking Cessation     Smoking causes numerous health problems including coronary artery disease  It is never too late to quit  Set realistic goals for quitting  Decrease the number of cigarettes used each week  Use nicotine gum or patches to help you quit    Information from the [employer] Association.  Please visit their website at www.heart.org

## 2023-08-08 NOTE — PROGRESS NOTES
Assessment/Recommendations   Assessment:    1.  Coronary artery disease: Patient saw Dr. Aj on 7/19/2023 for progressive angina.  History of known coronary disease with status post prior stent to proximal LAD in 2004: Coronary angiogram on 7/21/2020 showed 95% stenosis in proximal RCA which was successfully treated with a drug-eluting stent.  Previous stent in proximal RC LAD was found patent.  Noted 50% narrowing at the takeoff of the diagonal 1 with DFR of 0.93 and 50% ostial narrowing of mid LAD with DFR of 0.93.    Shortness of breath and chest discomfort has resolved since stent placement.    On dual antiplatelet therapy with ASA 81 mg indefinitely and Ticagrelor (Brilinta) 90 mg twice a day for 12 months.  Dr. Tellez recommended to stay on aspirin and clopidogrel indefinitely after 1 year.      Cardiac rehab has been initiated at [facility] cardiac rehab.    2.  Dyslipidemia with LDL goal <70/Obesity with a BMI of: Peter H Thierno is on high intensity statin therapy with atorvastatin 80 mg daily. Most recent LDL is 61 and triglycerides 176.      3.  Hypertension: His blood pressure is stable at 106/52. Currently on metoprolol succinate 25 mg daily and losartan.    4.  Diabetes: Most recent A1C is 8.5.      Plan:  - We discussed the importance of antiplatelet therapy and talking with his cardiologist prior to stopping these medications for any reason.      - Encouraged to seek medical attention if recurrent chest pain or shortness of breath.    - Risk factor modification and lifestyle management topics were discussed including managing comorbidities, weight loss, heart healthy diet, exercise, stress reduction, and alcohol use.      - Cardiac rehab as scheduled/    - We discussed a diet low in saturated fat, weight loss, and exercise along with medication for better control of cholesterol.  Highly encouraged to participate in nutrition class in cardiac rehab.    - Continue current hypertension  regimen    - We discussed the importance of tightly controlled blood sugars to minimize risk of worsening coronary artery disease. Defer to PCP for diabetes managment.      Follow up with Dr. Aj as scheduled in September      History of Present Illness/Subjective    Mr. Constantin Pa is a 72 year old male with a past medical history of coronary disease prior stent to proximal LAD in 2004, times hypertension, hyperlipidemia, diabetes mellitus, recent issue with accelerated angina and now coronary disease with status post PCI/SERINA to proximal RCA who is seen at [facility] for post coronary intervention follow up.     Most recent ECHO/Stress test showed an EF of preserved LVEF 55 to 60% with grade 1 or early diastolic dysfunction.  No significant valve disease noted..     Today, Constantin  denies fatigue, lightheadedness, shortness of breath, dyspnea on exertion, orthopnea, PND, palpitations, chest pain, abdominal fullness/bloating, and lower extremity edema.  Occasionally he has to catch his breath.  He denies any bleeding complications.  He is in cardiac rehab.    Coronary Angiogram: reviewed:  Conclusion    Constantin Pa is a 72 year old old male with CAD s/p prior stent to pLAD '04, HTN, HL, DM who is here for w/up of accelerated angina.     - severe pRCA narrowing s/p DESx1 to pRCA, dFR-negative disease in mLAD/D1  - ASA 81mg daily indefinitely, ticagrelor 180mg once, followed by 90mg twice daily for at least 12 months. Ideally, would switch to ASA/clopidogrel after 1 yhear and continue DAPT indefinitely  - switch statin to atorva 80  - continue aggressive risk factor modification        Findings:  LM:no obstruction  LAD:patent proximal stent, mid-vessel 50% narrowing at the take off of a D1, which in turn has 50% ostial narrowing. DFR 0.93 in mLAD, 0.93 in D1  Lcx:large RI/OM1 w/ no obstruction, otherwise mildly irregular  RCA:dominant, proximal 95% narrowing     Access:  R  Radial artery     PCI:  RCA was engaged w/ a 6F JR4 Guide catheter and the lesion in RCA was wired w/ a Forte wire (w/ 6F Guideline support), ballooned w/ a 2.5x12mm Emerge, stented w/ a 3.0x20mm Synergy EES at 11 rosalinda, and post-dilated w/ a 4.0x12mm NC Emerge at 4-12 rosalinda inflations. Final angiography showed no dissection or perforation and a ANNA 3 flow.     Closure:   Vasc Band      ECHO from 7/21/23-Reviewed:   Interpretation Summary     Left ventricular size, wall motion and function are normal. The ejection  fraction is 55-60%.  There is mild concentric left ventricular hypertrophy.  Grade I or early diastolic dysfunction.  Normal right ventricle size and systolic function.  The left atrium is mildly dilated.  No significant valvular disease.     No previous study for comparison.       Physical Examination Review of Systems   /52 (BP Location: Right arm, Patient Position: Sitting, Cuff Size: Adult Large)   Pulse 68   Resp 18   Wt 103.9 kg (229 lb)   SpO2 97%   BMI 31.94 kg/m    Body mass index is 31.94 kg/m .  Wt Readings from Last 3 Encounters:   08/08/23 103.9 kg (229 lb)   08/01/23 103.8 kg (228 lb 12.8 oz)   07/22/23 103.1 kg (227 lb 3.2 oz)     General Appearance:   no distress, normal body habitus   ENT/Mouth: membranes moist, no oral lesions or bleeding gums.      EYES:  no scleral icterus, normal conjunctivae   Neck: no carotid bruits or thyromegaly   Chest/Lungs:   lungs are clear to auscultation, no rales or wheezing, equal chest wall expansion    Cardiovascular:   Heart rate regular. Normal first and second heart sounds with no murmurs, rubs, or gallops; the carotid, radial and posterior tibial pulses are intact, Jugular venous pressure flat with no edema bilaterally    Abdomen:  no organomegaly, masses, bruits, or tenderness; bowel sounds are present   Extremities  Puncture Site: no cyanosis or clubbing  Right radial site is soft with no bruising.  Radial pulses and Pedal pulses intact and  symmetrical.  CMS intact.   Skin: no xanthelasma, warm.    Neurologic: normal  bilateral, no tremors     Psychiatric: alert and oriented x3, calm                                                        Negative unless noted in HPI     Medical History  Surgical History Family History Social History   Past Medical History:   Diagnosis Date    CAD (coronary artery disease)     Past Surgical History:   Procedure Laterality Date    ANGIOPLASTY  12/2004    APPENDECTOMY  01/15/2016    ARTHROPLASTY KNEE  09/16/2014    ARTHROPLASTY KNEE  12/20/2013    COLONOSCOPY  03/23/2021    Tubular adenoma x3 size 3-11mm; repeat in 3 yrs    COLONOSCOPY  01/22/2018    COLONOSCOPY  09/19/2016    COLONOSCOPY  12/09/2015    COLONOSCOPY  07/2009    COLONOSCOPY  02/03/2006    CORONARY STENT PLACEMENT N/A x1    10 years ago    CV CORONARY ANGIOGRAM N/A 7/21/2023    Procedure: Coronary Angiogram;  Surgeon: Landon Tellez MD;  Location: [facility] CATH LAB CV    CV FRACTIONAL FLOW RATIO WIRE N/A 7/21/2023    Procedure: Fractional Flow Ratio Wire;  Surgeon: Landon Tellez MD;  Location: [facility] CATH LAB CV    CV PCI N/A 7/21/2023    Procedure: Percutaneous Coronary Intervention;  Surgeon: Landon Tellez MD;  Location: [facility] CATH LAB CV    FLEXIBLE SIGMOIDOSCOPY  12/16/2005    JOINT REPLACEMENT Right     knee    LITHOTRIPSY  1996    OTHER SURGICAL HISTORY      lithotrypsy    Shoulder Class 4 Separation  1978    SHOULDER SURGERY      STRESS TEST  02/15/2007    STRESS TEST  07/18/2013    ECG treadmill    TONSILLECTOMY & ADENOIDECTOMY  1954    Family History   Adopted: Yes    Social History     Socioeconomic History    Marital status:      Spouse name: Not on file    Number of children: Not on file    Years of education: Not on file    Highest education level: Not on file   Occupational History    Not on file   Tobacco Use    Smoking status: Former     Passive exposure: Past    Smokeless tobacco: Never   Vaping Use    Vaping  Use: Never used   Substance and Sexual Activity    Alcohol use: Yes     Alcohol/week: 0.0 standard drinks of alcohol    Drug use: Not on file    Sexual activity: Not on file   Other Topics Concern    Not on file   Social History Narrative    Not on file     Social Determinants of Health     Financial Resource Strain: Not on file   Food Insecurity: Not on file   Transportation Needs: Not on file   Physical Activity: Not on file   Stress: Not on file   Social Connections: Not on file   Intimate Partner Violence: Not on file   Housing Stability: Not on file          Medications  Allergies   Current Outpatient Medications   Medication Sig Dispense Refill    amLODIPine (NORVASC) 2.5 MG tablet Take 1 tablet (2.5 mg) by mouth daily 90 tablet 3    aspirin 81 MG EC tablet Take 1 tablet (81 mg) by mouth daily Start tomorrow. 30 tablet 3    atorvastatin (LIPITOR) 80 MG tablet Take 1 tablet (80 mg) by mouth daily 90 tablet 3    blood glucose (ACCU-CHEK GUIDE) test strip TEST BLOOD GLUCOSE TWICE DAILY 100 strip 3    blood glucose (NO BRAND SPECIFIED) test strip 1 strip by In Vitro route 2 times daily Use to test blood sugar 2 times daily or as directed. 100 strip 3    Ca Carbonate-Mag Hydroxide (ROLAIDS PO) Take 1 tablet by mouth as needed (heartburn)      insulin glargine (BASAGLAR KWIKPEN) 100 UNIT/ML pen Inject 40 Units Subcutaneous At Bedtime Will sometimes take 41 units if has extra carbs      insulin pen needle (BD PEN NEEDLE LIVAN 2ND GEN) 32G X 4 MM miscellaneous USE WITH BASAGLAR ONCE DAILY AS NEEDED 100 each 3    losartan (COZAAR) 50 MG tablet Take 1 tablet (50 mg) by mouth daily 90 tablet 3    metFORMIN (GLUCOPHAGE) 1000 MG tablet Take 1 tablet (1,000 mg) by mouth 2 times daily (with meals) Hold off on taking until tomorrow (7/23) evening 180 tablet 3    metoprolol succinate ER (TOPROL XL) 25 MG 24 hr tablet Take 1 tablet (25 mg) by mouth daily 90 tablet 3    ticagrelor (BRILINTA) 90 MG tablet Take 1 tablet (90 mg) by  mouth 2 times daily Dose to start this evening. 180 tablet 3    TRULICITY 3 MG/0.5ML SOPN Inject 3 mg Subcutaneous every 7 days 6 mL 1    UNABLE TO FIND Apply 1 Application topically as needed MEDICATION NAME: THC/CBD topical ointment to toes prn      No Known Allergies      Lab Results    Chemistry/lipid CBC Cardiac Enzymes/BNP/TSH/INR   Lab Results   Component Value Date    CHOL 138 07/21/2023    HDL 42 07/21/2023    TRIG 176 (H) 07/21/2023    BUN 9.9 07/22/2023     07/22/2023    CO2 25 07/22/2023    Lab Results   Component Value Date    WBC 7.0 07/19/2023    HGB 14.1 07/19/2023    HCT 42.2 07/19/2023    MCV 92 07/19/2023     07/19/2023    No results found for: CKTOTAL, CKMB, TROPONINI, BNP, TSH, INR     40  minutes spent on the date of encounter doing chart review, review of test results, interpretation with above tests, patient visit, and documentation.        This note has been dictated using voice recognition software. Any grammatical, typographical, or context distortions are unintentional and inherent to the software

## 2023-08-08 NOTE — LETTER
8/8/2023    Robin Colby MD  [address] [facility] [address]    RE: Constantin Pa       Dear Colleague,     I had the pleasure of seeing Constantin Pa in the [facility].          Assessment/Recommendations   Assessment:    1.  Coronary artery disease: Patient saw Dr. Aj on 7/19/2023 for progressive angina.  History of known coronary disease with status post prior stent to proximal LAD in 2004: Coronary angiogram on 7/21/2020 showed 95% stenosis in proximal RCA which was successfully treated with a drug-eluting stent.  Previous stent in proximal RC LAD was found patent.  Noted 50% narrowing at the takeoff of the diagonal 1 with DFR of 0.93 and 50% ostial narrowing of mid LAD with DFR of 0.93.    Shortness of breath and chest discomfort has resolved since stent placement.    On dual antiplatelet therapy with ASA 81 mg indefinitely and Ticagrelor (Brilinta) 90 mg twice a day for 12 months.  Dr. Tellez recommended to stay on aspirin and clopidogrel indefinitely after 1 year.      Cardiac rehab has been initiated at [facility] cardiac rehab.    2.  Dyslipidemia with LDL goal <70/Obesity with a BMI of: Constantin Pa is on high intensity statin therapy with atorvastatin 80 mg daily. Most recent LDL is 61 and triglycerides 176.      3.  Hypertension: His blood pressure is stable at 106/52. Currently on metoprolol succinate 25 mg daily and losartan.    4.  Diabetes: Most recent A1C is 8.5.      Plan:  - We discussed the importance of antiplatelet therapy and talking with his cardiologist prior to stopping these medications for any reason.      - Encouraged to seek medical attention if recurrent chest pain or shortness of breath.    - Risk factor modification and lifestyle management topics were discussed including managing comorbidities, weight loss, heart healthy diet, exercise, stress reduction, and alcohol use.      - Cardiac rehab as scheduled/    - We discussed a diet low in  saturated fat, weight loss, and exercise along with medication for better control of cholesterol.  Highly encouraged to participate in nutrition class in cardiac rehab.    - Continue current hypertension regimen    - We discussed the importance of tightly controlled blood sugars to minimize risk of worsening coronary artery disease. Defer to PCP for diabetes managment.      Follow up with Dr. Aj as scheduled in September      History of Present Illness/Subjective    Mr. Constantin Pa is a 72 year old male with a past medical history of coronary disease prior stent to proximal LAD in 2004, times hypertension, hyperlipidemia, diabetes mellitus, recent issue with accelerated angina and now coronary disease with status post PCI/SERINA to proximal RCA who is seen at [facility] for post coronary intervention follow up.     Most recent ECHO/Stress test showed an EF of preserved LVEF 55 to 60% with grade 1 or early diastolic dysfunction.  No significant valve disease noted..     Today, Constantin  denies fatigue, lightheadedness, shortness of breath, dyspnea on exertion, orthopnea, PND, palpitations, chest pain, abdominal fullness/bloating, and lower extremity edema.  Occasionally he has to catch his breath.  He denies any bleeding complications.  He is in cardiac rehab.    Coronary Angiogram: reviewed:  Conclusion    Constantin Pa is a 72 year old old male with CAD s/p prior stent to pLAD '04, HTN, HL, DM who is here for w/up of accelerated angina.     - severe pRCA narrowing s/p DESx1 to pRCA, dFR-negative disease in mLAD/D1  - ASA 81mg daily indefinitely, ticagrelor 180mg once, followed by 90mg twice daily for at least 12 months. Ideally, would switch to ASA/clopidogrel after 1 yhear and continue DAPT indefinitely  - switch statin to atorva 80  - continue aggressive risk factor modification        Findings:  LM:no obstruction  LAD:patent proximal stent, mid-vessel 50% narrowing at the take  off of a D1, which in turn has 50% ostial narrowing. DFR 0.93 in mLAD, 0.93 in D1  Lcx:large RI/OM1 w/ no obstruction, otherwise mildly irregular  RCA:dominant, proximal 95% narrowing     Access:  R Radial artery     PCI:  RCA was engaged w/ a 6F JR4 Guide catheter and the lesion in RCA was wired w/ a Forte wire (w/ 6F Guideline support), ballooned w/ a 2.5x12mm Emerge, stented w/ a 3.0x20mm Synergy EES at 11 rosalinda, and post-dilated w/ a 4.0x12mm NC Emerge at 4-12 rosalinda inflations. Final angiography showed no dissection or perforation and a ANNA 3 flow.     Closure:   Vasc Band      ECHO from 7/21/23-Reviewed:   Interpretation Summary     Left ventricular size, wall motion and function are normal. The ejection  fraction is 55-60%.  There is mild concentric left ventricular hypertrophy.  Grade I or early diastolic dysfunction.  Normal right ventricle size and systolic function.  The left atrium is mildly dilated.  No significant valvular disease.     No previous study for comparison.       Physical Examination Review of Systems   /52 (BP Location: Right arm, Patient Position: Sitting, Cuff Size: Adult Large)   Pulse 68   Resp 18   Wt 103.9 kg (229 lb)   SpO2 97%   BMI 31.94 kg/m    Body mass index is 31.94 kg/m .  Wt Readings from Last 3 Encounters:   08/08/23 103.9 kg (229 lb)   08/01/23 103.8 kg (228 lb 12.8 oz)   07/22/23 103.1 kg (227 lb 3.2 oz)     General Appearance:   no distress, normal body habitus   ENT/Mouth: membranes moist, no oral lesions or bleeding gums.      EYES:  no scleral icterus, normal conjunctivae   Neck: no carotid bruits or thyromegaly   Chest/Lungs:   lungs are clear to auscultation, no rales or wheezing, equal chest wall expansion    Cardiovascular:   Heart rate regular. Normal first and second heart sounds with no murmurs, rubs, or gallops; the carotid, radial and posterior tibial pulses are intact, Jugular venous pressure flat with no edema bilaterally    Abdomen:  no  organomegaly, masses, bruits, or tenderness; bowel sounds are present   Extremities  Puncture Site: no cyanosis or clubbing  Right radial site is soft with no bruising.  Radial pulses and Pedal pulses intact and symmetrical.  CMS intact.   Skin: no xanthelasma, warm.    Neurologic: normal  bilateral, no tremors     Psychiatric: alert and oriented x3, calm                                                        Negative unless noted in HPI     Medical History  Surgical History Family History Social History   Past Medical History:   Diagnosis Date    CAD (coronary artery disease)     Past Surgical History:   Procedure Laterality Date    ANGIOPLASTY  12/2004    APPENDECTOMY  01/15/2016    ARTHROPLASTY KNEE  09/16/2014    ARTHROPLASTY KNEE  12/20/2013    COLONOSCOPY  03/23/2021    Tubular adenoma x3 size 3-11mm; repeat in 3 yrs    COLONOSCOPY  01/22/2018    COLONOSCOPY  09/19/2016    COLONOSCOPY  12/09/2015    COLONOSCOPY  07/2009    COLONOSCOPY  02/03/2006    CORONARY STENT PLACEMENT N/A x1    10 years ago    CV CORONARY ANGIOGRAM N/A 7/21/2023    Procedure: Coronary Angiogram;  Surgeon: Landon Tellez MD;  Location: [facility] CATH LAB CV    CV FRACTIONAL FLOW RATIO WIRE N/A 7/21/2023    Procedure: Fractional Flow Ratio Wire;  Surgeon: Landon Tellez MD;  Location: [facility] LAB CV    CV PCI N/A 7/21/2023    Procedure: Percutaneous Coronary Intervention;  Surgeon: Landon Tellez MD;  Location: [facility] LAB CV    FLEXIBLE SIGMOIDOSCOPY  12/16/2005    JOINT REPLACEMENT Right     knee    LITHOTRIPSY  1996    OTHER SURGICAL HISTORY      lithotrypsy    Shoulder Class 4 Separation  1978    SHOULDER SURGERY      STRESS TEST  02/15/2007    STRESS TEST  07/18/2013    ECG treadmill    TONSILLECTOMY & ADENOIDECTOMY  1954    Family History   Adopted: Yes    Social History     Socioeconomic History    Marital status:      Spouse name: Not on file    Number of children: Not on file    Years of  education: Not on file    Highest education level: Not on file   Occupational History    Not on file   Tobacco Use    Smoking status: Former     Passive exposure: Past    Smokeless tobacco: Never   Vaping Use    Vaping Use: Never used   Substance and Sexual Activity    Alcohol use: Yes     Alcohol/week: 0.0 standard drinks of alcohol    Drug use: Not on file    Sexual activity: Not on file   Other Topics Concern    Not on file   Social History Narrative    Not on file     Social Determinants of Health     Financial Resource Strain: Not on file   Food Insecurity: Not on file   Transportation Needs: Not on file   Physical Activity: Not on file   Stress: Not on file   Social Connections: Not on file   Intimate Partner Violence: Not on file   Housing Stability: Not on file          Medications  Allergies   Current Outpatient Medications   Medication Sig Dispense Refill    amLODIPine (NORVASC) 2.5 MG tablet Take 1 tablet (2.5 mg) by mouth daily 90 tablet 3    aspirin 81 MG EC tablet Take 1 tablet (81 mg) by mouth daily Start tomorrow. 30 tablet 3    atorvastatin (LIPITOR) 80 MG tablet Take 1 tablet (80 mg) by mouth daily 90 tablet 3    blood glucose (ACCU-CHEK GUIDE) test strip TEST BLOOD GLUCOSE TWICE DAILY 100 strip 3    blood glucose (NO BRAND SPECIFIED) test strip 1 strip by In Vitro route 2 times daily Use to test blood sugar 2 times daily or as directed. 100 strip 3    Ca Carbonate-Mag Hydroxide (ROLAIDS PO) Take 1 tablet by mouth as needed (heartburn)      insulin glargine (BASAGLAR KWIKPEN) 100 UNIT/ML pen Inject 40 Units Subcutaneous At Bedtime Will sometimes take 41 units if has extra carbs      insulin pen needle (BD PEN NEEDLE LIVAN 2ND GEN) 32G X 4 MM miscellaneous USE WITH BASAGLAR ONCE DAILY AS NEEDED 100 each 3    losartan (COZAAR) 50 MG tablet Take 1 tablet (50 mg) by mouth daily 90 tablet 3    metFORMIN (GLUCOPHAGE) 1000 MG tablet Take 1 tablet (1,000 mg) by mouth 2 times daily (with meals) Hold off on  taking until tomorrow (7/23) evening 180 tablet 3    metoprolol succinate ER (TOPROL XL) 25 MG 24 hr tablet Take 1 tablet (25 mg) by mouth daily 90 tablet 3    ticagrelor (BRILINTA) 90 MG tablet Take 1 tablet (90 mg) by mouth 2 times daily Dose to start this evening. 180 tablet 3    TRULICITY 3 MG/0.5ML SOPN Inject 3 mg Subcutaneous every 7 days 6 mL 1    UNABLE TO FIND Apply 1 Application topically as needed MEDICATION NAME: THC/CBD topical ointment to toes prn      No Known Allergies      Lab Results    Chemistry/lipid CBC Cardiac Enzymes/BNP/TSH/INR   Lab Results   Component Value Date    CHOL 138 07/21/2023    HDL 42 07/21/2023    TRIG 176 (H) 07/21/2023    BUN 9.9 07/22/2023     07/22/2023    CO2 25 07/22/2023    Lab Results   Component Value Date    WBC 7.0 07/19/2023    HGB 14.1 07/19/2023    HCT 42.2 07/19/2023    MCV 92 07/19/2023     07/19/2023    No results found for: CKTOTAL, CKMB, TROPONINI, BNP, TSH, INR     40  minutes spent on the date of encounter doing chart review, review of test results, interpretation with above tests, patient visit, and documentation.        This note has been dictated using voice recognition software. Any grammatical, typographical, or context distortions are unintentional and inherent to the software          Thank you for allowing me to participate in the care of your patient.      Sincerely,     MELODY [facility]  cc:   Lei Hartmann MD  [address], [address]  [place] [address]

## 2023-08-31 DIAGNOSIS — E11.9 TYPE 2 DIABETES MELLITUS WITHOUT COMPLICATION, WITH LONG-TERM CURRENT USE OF INSULIN (H): ICD-10-CM

## 2023-08-31 DIAGNOSIS — Z79.4 TYPE 2 DIABETES MELLITUS WITHOUT COMPLICATION, WITH LONG-TERM CURRENT USE OF INSULIN (H): ICD-10-CM

## 2023-09-01 RX ORDER — INSULIN GLARGINE 100 [IU]/ML
INJECTION, SOLUTION SUBCUTANEOUS
Qty: 30 ML | Refills: 1 | Status: SHIPPED | OUTPATIENT
Start: 2023-09-01 | End: 2023-12-29

## 2023-09-01 RX ORDER — PEN NEEDLE, DIABETIC 32GX 5/32"
NEEDLE, DISPOSABLE MISCELLANEOUS
Qty: 100 EACH | Refills: 1 | Status: SHIPPED | OUTPATIENT
Start: 2023-09-01 | End: 2024-04-01

## 2023-09-01 NOTE — TELEPHONE ENCOUNTER
"Routing refill request to provider for review/approval because:  Medication is reported/historical   dosing    Insulin glargine  Last Written Prescription Date:  7/21/23  Last Fill Quantity: n/a,  # refills: n/a     Pen needles  Last Written Prescription Date:  8/15/22  Last Fill Quantity: 100,  # refills: 3     Last office visit provider:  8/1/23     Requested Prescriptions   Pending Prescriptions Disp Refills    insulin pen needle (BD PEN NEEDLE LIVAN 2ND GEN) 32G X 4 MM miscellaneous [Pharmacy Med Name: B-D LIVAN 2ND GEN PEN NDL [id number]]       Sig: USE WITH BASAGLAR ONEC DAILY AS NEEDED       Diabetic Supplies Protocol Passed - 8/31/2023  7:31 PM        Passed - Medication is active on med list        Passed - Patient is 18 years of age or older        Passed - Recent (6 mo) or future (30 days) visit within the authorizing provider's specialty     Patient had office visit in the last 6 months or has a visit in the next 30 days with authorizing provider.  See \"Patient Info\" tab in inbasket, or \"Choose Columns\" in Meds & Orders section of the refill encounter.              insulin glargine (BASAGLAR KWIKPEN) 100 UNIT/ML pen [Pharmacy Med Name: BASAGLAR 100 U/ML KWIKPEN INJ 3ML] 30 mL      Sig: ADMINISTER 34 UNITS UNDER THE SKIN DAILY       Long Acting Insulin Protocol Passed - 8/31/2023  7:31 PM        Passed - Serum creatinine on file in past 12 months     Recent Labs   Lab Test 07/22/23  0535   CR 0.89       Ok to refill medication if creatinine is low          Passed - HgbA1C in past 3 or 6 months     If HgbA1C is 8 or greater, it needs to be on file within the past 3 months.  If less than 8, must be on file within the past 6 months.     Recent Labs   Lab Test 07/05/23  0946   A1C 8.5*             Passed - Medication is active on med list        Passed - Patient is age 18 or older        Passed - Recent (6 mo) or future (30 days) visit within the authorizing provider's specialty     Patient had office visit in " "the last 6 months or has a visit in the next 30 days with authorizing provider or within the authorizing provider's specialty.  See \"Patient Info\" tab in inbasket, or \"Choose Columns\" in Meds & Orders section of the refill encounter.                 ABBI REED RN 09/01/23 9:41 AM  "

## 2023-09-19 ENCOUNTER — APPOINTMENT (OUTPATIENT)
Dept: AUDIOLOGY | Facility: CLINIC | Age: 73
End: 2023-09-19
Payer: MEDICARE

## 2023-09-19 ENCOUNTER — OFFICE VISIT (OUTPATIENT)
Dept: CARDIOLOGY | Facility: CLINIC | Age: 73
End: 2023-09-19
Payer: MEDICARE

## 2023-09-19 VITALS
BODY MASS INDEX: 32.47 KG/M2 | SYSTOLIC BLOOD PRESSURE: 116 MMHG | HEIGHT: 71 IN | HEART RATE: 52 BPM | WEIGHT: 231.9 LBS | RESPIRATION RATE: 16 BRPM | DIASTOLIC BLOOD PRESSURE: 56 MMHG

## 2023-09-19 DIAGNOSIS — T88.7XXA MEDICATION SIDE EFFECTS: ICD-10-CM

## 2023-09-19 DIAGNOSIS — E11.9 TYPE 2 DIABETES MELLITUS WITHOUT COMPLICATION, WITHOUT LONG-TERM CURRENT USE OF INSULIN (H): Chronic | ICD-10-CM

## 2023-09-19 DIAGNOSIS — Z98.61 PERCUTANEOUS TRANSLUMINAL CORONARY ANGIOPLASTY STATUS: ICD-10-CM

## 2023-09-19 DIAGNOSIS — R06.09 DOE (DYSPNEA ON EXERTION): ICD-10-CM

## 2023-09-19 DIAGNOSIS — I25.10 CORONARY ARTERY DISEASE INVOLVING NATIVE CORONARY ARTERY OF NATIVE HEART WITHOUT ANGINA PECTORIS: Primary | ICD-10-CM

## 2023-09-19 DIAGNOSIS — E78.2 MIXED HYPERLIPIDEMIA: ICD-10-CM

## 2023-09-19 PROCEDURE — 99214 OFFICE O/P EST MOD 30 MIN: CPT | Performed by: INTERNAL MEDICINE

## 2023-09-19 RX ORDER — CLOPIDOGREL BISULFATE 75 MG/1
75 TABLET ORAL DAILY
Qty: 90 TABLET | Refills: 3 | Status: SHIPPED | OUTPATIENT
Start: 2023-09-19 | End: 2024-08-27

## 2023-09-19 NOTE — CONFIDENTIAL NOTE
HEART CARE ENCOUNTER CONSULTATON NOTE      [facility]  [phone]      Assessment/Recommendations   Assessment:   1. Progressive angina (resolved), s/p recent PCI of RCA (July 2023)   2. Coronary Artery disease. 2004.  Proximal LAD stent (3.0 x13 mm Cypher stent, [facility])  3. Sinus rhythm with PAC (blocking sinus node) resulting is bradycardia.   4. First degree AVB   5. DM type II.   Lab Results   Component Value Date    A1C 8.5 07/05/2023    A1C 8.2 02/28/2023    A1C 8.0 07/26/2022    A1C 7.7 03/28/2022    A1C 8.9 12/29/2021   6. HLD    Plan:   Discontinue Brilinta due to episodic episodes of dyspnea.  We will start Plavix at 75 mg daily.  We will load 300 mg on day 1.  2.  Aspirin 81 mg daily   3. Atorvastatin 80 mg daily, may need to reduce if ongoing diarrhea issues  4. Continue metoprolol  5.  Discontinue amlodipine due to lightheadedness with standing  6.  Continue cardiac rehab       History of Present Illness/Subjective    HPI: Constantin Pa is a 72 year old male coronary disease, hypertension, hyperlipidemia presents to cardiology clinic  in follow-up for recent PCI and angina    Who presents after recent progressive anginal episode who underwent coronary angiogram on July 21, 2023 demonstrated severe disease in the proximal right coronary artery underwent successful percutaneous coronary invention without complications.  Since undergoing his procedure he has noted resolution of his dyspnea on exertion and chest pain symptoms.    Currently he is experiencing side effects to antihypertensives including lightheadedness with standing.  Will discontinue amlodipine.  We will continue metoprolol and losartan.  He also has noticed episodes of resting dyspnea.  He feels intermittently has to take a deep breath which is likely a side effect of Brilinta and will switch to Plavix.  He was previously on Plavix in the past and tolerated well.    Symptom occasional episodes of diarrhea.   "This could be related to his high-dose statin.  We will stop amlodipine in the setting of high-dose atorvastatin to see if this helps with symptoms.    Coronary Angiogram: 7/21/2023  Findings:  LM:no obstruction  LAD:patent proximal stent, mid-vessel 50% narrowing at the take off of a D1, which in turn has 50% ostial narrowing. DFR 0.93 in mLAD, 0.93 in D1  Lcx:large RI/OM1 w/ no obstruction, otherwise mildly irregular  RCA:dominant, proximal 95% narrowing     Access:  R Radial artery     PCI:  RCA was engaged w/ a 6F JR4 Guide catheter and the lesion in RCA was wired w/ a Forte wire (w/ 6F Guideline support), ballooned w/ a 2.5x12mm Emerge, stented w/ a 3.0x20mm Synergy EES at 11 rosalinda, and post-dilated w/ a 4.0x12mm NC Emerge at 4-12 rosalinda inflations. Final angiography showed no dissection or perforation and a ANNA 3 flow.    ECHO: 2023  Left ventricular size, wall motion and function are normal. The ejection  fraction is 55-60%.  There is mild concentric left ventricular hypertrophy.  Grade I or early diastolic dysfunction.  Normal right ventricle size and systolic function.  The left atrium is mildly dilated.  No significant valvular disease.     Physical Examination  Review of Systems   Vitals: /56 (BP Location: Right arm, Patient Position: Sitting, Cuff Size: Adult Large)   Pulse 52   Resp 16   Ht 1.803 m (5' 11\")   Wt 105.2 kg (231 lb 14.4 oz)   BMI 32.34 kg/m    BMI= Body mass index is 32.34 kg/m .  Wt Readings from Last 3 Encounters:   09/19/23 105.2 kg (231 lb 14.4 oz)   08/08/23 103.9 kg (229 lb)   08/01/23 103.8 kg (228 lb 12.8 oz)        Pleasant   ENT/Mouth: membranes moist, no oral lesions or bleeding gums.      EYES:  no scleral icterus, normal conjunctivae       Chest/Lungs:   lungs are clear to auscultation, no rales or wheezing, no sternal scar, equal chest wall expansion    Cardiovascular:    Normal first and second heart sounds with faint systolic murmurs, rubs, or gallops; the carotid, " radial and posterior tibial pulses are intact, Jugular venous pressure normal, no pitting edema bilaterally. No change.    Abdomen:  no tenderness; bowel sounds are present   Extremities: no cyanosis or clubbing   Skin: no xanthelasma, warm.    Neurologic: normal  bilateral, no tremors     Psychiatric: alert and oriented x3, calm        Please refer above for cardiac ROS details.        Medical History  Surgical History Family History Social History   Past Medical History:   Diagnosis Date    CAD (coronary artery disease)      Past Surgical History:   Procedure Laterality Date    ANGIOPLASTY  12/2004    APPENDECTOMY  01/15/2016    ARTHROPLASTY KNEE  09/16/2014    ARTHROPLASTY KNEE  12/20/2013    COLONOSCOPY  03/23/2021    Tubular adenoma x3 size 3-11mm; repeat in 3 yrs    COLONOSCOPY  01/22/2018    COLONOSCOPY  09/19/2016    COLONOSCOPY  12/09/2015    COLONOSCOPY  07/2009    COLONOSCOPY  02/03/2006    CORONARY STENT PLACEMENT N/A x1    10 years ago    CV CORONARY ANGIOGRAM N/A 7/21/2023    Procedure: Coronary Angiogram;  Surgeon: Landon Tellez MD;  Location: [facility] CATH LAB CV    CV FRACTIONAL FLOW RATIO WIRE N/A 7/21/2023    Procedure: Fractional Flow Ratio Wire;  Surgeon: Landon Tellez MD;  Location: [facility] CATH LAB CV    CV PCI N/A 7/21/2023    Procedure: Percutaneous Coronary Intervention;  Surgeon: Landon Tellez MD;  Location: [facility] CATH LAB CV    FLEXIBLE SIGMOIDOSCOPY  12/16/2005    JOINT REPLACEMENT Right     knee    LITHOTRIPSY  1996    OTHER SURGICAL HISTORY      lithotrypsy    Shoulder Class 4 Separation  1978    SHOULDER SURGERY      STRESS TEST  02/15/2007    STRESS TEST  07/18/2013    ECG treadmill    TONSILLECTOMY & ADENOIDECTOMY  1954     Family History   Adopted: Yes        Social History     Socioeconomic History    Marital status:      Spouse name: Not on file    Number of children: Not on file    Years of education: Not on file    Highest education level: Not on  file   Occupational History    Not on file   Tobacco Use    Smoking status: Former     Passive exposure: Past    Smokeless tobacco: Never   Vaping Use    Vaping Use: Never used   Substance and Sexual Activity    Alcohol use: Yes     Alcohol/week: 0.0 standard drinks of alcohol    Drug use: Not on file    Sexual activity: Not on file   Other Topics Concern    Not on file   Social History Narrative    Not on file     Social Determinants of Health     Financial Resource Strain: Not on file   Food Insecurity: Not on file   Transportation Needs: Not on file   Physical Activity: Not on file   Stress: Not on file   Social Connections: Not on file   Intimate Partner Violence: Not on file   Housing Stability: Not on file           Medications  Allergies   Current Outpatient Medications   Medication Sig Dispense Refill    amLODIPine (NORVASC) 2.5 MG tablet Take 1 tablet (2.5 mg) by mouth daily 90 tablet 3    aspirin 81 MG EC tablet Take 1 tablet (81 mg) by mouth daily Start tomorrow. 30 tablet 3    atorvastatin (LIPITOR) 80 MG tablet Take 1 tablet (80 mg) by mouth daily 90 tablet 3    blood glucose (ACCU-CHEK GUIDE) test strip TEST BLOOD GLUCOSE TWICE DAILY 100 strip 3    blood glucose (NO BRAND SPECIFIED) test strip 1 strip by In Vitro route 2 times daily Use to test blood sugar 2 times daily or as directed. 100 strip 3    Ca Carbonate-Mag Hydroxide (ROLAIDS PO) Take 1 tablet by mouth as needed (heartburn)      insulin glargine (BASAGLAR KWIKPEN) 100 UNIT/ML pen ADMINISTER 34 UNITS UNDER THE SKIN DAILY 30 mL 1    insulin pen needle (BD PEN NEEDLE LIVAN 2ND GEN) 32G X 4 MM miscellaneous USE WITH BASAGLAR ONEC DAILY AS NEEDED 100 each 1    losartan (COZAAR) 50 MG tablet Take 1 tablet (50 mg) by mouth daily 90 tablet 3    metFORMIN (GLUCOPHAGE) 1000 MG tablet Take 1 tablet (1,000 mg) by mouth 2 times daily (with meals) Hold off on taking until tomorrow (7/23) evening 180 tablet 3    metoprolol succinate ER (TOPROL XL) 25 MG 24  hr tablet Take 1 tablet (25 mg) by mouth daily 90 tablet 3    ticagrelor (BRILINTA) 90 MG tablet Take 1 tablet (90 mg) by mouth 2 times daily Dose to start this evening. 180 tablet 3    TRULICITY 3 MG/0.5ML SOPN Inject 3 mg Subcutaneous every 7 days 6 mL 1    UNABLE TO FIND Apply 1 Application topically as needed MEDICATION NAME: THC/CBD topical ointment to toes prn       No Known Allergies       Lab Results    Chemistry/lipid CBC Cardiac Enzymes/BNP/TSH/INR   Recent Labs   Lab Test 07/26/22  0854 12/16/20  0920   CHOL 153 151   HDL 49 48   LDL 74 76   TRIG 149 171*   CHOLHDLRATIO  --  3.1     Recent Labs   Lab Test 07/26/22  0854 12/16/20  0920 11/19/19  0756   LDL 74 76 72     Recent Labs   Lab Test 07/05/23  0946      POTASSIUM 4.6   CHLORIDE 101   CO2 26   *   BUN 14.9   CR 0.97   GFRESTIMATED 82   YOMAIRA 9.8     Recent Labs   Lab Test 07/05/23  0946 03/28/22  0902 07/21/21  1228   CR 0.97 0.88 1.03     Recent Labs   Lab Test 07/05/23  0946 02/28/23  0755 07/26/22  0854   A1C 8.5* 8.2* 8.0*          Recent Labs   Lab Test 07/05/23  0946   WBC 7.5   HGB 14.6   HCT 43.4   MCV 91        Recent Labs   Lab Test 07/05/23  0946 03/28/22  0902   HGB 14.6 14.7    No results for input(s): TROPONINI in the last 22052 hours.  No results for input(s): BNP, NTBNPI, NTBNP in the last 30555 hours.  No results for input(s): TSH in the last 43361 hours.  No results for input(s): INR in the last 78203 hours.     Lenny Aj DO

## 2023-09-19 NOTE — LETTER
9/19/2023    Robin Colby MD  [address] [address]    RE: Constantin Pa       Dear Colleague,     I had the pleasure of seeing Constantin Pa in the [facility].  No notes on file    Thank you for allowing me to participate in the care of your patient.      Sincerely,     Lenny Aj DO     [facility]  cc:   Lei Hartmann MD  [address], [address]  [place] [id number]

## 2023-09-19 NOTE — PATIENT INSTRUCTIONS
Please contact direct nurses line Monday through Friday 8 AM to 5 PM @ [phone]    After-hours contact cardiology office at [phone].      Plan:   Stop Brillinta, start clopidogrel 75 mg daily.  Take four (4) 75 mg pills in AM of first day, then one (1) 75 mg daily.    Stop amlodipine   If ongoing GI symptoms will need to reduce atorvastatin to 40 mg daily

## 2023-10-20 DIAGNOSIS — E11.9 TYPE 2 DIABETES MELLITUS WITHOUT COMPLICATION, WITH LONG-TERM CURRENT USE OF INSULIN (H): ICD-10-CM

## 2023-10-20 DIAGNOSIS — Z79.4 TYPE 2 DIABETES MELLITUS WITHOUT COMPLICATION, WITH LONG-TERM CURRENT USE OF INSULIN (H): ICD-10-CM

## 2023-10-20 RX ORDER — DULAGLUTIDE 3 MG/.5ML
INJECTION, SOLUTION SUBCUTANEOUS
Qty: 6 ML | Refills: 0 | Status: SHIPPED | OUTPATIENT
Start: 2023-10-20 | End: 2023-12-29

## 2023-10-22 ENCOUNTER — HEALTH MAINTENANCE LETTER (OUTPATIENT)
Age: 73
End: 2023-10-22

## 2023-12-28 DIAGNOSIS — Z79.4 TYPE 2 DIABETES MELLITUS WITHOUT COMPLICATION, WITH LONG-TERM CURRENT USE OF INSULIN (H): ICD-10-CM

## 2023-12-28 DIAGNOSIS — E11.9 TYPE 2 DIABETES MELLITUS WITHOUT COMPLICATION, WITH LONG-TERM CURRENT USE OF INSULIN (H): ICD-10-CM

## 2023-12-29 ENCOUNTER — LAB (OUTPATIENT)
Dept: LAB | Facility: CLINIC | Age: 73
End: 2023-12-29
Payer: MEDICARE

## 2023-12-29 DIAGNOSIS — Z79.84 DIABETES MELLITUS TREATED WITH ORAL MEDICATION (H): ICD-10-CM

## 2023-12-29 DIAGNOSIS — Z11.59 NEED FOR HEPATITIS C SCREENING TEST: Primary | ICD-10-CM

## 2023-12-29 DIAGNOSIS — R06.09 DOE (DYSPNEA ON EXERTION): ICD-10-CM

## 2023-12-29 DIAGNOSIS — E11.9 DIABETES MELLITUS TREATED WITH ORAL MEDICATION (H): ICD-10-CM

## 2023-12-29 LAB
CHOLEST SERPL-MCNC: 135 MG/DL
FASTING STATUS PATIENT QL REPORTED: YES
HBA1C MFR BLD: 8.3 % (ref 0–5.6)
HCV AB SERPL QL IA: NONREACTIVE
HDLC SERPL-MCNC: 40 MG/DL
LDLC SERPL CALC-MCNC: 64 MG/DL
NONHDLC SERPL-MCNC: 95 MG/DL
TRIGL SERPL-MCNC: 156 MG/DL

## 2023-12-29 PROCEDURE — 36415 COLL VENOUS BLD VENIPUNCTURE: CPT

## 2023-12-29 PROCEDURE — 86803 HEPATITIS C AB TEST: CPT

## 2023-12-29 PROCEDURE — 80061 LIPID PANEL: CPT

## 2023-12-29 PROCEDURE — 83036 HEMOGLOBIN GLYCOSYLATED A1C: CPT

## 2023-12-29 RX ORDER — DULAGLUTIDE 3 MG/.5ML
3 INJECTION, SOLUTION SUBCUTANEOUS
Qty: 6 ML | Refills: 0 | Status: SHIPPED | OUTPATIENT
Start: 2023-12-29 | End: 2024-07-01

## 2023-12-29 RX ORDER — INSULIN GLARGINE 100 [IU]/ML
INJECTION, SOLUTION SUBCUTANEOUS
Qty: 27 ML | Refills: 3 | Status: SHIPPED | OUTPATIENT
Start: 2023-12-29 | End: 2024-07-09

## 2024-01-02 ENCOUNTER — MYC MEDICAL ADVICE (OUTPATIENT)
Dept: FAMILY MEDICINE | Facility: CLINIC | Age: 74
End: 2024-01-02
Payer: MEDICARE

## 2024-03-23 DIAGNOSIS — R06.09 DOE (DYSPNEA ON EXERTION): ICD-10-CM

## 2024-03-25 RX ORDER — ATORVASTATIN CALCIUM 80 MG/1
80 TABLET, FILM COATED ORAL DAILY
Qty: 90 TABLET | Refills: 1 | Status: SHIPPED | OUTPATIENT
Start: 2024-03-25 | End: 2024-09-20

## 2024-03-29 DIAGNOSIS — E11.9 TYPE 2 DIABETES MELLITUS WITHOUT COMPLICATION, WITH LONG-TERM CURRENT USE OF INSULIN (H): ICD-10-CM

## 2024-03-29 DIAGNOSIS — Z79.4 TYPE 2 DIABETES MELLITUS WITHOUT COMPLICATION, WITH LONG-TERM CURRENT USE OF INSULIN (H): ICD-10-CM

## 2024-04-01 RX ORDER — PEN NEEDLE, DIABETIC 32GX 5/32"
NEEDLE, DISPOSABLE MISCELLANEOUS
Qty: 100 EACH | Refills: 11 | Status: SHIPPED | OUTPATIENT
Start: 2024-04-01

## 2024-05-18 ENCOUNTER — HEALTH MAINTENANCE LETTER (OUTPATIENT)
Age: 74
End: 2024-05-18

## 2024-06-21 DIAGNOSIS — Z98.61 PERCUTANEOUS TRANSLUMINAL CORONARY ANGIOPLASTY STATUS: ICD-10-CM

## 2024-06-23 RX ORDER — LOSARTAN POTASSIUM 50 MG/1
50 TABLET ORAL DAILY
Qty: 90 TABLET | Refills: 3 | Status: SHIPPED | OUTPATIENT
Start: 2024-06-23

## 2024-06-23 RX ORDER — METOPROLOL SUCCINATE 25 MG/1
25 TABLET, EXTENDED RELEASE ORAL DAILY
Qty: 90 TABLET | Refills: 3 | Status: SHIPPED | OUTPATIENT
Start: 2024-06-23 | End: 2024-07-09

## 2024-07-01 DIAGNOSIS — Z79.4 TYPE 2 DIABETES MELLITUS WITHOUT COMPLICATION, WITH LONG-TERM CURRENT USE OF INSULIN (H): ICD-10-CM

## 2024-07-01 DIAGNOSIS — E11.9 TYPE 2 DIABETES MELLITUS WITHOUT COMPLICATION, WITH LONG-TERM CURRENT USE OF INSULIN (H): ICD-10-CM

## 2024-07-01 RX ORDER — DULAGLUTIDE 3 MG/.5ML
3 INJECTION, SOLUTION SUBCUTANEOUS
Qty: 6 ML | Refills: 0 | Status: SHIPPED | OUTPATIENT
Start: 2024-07-01 | End: 2024-07-09

## 2024-07-02 ENCOUNTER — APPOINTMENT (OUTPATIENT)
Dept: AUDIOLOGY | Facility: CLINIC | Age: 74
End: 2024-07-02
Payer: MEDICARE

## 2024-07-02 DIAGNOSIS — Z53.9 ERRONEOUS ENCOUNTER--DISREGARD: Primary | ICD-10-CM

## 2024-07-03 ENCOUNTER — LAB (OUTPATIENT)
Dept: LAB | Facility: CLINIC | Age: 74
End: 2024-07-03
Payer: MEDICARE

## 2024-07-03 DIAGNOSIS — Z79.84 DIABETES MELLITUS TREATED WITH ORAL MEDICATION (H): Primary | ICD-10-CM

## 2024-07-03 DIAGNOSIS — I10 PRIMARY HYPERTENSION: ICD-10-CM

## 2024-07-03 DIAGNOSIS — E11.9 DIABETES MELLITUS TREATED WITH ORAL MEDICATION (H): Primary | ICD-10-CM

## 2024-07-03 LAB
ANION GAP SERPL CALCULATED.3IONS-SCNC: 7 MMOL/L (ref 7–15)
BUN SERPL-MCNC: 14.4 MG/DL (ref 8–23)
CALCIUM SERPL-MCNC: 9.5 MG/DL (ref 8.8–10.2)
CHLORIDE SERPL-SCNC: 105 MMOL/L (ref 98–107)
CREAT SERPL-MCNC: 0.98 MG/DL (ref 0.67–1.17)
CREAT UR-MCNC: 104 MG/DL
DEPRECATED HCO3 PLAS-SCNC: 30 MMOL/L (ref 22–29)
EGFRCR SERPLBLD CKD-EPI 2021: 81 ML/MIN/1.73M2
GLUCOSE SERPL-MCNC: 101 MG/DL (ref 70–99)
HBA1C MFR BLD: 8.6 % (ref 0–5.6)
MICROALBUMIN UR-MCNC: <12 MG/L
MICROALBUMIN/CREAT UR: NORMAL MG/G{CREAT}
POTASSIUM SERPL-SCNC: 4.5 MMOL/L (ref 3.4–5.3)
SODIUM SERPL-SCNC: 142 MMOL/L (ref 135–145)

## 2024-07-03 PROCEDURE — 80048 BASIC METABOLIC PNL TOTAL CA: CPT

## 2024-07-03 PROCEDURE — 83036 HEMOGLOBIN GLYCOSYLATED A1C: CPT

## 2024-07-03 PROCEDURE — 36415 COLL VENOUS BLD VENIPUNCTURE: CPT

## 2024-07-03 PROCEDURE — 82570 ASSAY OF URINE CREATININE: CPT

## 2024-07-03 PROCEDURE — 82043 UR ALBUMIN QUANTITATIVE: CPT

## 2024-07-09 ENCOUNTER — OFFICE VISIT (OUTPATIENT)
Dept: FAMILY MEDICINE | Facility: CLINIC | Age: 74
End: 2024-07-09
Payer: MEDICARE

## 2024-07-09 VITALS
WEIGHT: 235.5 LBS | RESPIRATION RATE: 16 BRPM | HEART RATE: 43 BPM | DIASTOLIC BLOOD PRESSURE: 68 MMHG | BODY MASS INDEX: 32.97 KG/M2 | SYSTOLIC BLOOD PRESSURE: 123 MMHG | TEMPERATURE: 97.3 F | HEIGHT: 71 IN | OXYGEN SATURATION: 95 %

## 2024-07-09 DIAGNOSIS — Z98.61 PERCUTANEOUS TRANSLUMINAL CORONARY ANGIOPLASTY STATUS: ICD-10-CM

## 2024-07-09 DIAGNOSIS — Z79.4 TYPE 2 DIABETES MELLITUS WITHOUT COMPLICATION, WITH LONG-TERM CURRENT USE OF INSULIN (H): Primary | ICD-10-CM

## 2024-07-09 DIAGNOSIS — E11.9 TYPE 2 DIABETES MELLITUS WITHOUT COMPLICATION, WITH LONG-TERM CURRENT USE OF INSULIN (H): Primary | ICD-10-CM

## 2024-07-09 DIAGNOSIS — E78.5 DYSLIPIDEMIA: Chronic | ICD-10-CM

## 2024-07-09 DIAGNOSIS — R00.1 SINUS BRADYCARDIA: ICD-10-CM

## 2024-07-09 DIAGNOSIS — I10 PRIMARY HYPERTENSION: ICD-10-CM

## 2024-07-09 DIAGNOSIS — I25.10 CORONARY ARTERY DISEASE INVOLVING NATIVE HEART WITHOUT ANGINA PECTORIS, UNSPECIFIED VESSEL OR LESION TYPE: ICD-10-CM

## 2024-07-09 PROCEDURE — 93000 ELECTROCARDIOGRAM COMPLETE: CPT | Performed by: FAMILY MEDICINE

## 2024-07-09 PROCEDURE — 99214 OFFICE O/P EST MOD 30 MIN: CPT | Mod: 25 | Performed by: FAMILY MEDICINE

## 2024-07-09 PROCEDURE — G0009 ADMIN PNEUMOCOCCAL VACCINE: HCPCS | Performed by: FAMILY MEDICINE

## 2024-07-09 PROCEDURE — 90677 PCV20 VACCINE IM: CPT | Performed by: FAMILY MEDICINE

## 2024-07-09 RX ORDER — INSULIN GLARGINE 100 [IU]/ML
40 INJECTION, SOLUTION SUBCUTANEOUS AT BEDTIME
Status: SHIPPED
Start: 2024-07-09 | End: 2024-08-28

## 2024-07-09 RX ORDER — METOPROLOL SUCCINATE 25 MG/1
12.5 TABLET, EXTENDED RELEASE ORAL DAILY
Status: SHIPPED
Start: 2024-07-09

## 2024-07-09 RX ORDER — DULAGLUTIDE 3 MG/.5ML
3 INJECTION, SOLUTION SUBCUTANEOUS
Qty: 6 ML | Refills: 3 | Status: SHIPPED | OUTPATIENT
Start: 2024-07-09

## 2024-07-09 NOTE — PROGRESS NOTES
"  Assessment & Plan     Type 2 diabetes mellitus without complication, with long-term current use of insulin (H)  Patient will resume metformin and Trulicity.  His hemoglobin A1c target is less than 8.  Currently diabetes is not well-controlled.  - metFORMIN (GLUCOPHAGE) 1000 MG tablet; Take 1 tablet (1,000 mg) by mouth 2 times daily (with meals) Hold off on taking until tomorrow (7/23) evening  - insulin glargine 100 UNIT/ML pen; Inject 40 Units Subcutaneous at bedtime  - TRULICITY 3 MG/0.5ML SOPN; Inject 3 mg Subcutaneous every 7 days    Percutaneous transluminal coronary angioplasty status  Patient has a few more weeks left of dual antiplatelet therapy.  Follow-up with cardiology will be scheduled.  - Adult Cardiology Eval  Referral; Future  - EKG 12-lead complete w/read - Clinics  - metoprolol succinate ER (TOPROL XL) 25 MG 24 hr tablet; Take 0.5 tablets (12.5 mg) by mouth daily    Coronary artery disease involving native heart without angina pectoris, unspecified vessel or lesion type  Denies any anginal symptoms.  He is able to remain fairly active.    Sinus bradycardia  Patient shows first-degree AV block which has been present in the past as well as a very well sinus bradycardia.  He will reduce metoprolol to 12 and half milligrams daily and continue to monitor his pulse and blood pressure.    Dyslipidemia  Controlled, continue his current medication    Primary hypertension  Currently under control.  Continue to monitor blood pressure with reduction in metoprolol dose.      The longitudinal plan of care for the diagnosis(es)/condition(s) as documented were addressed during this visit. Due to the added complexity in care, I will continue to support Constantin in the subsequent management and with ongoing continuity of care.      BMI  Estimated body mass index is 32.85 kg/m  as calculated from the following:    Height as of this encounter: 1.803 m (5' 11\").    Weight as of this encounter: 106.8 kg (235 lb " 8 oz).             Christopher Killian is a 73 year old, presenting for the following health issues:  Diabetes (Diabetes follow up, review meds)      7/9/2024     9:14 AM   Additional Questions   Roomed by Naina RAPP CMA   Accompanied by Self     History of Present Illness       Diabetes:   He presents for follow up of diabetes.  He is checking home blood glucose a few times a month.   He checks blood glucose before meals.  Blood glucose is never over 200 and never under 70.  When his blood glucose is low, the patient is asymptomatic for confusion, blurred vision, lethargy and reports not feeling dizzy, shaky, or weak.  He is concerned about other.   He is having numbness in feet and blurry vision.            He eats 2-3 servings of fruits and vegetables daily.He consumes 0 sweetened beverage(s) daily.He exercises with enough effort to increase his heart rate 20 to 29 minutes per day.  He exercises with enough effort to increase his heart rate 3 or less days per week.   He is taking medications regularly.     Is here to follow-up on his chronic medical problems.  He had a recent hemoglobin A1c above 8.  He has not been using dulaglutide nor metformin.  It is unclear why these were discontinued.  He has been using 40 units of insulin glargine.  He denies hypoglycemia.  His blood glucose average is in the 130s to 140 range.  Coronary disease has been well-controlled since his stent.  He has been on dual antiplatelet therapy since July of last year.  He is doing well with medication.  He notes some bradycardia.  He denies that he had 1 episode of syncope a couple months ago.  Hypertension and hyperlipidemia well-controlled.          Review of Systems  Constitutional, HEENT, cardiovascular, pulmonary, GI, , musculoskeletal, neuro, skin, endocrine and psych systems are negative, except as otherwise noted.      Objective    /68 (BP Location: Right arm, Patient Position: Sitting, Cuff Size: Adult Large)   Pulse (!) 43   " Temp 97.3  F (36.3  C) (Tympanic)   Resp 16   Ht 1.803 m (5' 11\")   Wt 106.8 kg (235 lb 8 oz)   SpO2 95%   BMI 32.85 kg/m    Body mass index is 32.85 kg/m .  Physical Exam   Alert, oriented, no acute distress  Neck is supple without adenopathy, no carotid bruit  Heart is bradycardic and irregular  Lungs are clear    EKG - Reviewed and interpreted by me sinus bradycardia, first-degree AV block, variable rate        Signed Electronically by: Robin Colby MD    "

## 2024-08-27 DIAGNOSIS — T88.7XXA MEDICATION SIDE EFFECTS: ICD-10-CM

## 2024-08-27 DIAGNOSIS — R06.09 DOE (DYSPNEA ON EXERTION): ICD-10-CM

## 2024-08-27 DIAGNOSIS — I25.10 CORONARY ARTERY DISEASE INVOLVING NATIVE CORONARY ARTERY OF NATIVE HEART WITHOUT ANGINA PECTORIS: ICD-10-CM

## 2024-08-27 RX ORDER — CLOPIDOGREL BISULFATE 75 MG/1
75 TABLET ORAL DAILY
Qty: 30 TABLET | Refills: 0 | Status: SHIPPED | OUTPATIENT
Start: 2024-08-27 | End: 2024-09-24

## 2024-08-28 ENCOUNTER — NURSE TRIAGE (OUTPATIENT)
Dept: NURSING | Facility: CLINIC | Age: 74
End: 2024-08-28
Payer: MEDICARE

## 2024-08-28 DIAGNOSIS — Z79.4 TYPE 2 DIABETES MELLITUS WITHOUT COMPLICATION, WITH LONG-TERM CURRENT USE OF INSULIN (H): ICD-10-CM

## 2024-08-28 DIAGNOSIS — E11.9 TYPE 2 DIABETES MELLITUS WITHOUT COMPLICATION, WITH LONG-TERM CURRENT USE OF INSULIN (H): ICD-10-CM

## 2024-08-28 RX ORDER — INSULIN GLARGINE 100 [IU]/ML
40 INJECTION, SOLUTION SUBCUTANEOUS AT BEDTIME
Qty: 15 ML | Refills: 2 | Status: SHIPPED | OUTPATIENT
Start: 2024-08-28

## 2024-08-28 NOTE — TELEPHONE ENCOUNTER
Refill Encounter and will send for processing to refill pool. Two days left of Lantus insulin pen. Sent as priority      Reason for Disposition   Caller requesting a prescription renewal (no refills left), no triage required, and triager able to renew prescription per department policy     Patient has two days left of Lantus.    Protocols used: Medication Refill and Renewal Call-A-[place]

## 2024-08-28 NOTE — TELEPHONE ENCOUNTER
Patient has two days left of Lantus, and wants refill sent to [facility] in [place].

## 2024-09-20 DIAGNOSIS — R06.09 DOE (DYSPNEA ON EXERTION): ICD-10-CM

## 2024-09-20 RX ORDER — ATORVASTATIN CALCIUM 80 MG/1
80 TABLET, FILM COATED ORAL DAILY
Qty: 90 TABLET | Refills: 0 | Status: SHIPPED | OUTPATIENT
Start: 2024-09-20

## 2024-09-20 NOTE — TELEPHONE ENCOUNTER
Overdue follow-up scheduled for 11/19/24 with MAT. Rx refill request approved for 3 months. jose

## 2024-09-21 DIAGNOSIS — I25.10 CORONARY ARTERY DISEASE INVOLVING NATIVE CORONARY ARTERY OF NATIVE HEART WITHOUT ANGINA PECTORIS: ICD-10-CM

## 2024-09-21 DIAGNOSIS — R06.09 DOE (DYSPNEA ON EXERTION): ICD-10-CM

## 2024-09-21 DIAGNOSIS — T88.7XXA MEDICATION SIDE EFFECTS: ICD-10-CM

## 2024-09-24 RX ORDER — CLOPIDOGREL BISULFATE 75 MG/1
75 TABLET ORAL DAILY
Qty: 90 TABLET | Refills: 0 | Status: SHIPPED | OUTPATIENT
Start: 2024-09-24

## 2024-10-05 ENCOUNTER — HEALTH MAINTENANCE LETTER (OUTPATIENT)
Age: 74
End: 2024-10-05

## 2024-11-19 ENCOUNTER — OFFICE VISIT (OUTPATIENT)
Dept: CARDIOLOGY | Facility: CLINIC | Age: 74
End: 2024-11-19
Payer: COMMERCIAL

## 2024-11-19 VITALS
HEART RATE: 60 BPM | OXYGEN SATURATION: 98 % | BODY MASS INDEX: 32.69 KG/M2 | DIASTOLIC BLOOD PRESSURE: 50 MMHG | WEIGHT: 234.4 LBS | SYSTOLIC BLOOD PRESSURE: 110 MMHG | RESPIRATION RATE: 20 BRPM

## 2024-11-19 DIAGNOSIS — Z79.84 DIABETES MELLITUS TREATED WITH ORAL MEDICATION (H): ICD-10-CM

## 2024-11-19 DIAGNOSIS — E78.2 MIXED HYPERLIPIDEMIA: ICD-10-CM

## 2024-11-19 DIAGNOSIS — T88.7XXA MEDICATION SIDE EFFECTS: ICD-10-CM

## 2024-11-19 DIAGNOSIS — I25.10 CORONARY ARTERY DISEASE INVOLVING NATIVE CORONARY ARTERY OF NATIVE HEART WITHOUT ANGINA PECTORIS: Primary | ICD-10-CM

## 2024-11-19 DIAGNOSIS — E11.9 DIABETES MELLITUS TREATED WITH ORAL MEDICATION (H): ICD-10-CM

## 2024-11-19 DIAGNOSIS — Z98.61 PERCUTANEOUS TRANSLUMINAL CORONARY ANGIOPLASTY STATUS: ICD-10-CM

## 2024-11-19 PROBLEM — I20.0 PROGRESSIVE ANGINA (H): Status: RESOLVED | Noted: 2023-08-07 | Resolved: 2024-11-19

## 2024-11-19 PROCEDURE — 99214 OFFICE O/P EST MOD 30 MIN: CPT | Performed by: INTERNAL MEDICINE

## 2024-11-19 PROCEDURE — G2211 COMPLEX E/M VISIT ADD ON: HCPCS | Performed by: INTERNAL MEDICINE

## 2024-11-19 NOTE — PROGRESS NOTES
HEART CARE ENCOUNTER CONSULTATON NOTE      [facility]  [phone]      Assessment/Recommendations   Asessment:   Patient concerned regarding side effect to medications.  Having intermittent loose stools at night.  Could be statin.  2. Coronary Artery disease s/p recent PCI of RCA (July 2023), Proximal LAD stent (3.0 x13 mm Cypher stent, [facility], 2003).    3. Sinus rhythm with PAC (blocking sinus node) resulting is bradycardia.   4. First degree AVB   5. DM type II.  Elevated a1c  Lab Results   Component Value Date    A1C 8.6 07/03/2024    A1C 8.3 12/29/2023    A1C 8.5 07/05/2023    A1C 8.2 02/28/2023    A1C 8.0 07/26/2022     6. HLD, LDL at goal < 70.    Recent Labs   Lab Test 12/29/23  0910 07/21/23  1429 07/26/22  0854 12/16/20  0920 11/19/19  0756   CHOL 135 138   < > 151 142   HDL 40 42   < > 48 47   LDL 64 61   < > 76 72   TRIG 156* 176*   < > 171* 147   CHOLHDLRATIO  --   --   --  3.1 3.0    < > = values in this interval not displayed.          Plan:   Discontinue clopidogrel as he is greater than 12 months out from PCI  Discontinue metoprolol.  Left-ventricular ventricular ejection fraction normal.  Will have him monitor symptoms after discontinuation of both medications above.  If he has ongoing diarrhea would switch statin from atorvastatin to rosuvastatin 40 mg daily.   4.  Aspirin 81 mg daily   5.  Exercise regiment.     Patient remains an avid josé luis.  Plans to again winter in [place]     History of Present Illness/Subjective    HPI: Constantin Pa is a 73 year old male history of coronary artery disease status post percutaneous coronary invention in 2023, hypertension, hyperlipidemia, diabetes who presents to cardiology clinic in follow-up.    Since last evaluation the patient's had a stable course.  He denies any anginal symptoms.  He russ active including hunting and walking woods with no cardiac symptoms.  He denies any dyspnea on exertion.  No orthopnea or PND  symptoms.  No bleeding issues with dual antiplatelet therapy.  Will discontinue Plavix.  Given he has normal left ventricular ejection fraction will discontinue metoprolol.  He does have ongoing loose stools at night.  Will plan on switching statins if he does not notice improvement of symptoms in the next couple weeks.    Echocardiogram Results: Reviewed.  Normal left ventricular function.  No significant valvular heart disease.       Physical Examination  Review of Systems   Vitals: /50 (BP Location: Right arm, Patient Position: Sitting, Cuff Size: Adult Regular)   Pulse 60   Resp 20   Wt 106.3 kg (234 lb 6.4 oz)   SpO2 98%   BMI 32.69 kg/m    BMI= Body mass index is 32.69 kg/m .  Wt Readings from Last 3 Encounters:   11/19/24 106.3 kg (234 lb 6.4 oz)   07/09/24 106.8 kg (235 lb 8 oz)   09/19/23 105.2 kg (231 lb 14.4 oz)           Please refer above for cardiac ROS details.        Medical History  Surgical History Family History Social History   Past Medical History:   Diagnosis Date    CAD (coronary artery disease)      Past Surgical History:   Procedure Laterality Date    ANGIOPLASTY  12/2004    APPENDECTOMY  01/15/2016    ARTHROPLASTY KNEE  09/16/2014    ARTHROPLASTY KNEE  12/20/2013    COLONOSCOPY  03/23/2021    Tubular adenoma x3 size 3-11mm; repeat in 3 yrs    COLONOSCOPY  01/22/2018    COLONOSCOPY  09/19/2016    COLONOSCOPY  12/09/2015    COLONOSCOPY  07/2009    COLONOSCOPY  02/03/2006    CORONARY STENT PLACEMENT N/A x1    10 years ago    CV CORONARY ANGIOGRAM N/A 7/21/2023    Procedure: Coronary Angiogram;  Surgeon: Landon Tellez MD;  Location: [facility] CV    CV FRACTIONAL FLOW RATIO WIRE N/A 7/21/2023    Procedure: Fractional Flow Ratio Wire;  Surgeon: Landon Tellez MD;  Location: [facility] CV    CV PCI N/A 7/21/2023    Procedure: Percutaneous Coronary Intervention;  Surgeon: Landon Tellez MD;  Location: [facility] CV    FLEXIBLE SIGMOIDOSCOPY  12/16/2005     JOINT REPLACEMENT Right     knee    LITHOTRIPSY  1996    OTHER SURGICAL HISTORY      lithotrypsy    Shoulder Class 4 Separation  1978    SHOULDER SURGERY      STRESS TEST  02/15/2007    STRESS TEST  07/18/2013    ECG treadmill    TONSILLECTOMY & ADENOIDECTOMY  1954     Family History   Adopted: Yes        Social History     Socioeconomic History    Marital status:      Spouse name: Not on file    Number of children: Not on file    Years of education: Not on file    Highest education level: Not on file   Occupational History    Not on file   Tobacco Use    Smoking status: Former     Passive exposure: Past    Smokeless tobacco: Never   Vaping Use    Vaping status: Never Used   Substance and Sexual Activity    Alcohol use: Yes     Alcohol/week: 0.0 standard drinks of alcohol    Drug use: Not on file    Sexual activity: Not on file   Other Topics Concern    Not on file   Social History Narrative    Not on file     Social Drivers of Health     Financial Resource Strain: Not on file   Food Insecurity: Not on file   Transportation Needs: Not on file   Physical Activity: Not on file   Stress: Not on file   Social Connections: Not on file   Interpersonal Safety: Low Risk  (7/9/2024)    Interpersonal Safety     Do you feel physically and emotionally safe where you currently live?: Yes     Within the past 12 months, have you been hit, slapped, kicked or otherwise physically hurt by someone?: No     Within the past 12 months, have you been humiliated or emotionally abused in other ways by your partner or ex-partner?: No   Housing Stability: Not on file           Medications  Allergies   Current Outpatient Medications   Medication Sig Dispense Refill    aspirin 81 MG EC tablet Take 1 tablet (81 mg) by mouth daily Start tomorrow. 30 tablet 3    atorvastatin (LIPITOR) 80 MG tablet Take 1 tablet (80 mg) by mouth daily. 90 tablet 0    Ca Carbonate-Mag Hydroxide (ROLAIDS PO) Take 1 tablet by mouth as needed (heartburn)       clopidogrel (PLAVIX) 75 MG tablet TAKE 1 TABLET(75 MG) BY MOUTH DAILY 90 tablet 0    insulin glargine 100 UNIT/ML pen Inject 40 Units subcutaneously at bedtime. (Patient taking differently: Inject 36 Units subcutaneously at bedtime.) 15 mL 2    insulin pen needle (BD PEN NEEDLE LIVAN 2ND GEN) 32G X 4 MM miscellaneous USE WITH BASAGLAR ONCE DAILY 100 each 11    losartan (COZAAR) 50 MG tablet TAKE 1 TABLET(50 MG) BY MOUTH DAILY 90 tablet 3    metFORMIN (GLUCOPHAGE) 1000 MG tablet Take 1 tablet (1,000 mg) by mouth 2 times daily (with meals) Hold off on taking until tomorrow (7/23) evening 180 tablet 3    metoprolol succinate ER (TOPROL XL) 25 MG 24 hr tablet Take 0.5 tablets (12.5 mg) by mouth daily      TRULICITY 3 MG/0.5ML SOPN Inject 3 mg Subcutaneous every 7 days 6 mL 3    blood glucose (ACCU-CHEK GUIDE) test strip TEST BLOOD GLUCOSE TWICE DAILY (Patient not taking: Reported on 11/19/2024) 100 strip 3    blood glucose (NO BRAND SPECIFIED) test strip 1 strip by In Vitro route 2 times daily Use to test blood sugar 2 times daily or as directed. (Patient not taking: Reported on 11/19/2024) 100 strip 3    UNABLE TO FIND Apply 1 Application topically as needed MEDICATION NAME: THC/CBD topical ointment to toes prn (Patient not taking: Reported on 11/19/2024)       No Known Allergies       Lab Results    Chemistry/lipid CBC Cardiac Enzymes/BNP/TSH/INR   Recent Labs   Lab Test 12/29/23  0910 07/26/22  0854 12/16/20  0920   CHOL 135   < > 151   HDL 40   < > 48   LDL 64   < > 76   TRIG 156*   < > 171*   CHOLHDLRATIO  --   --  3.1    < > = values in this interval not displayed.     Recent Labs   Lab Test 12/29/23  0910 07/21/23  1429 07/19/23  1533   LDL 64 61 47     Recent Labs   Lab Test 07/03/24  0837      POTASSIUM 4.5   CHLORIDE 105   CO2 30*   *   BUN 14.4   CR 0.98   GFRESTIMATED 81   YOMAIRA 9.5     Recent Labs   Lab Test 07/03/24  0837 07/22/23  0535 07/21/23  1429   CR 0.98 0.89 0.93     Recent Labs   Lab  "Test 07/03/24  0837 12/29/23  0910 07/05/23  0946   A1C 8.6* 8.3* 8.5*          Recent Labs   Lab Test 07/19/23  1533   WBC 7.0   HGB 14.1   HCT 42.2   MCV 92        Recent Labs   Lab Test 07/19/23  1533 07/05/23  0946 03/28/22  0902   HGB 14.1 14.6 14.7    No results for input(s): \"TROPONINI\" in the last 06503 hours.  No results for input(s): \"BNP\", \"NTBNPI\", \"NTBNP\" in the last 11137 hours.  No results for input(s): \"TSH\" in the last 25008 hours.  No results for input(s): \"INR\" in the last 57881 hours.     Lenny Aj,   Cardiologist     The longitudinal plan of care for the diagnosis(es)/condition(s) as documented were addressed during this visit. Due to the added complexity in care, I will continue to support Constantin in the subsequent management and with ongoing continuity of care.  Will follow-up on medication side effects.  May need further adjustments of medications.                      "

## 2024-11-19 NOTE — LETTER
11/19/2024    Robin Colby MD  [address] [facility] [address]    RE: Constantin Pa       Dear Colleague,     I had the pleasure of seeing Constantin Pa in the [facility].    HEART CARE ENCOUNTER CONSULTATON NOTE      [facility]  [phone]      Assessment/Recommendations   Asessment:   Patient concerned regarding side effect to medications.  Having intermittent loose stools at night.  Could be statin.  2. Coronary Artery disease s/p recent PCI of RCA (July 2023), Proximal LAD stent (3.0 x13 mm Cypher stent, [facility], 2003).    3. Sinus rhythm with PAC (blocking sinus node) resulting is bradycardia.   4. First degree AVB   5. DM type II.  Elevated a1c  Lab Results   Component Value Date    A1C 8.6 07/03/2024    A1C 8.3 12/29/2023    A1C 8.5 07/05/2023    A1C 8.2 02/28/2023    A1C 8.0 07/26/2022     6. HLD, LDL at goal < 70.    Recent Labs   Lab Test 12/29/23  0910 07/21/23  1429 07/26/22  0854 12/16/20  0920 11/19/19  0756   CHOL 135 138   < > 151 142   HDL 40 42   < > 48 47   LDL 64 61   < > 76 72   TRIG 156* 176*   < > 171* 147   CHOLHDLRATIO  --   --   --  3.1 3.0    < > = values in this interval not displayed.          Plan:   Discontinue clopidogrel as he is greater than 12 months out from PCI  Discontinue metoprolol.  Left-ventricular ventricular ejection fraction normal.  Will have him monitor symptoms after discontinuation of both medications above.  If he has ongoing diarrhea would switch statin from atorvastatin to rosuvastatin 40 mg daily.   4.  Aspirin 81 mg daily   5.  Exercise regiment.     Patient remains an avid josé luis.  Plans to again winter in [place]     History of Present Illness/Subjective    HPI: Constantin Pa is a 73 year old male history of coronary artery disease status post percutaneous coronary invention in 2023, hypertension, hyperlipidemia, diabetes who presents to cardiology clinic in follow-up.    Since last evaluation the  patient's had a stable course.  He denies any anginal symptoms.  He russ active including hunting and walking woods with no cardiac symptoms.  He denies any dyspnea on exertion.  No orthopnea or PND symptoms.  No bleeding issues with dual antiplatelet therapy.  Will discontinue Plavix.  Given he has normal left ventricular ejection fraction will discontinue metoprolol.  He does have ongoing loose stools at night.  Will plan on switching statins if he does not notice improvement of symptoms in the next couple weeks.    Echocardiogram Results: Reviewed.  Normal left ventricular function.  No significant valvular heart disease.       Physical Examination  Review of Systems   Vitals: /50 (BP Location: Right arm, Patient Position: Sitting, Cuff Size: Adult Regular)   Pulse 60   Resp 20   Wt 106.3 kg (234 lb 6.4 oz)   SpO2 98%   BMI 32.69 kg/m    BMI= Body mass index is 32.69 kg/m .  Wt Readings from Last 3 Encounters:   11/19/24 106.3 kg (234 lb 6.4 oz)   07/09/24 106.8 kg (235 lb 8 oz)   09/19/23 105.2 kg (231 lb 14.4 oz)           Please refer above for cardiac ROS details.        Medical History  Surgical History Family History Social History   Past Medical History:   Diagnosis Date     CAD (coronary artery disease)      Past Surgical History:   Procedure Laterality Date     ANGIOPLASTY  12/2004     APPENDECTOMY  01/15/2016     ARTHROPLASTY KNEE  09/16/2014     ARTHROPLASTY KNEE  12/20/2013     COLONOSCOPY  03/23/2021    Tubular adenoma x3 size 3-11mm; repeat in 3 yrs     COLONOSCOPY  01/22/2018     COLONOSCOPY  09/19/2016     COLONOSCOPY  12/09/2015     COLONOSCOPY  07/2009     COLONOSCOPY  02/03/2006     CORONARY STENT PLACEMENT N/A x1    10 years ago     CV CORONARY ANGIOGRAM N/A 7/21/2023    Procedure: Coronary Angiogram;  Surgeon: Landon Tellez MD;  Location: [facility] CATH LAB CV     CV FRACTIONAL FLOW RATIO WIRE N/A 7/21/2023    Procedure: Fractional Flow Ratio Wire;  Surgeon: Landon Tellez,  MD;  Location: [facility] CV     CV PCI N/A 7/21/2023    Procedure: Percutaneous Coronary Intervention;  Surgeon: Landon Tellez MD;  Location: [facility] CV     FLEXIBLE SIGMOIDOSCOPY  12/16/2005     JOINT REPLACEMENT Right     knee     LITHOTRIPSY  1996     OTHER SURGICAL HISTORY      lithotrypsy     Shoulder Class 4 Separation  1978     SHOULDER SURGERY       STRESS TEST  02/15/2007     STRESS TEST  07/18/2013    ECG treadmill     TONSILLECTOMY & ADENOIDECTOMY  1954     Family History   Adopted: Yes        Social History     Socioeconomic History     Marital status:      Spouse name: Not on file     Number of children: Not on file     Years of education: Not on file     Highest education level: Not on file   Occupational History     Not on file   Tobacco Use     Smoking status: Former     Passive exposure: Past     Smokeless tobacco: Never   Vaping Use     Vaping status: Never Used   Substance and Sexual Activity     Alcohol use: Yes     Alcohol/week: 0.0 standard drinks of alcohol     Drug use: Not on file     Sexual activity: Not on file   Other Topics Concern     Not on file   Social History Narrative     Not on file     Social Drivers of Health     Financial Resource Strain: Not on file   Food Insecurity: Not on file   Transportation Needs: Not on file   Physical Activity: Not on file   Stress: Not on file   Social Connections: Not on file   Interpersonal Safety: Low Risk  (7/9/2024)    Interpersonal Safety      Do you feel physically and emotionally safe where you currently live?: Yes      Within the past 12 months, have you been hit, slapped, kicked or otherwise physically hurt by someone?: No      Within the past 12 months, have you been humiliated or emotionally abused in other ways by your partner or ex-partner?: No   Housing Stability: Not on file           Medications  Allergies   Current Outpatient Medications   Medication Sig Dispense Refill     aspirin 81 MG EC tablet Take 1  tablet (81 mg) by mouth daily Start tomorrow. 30 tablet 3     atorvastatin (LIPITOR) 80 MG tablet Take 1 tablet (80 mg) by mouth daily. 90 tablet 0     Ca Carbonate-Mag Hydroxide (ROLAIDS PO) Take 1 tablet by mouth as needed (heartburn)       clopidogrel (PLAVIX) 75 MG tablet TAKE 1 TABLET(75 MG) BY MOUTH DAILY 90 tablet 0     insulin glargine 100 UNIT/ML pen Inject 40 Units subcutaneously at bedtime. (Patient taking differently: Inject 36 Units subcutaneously at bedtime.) 15 mL 2     insulin pen needle (BD PEN NEEDLE LIVAN 2ND GEN) 32G X 4 MM miscellaneous USE WITH BASAGLAR ONCE DAILY 100 each 11     losartan (COZAAR) 50 MG tablet TAKE 1 TABLET(50 MG) BY MOUTH DAILY 90 tablet 3     metFORMIN (GLUCOPHAGE) 1000 MG tablet Take 1 tablet (1,000 mg) by mouth 2 times daily (with meals) Hold off on taking until tomorrow (7/23) evening 180 tablet 3     metoprolol succinate ER (TOPROL XL) 25 MG 24 hr tablet Take 0.5 tablets (12.5 mg) by mouth daily       TRULICITY 3 MG/0.5ML SOPN Inject 3 mg Subcutaneous every 7 days 6 mL 3     blood glucose (ACCU-CHEK GUIDE) test strip TEST BLOOD GLUCOSE TWICE DAILY (Patient not taking: Reported on 11/19/2024) 100 strip 3     blood glucose (NO BRAND SPECIFIED) test strip 1 strip by In Vitro route 2 times daily Use to test blood sugar 2 times daily or as directed. (Patient not taking: Reported on 11/19/2024) 100 strip 3     UNABLE TO FIND Apply 1 Application topically as needed MEDICATION NAME: THC/CBD topical ointment to toes prn (Patient not taking: Reported on 11/19/2024)       No Known Allergies       Lab Results    Chemistry/lipid CBC Cardiac Enzymes/BNP/TSH/INR   Recent Labs   Lab Test 12/29/23  0910 07/26/22  0854 12/16/20  0920   CHOL 135   < > 151   HDL 40   < > 48   LDL 64   < > 76   TRIG 156*   < > 171*   CHOLHDLRATIO  --   --  3.1    < > = values in this interval not displayed.     Recent Labs   Lab Test 12/29/23  0910 07/21/23  1429 07/19/23  1533   LDL 64 61 47     Recent Labs  "  Lab Test 07/03/24  0837      POTASSIUM 4.5   CHLORIDE 105   CO2 30*   *   BUN 14.4   CR 0.98   GFRESTIMATED 81   YOMAIRA 9.5     Recent Labs   Lab Test 07/03/24  0837 07/22/23  0535 07/21/23  1429   CR 0.98 0.89 0.93     Recent Labs   Lab Test 07/03/24  0837 12/29/23  0910 07/05/23  0946   A1C 8.6* 8.3* 8.5*          Recent Labs   Lab Test 07/19/23  1533   WBC 7.0   HGB 14.1   HCT 42.2   MCV 92        Recent Labs   Lab Test 07/19/23  1533 07/05/23  0946 03/28/22  0902   HGB 14.1 14.6 14.7    No results for input(s): \"TROPONINI\" in the last 81707 hours.  No results for input(s): \"BNP\", \"NTBNPI\", \"NTBNP\" in the last 00167 hours.  No results for input(s): \"TSH\" in the last 53764 hours.  No results for input(s): \"INR\" in the last 33977 hours.     Lenny Aj DO  Cardiologist     The longitudinal plan of care for the diagnosis(es)/condition(s) as documented were addressed during this visit. Due to the added complexity in care, I will continue to support Peter in the subsequent management and with ongoing continuity of care.  Will follow-up on medication side effects.  May need further adjustments of medications.                        Thank you for allowing me to participate in the care of your patient.      Sincerely,     Lenny Aj DO     [facility]  cc:   No referring provider defined for this encounter.      "

## 2024-11-19 NOTE — PATIENT INSTRUCTIONS
Please contact direct nurses line Monday through Friday 8 AM to 5 PM @ [phone]    After-hours contact cardiology office at [phone].    Plan:    Stop clopidogrel    Stop metoprolol    Would consider changing atorvastatin to rosuvastatin if ongoing loose stools.

## 2024-12-03 ENCOUNTER — TELEPHONE (OUTPATIENT)
Dept: FAMILY MEDICINE | Facility: CLINIC | Age: 74
End: 2024-12-03

## 2024-12-03 DIAGNOSIS — E78.5 DYSLIPIDEMIA: ICD-10-CM

## 2024-12-03 DIAGNOSIS — E11.9 TYPE 2 DIABETES MELLITUS WITHOUT COMPLICATION, UNSPECIFIED WHETHER LONG TERM INSULIN USE (H): Primary | ICD-10-CM

## 2024-12-03 DIAGNOSIS — Z12.5 SCREENING FOR PROSTATE CANCER: ICD-10-CM

## 2024-12-03 NOTE — TELEPHONE ENCOUNTER
Order/Referral Request    Who is requesting: Constantin    Orders being requested: Fasting Labs, PSA, and whatever other labs are needed - scheduled for 12/10/2024    Reason service is needed/diagnosis: Diabetes    When are orders needed by: before lab appointment on 12/10    Has this been discussed with Provider: Yes    Does patient have a preference on a Group/Provider/Facility? [facility]    Does patient have an appointment scheduled?: Yes: 12/10 for lab, 12/31 for PCP    Where to send orders: Place orders within Epic    Could we [facility] this [facility] to [facility] or would you prefer to receive a phone call?:   No preference   Okay to leave a detailed message?: Yes at Home number on file [phone] (home)

## 2024-12-10 ENCOUNTER — LAB (OUTPATIENT)
Dept: LAB | Facility: CLINIC | Age: 74
End: 2024-12-10
Payer: COMMERCIAL

## 2024-12-10 DIAGNOSIS — Z79.84 DIABETES MELLITUS TREATED WITH ORAL MEDICATION (H): Primary | ICD-10-CM

## 2024-12-10 DIAGNOSIS — Z12.5 SCREENING FOR PROSTATE CANCER: ICD-10-CM

## 2024-12-10 DIAGNOSIS — E11.9 DIABETES MELLITUS TREATED WITH ORAL MEDICATION (H): Primary | ICD-10-CM

## 2024-12-10 LAB
CHOLEST SERPL-MCNC: 128 MG/DL
EST. AVERAGE GLUCOSE BLD GHB EST-MCNC: 183 MG/DL
FASTING STATUS PATIENT QL REPORTED: NORMAL
HBA1C MFR BLD: 8 % (ref 0–5.6)
HDLC SERPL-MCNC: 44 MG/DL
LDLC SERPL CALC-MCNC: 60 MG/DL
NONHDLC SERPL-MCNC: 84 MG/DL
PSA SERPL DL<=0.01 NG/ML-MCNC: 5.12 NG/ML (ref 0–6.5)
TRIGL SERPL-MCNC: 122 MG/DL

## 2024-12-10 PROCEDURE — 36415 COLL VENOUS BLD VENIPUNCTURE: CPT

## 2024-12-10 PROCEDURE — G0103 PSA SCREENING: HCPCS

## 2024-12-10 PROCEDURE — 83036 HEMOGLOBIN GLYCOSYLATED A1C: CPT

## 2024-12-10 PROCEDURE — 80061 LIPID PANEL: CPT

## 2024-12-14 ENCOUNTER — HEALTH MAINTENANCE LETTER (OUTPATIENT)
Age: 74
End: 2024-12-14

## 2024-12-17 ENCOUNTER — TRANSFERRED RECORDS (OUTPATIENT)
Dept: AUDIOLOGY | Facility: CLINIC | Age: 74
End: 2024-12-17

## 2024-12-29 DIAGNOSIS — Z79.4 TYPE 2 DIABETES MELLITUS WITHOUT COMPLICATION, WITH LONG-TERM CURRENT USE OF INSULIN (H): ICD-10-CM

## 2024-12-29 DIAGNOSIS — E11.9 TYPE 2 DIABETES MELLITUS WITHOUT COMPLICATION, WITH LONG-TERM CURRENT USE OF INSULIN (H): ICD-10-CM

## 2024-12-30 RX ORDER — INSULIN GLARGINE 100 [IU]/ML
36 INJECTION, SOLUTION SUBCUTANEOUS AT BEDTIME
Qty: 45 ML | Refills: 1 | Status: SHIPPED | OUTPATIENT
Start: 2024-12-30 | End: 2024-12-31

## 2024-12-31 ENCOUNTER — OFFICE VISIT (OUTPATIENT)
Dept: FAMILY MEDICINE | Facility: CLINIC | Age: 74
End: 2024-12-31
Payer: MEDICARE

## 2024-12-31 VITALS
BODY MASS INDEX: 33.04 KG/M2 | OXYGEN SATURATION: 95 % | HEIGHT: 71 IN | WEIGHT: 236 LBS | RESPIRATION RATE: 16 BRPM | SYSTOLIC BLOOD PRESSURE: 125 MMHG | DIASTOLIC BLOOD PRESSURE: 75 MMHG | TEMPERATURE: 97.8 F | HEART RATE: 48 BPM

## 2024-12-31 DIAGNOSIS — R06.09 DOE (DYSPNEA ON EXERTION): ICD-10-CM

## 2024-12-31 DIAGNOSIS — E11.9 TYPE 2 DIABETES MELLITUS WITHOUT COMPLICATION, WITH LONG-TERM CURRENT USE OF INSULIN (H): Primary | ICD-10-CM

## 2024-12-31 DIAGNOSIS — I25.118 ATHEROSCLEROTIC HEART DISEASE OF NATIVE CORONARY ARTERY WITH OTHER FORMS OF ANGINA PECTORIS (H): ICD-10-CM

## 2024-12-31 DIAGNOSIS — Z79.4 TYPE 2 DIABETES MELLITUS WITHOUT COMPLICATION, WITH LONG-TERM CURRENT USE OF INSULIN (H): Primary | ICD-10-CM

## 2024-12-31 PROBLEM — I48.91 ATRIAL FIBRILLATION, UNSPECIFIED TYPE (H): Status: ACTIVE | Noted: 2024-12-31

## 2024-12-31 PROBLEM — E08.59 DIABETES DUE TO UNDERLYING CONDITION W OTH CIRCULATORY COMP (H): Status: ACTIVE | Noted: 2024-12-31

## 2024-12-31 PROCEDURE — 99214 OFFICE O/P EST MOD 30 MIN: CPT | Performed by: FAMILY MEDICINE

## 2024-12-31 PROCEDURE — G2211 COMPLEX E/M VISIT ADD ON: HCPCS | Performed by: FAMILY MEDICINE

## 2024-12-31 RX ORDER — ATORVASTATIN CALCIUM 80 MG/1
80 TABLET, FILM COATED ORAL DAILY
Qty: 90 TABLET | Refills: 2 | Status: SHIPPED | OUTPATIENT
Start: 2024-12-31

## 2024-12-31 RX ORDER — INSULIN GLARGINE 100 [IU]/ML
40 INJECTION, SOLUTION SUBCUTANEOUS AT BEDTIME
Qty: 45 ML | Refills: 1 | Status: SHIPPED | OUTPATIENT
Start: 2024-12-31

## 2024-12-31 RX ORDER — DULAGLUTIDE 3 MG/.5ML
3 INJECTION, SOLUTION SUBCUTANEOUS WEEKLY
Qty: 6 ML | Refills: 3 | Status: SHIPPED | OUTPATIENT
Start: 2024-12-31

## 2024-12-31 NOTE — PROGRESS NOTES
"  Assessment & Plan     Type 2 diabetes mellitus without complication, with long-term current use of insulin (H)  Diabetes is under fair control with hemoglobin A1c is 8.0.  We have discussed benefits of dropping hemoglobin A1c below 8.0.  He will increase insulin glargine to 40 units daily.  He will check his blood glucose more frequently after this change.  - Dulaglutide (TRULICITY) 3 MG/0.5ML SOAJ; Inject 3 mg subcutaneously once a week.  - insulin glargine 100 UNIT/ML pen; Inject 40 Units subcutaneously at bedtime.    Atherosclerotic heart disease of native coronary artery with other forms of angina pectoris (H)  Stable, no angina, recent follow-up with cardiology    ALARCON (dyspnea on exertion)  - atorvastatin (LIPITOR) 80 MG tablet; Take 1 tablet (80 mg) by mouth daily.          BMI  Estimated body mass index is 32.92 kg/m  as calculated from the following:    Height as of this encounter: 1.803 m (5' 11\").    Weight as of this encounter: 107 kg (236 lb).             Christopher Killian is a 74 year old, presenting for the following health issues:  Diabetes (DM follow up)      12/31/2024     1:35 PM   Additional Questions   Roomed by Naina RAPP CMA   Accompanied by Self     History of Present Illness       Diabetes:   He presents for follow up of diabetes.  He is checking home blood glucose a few times a month.   He checks blood glucose before meals.  Blood glucose is never over 200 and never under 70.  When his blood glucose is low, the patient is asymptomatic for confusion, blurred vision, lethargy and reports not feeling dizzy, shaky, or weak.   He has no concerns regarding his diabetes at this time.  He is having numbness in feet and blurry vision.            He eats 0-1 servings of fruits and vegetables daily.He consumes 0 sweetened beverage(s) daily.He exercises with enough effort to increase his heart rate 20 to 29 minutes per day.  He exercises with enough effort to increase his heart rate 3 or less days per " "week. He is missing 1 dose(s) of medications per week.     Patient is here for follow-up on his type 2 diabetes.  Hemoglobin A1c is 8.0.  He does not check his blood glucose very frequently.  He has not dulaglutide, insulin glargine, and metformin.  Cardiovascular diseases well-controlled.  He denies angina.  He has recently seen his cardiologist.        Review of Systems  Constitutional, HEENT, cardiovascular, pulmonary, gi and gu systems are negative, except as otherwise noted.      Objective    /75 (BP Location: Right arm, Patient Position: Sitting, Cuff Size: Adult Large)   Pulse (!) 48   Temp 97.8  F (36.6  C) (Tympanic)   Resp 16   Ht 1.803 m (5' 11\")   Wt 107 kg (236 lb)   SpO2 95%   BMI 32.92 kg/m    Body mass index is 32.92 kg/m .  Physical Exam   GENERAL: alert and no distress  EYES: Eyes grossly normal to inspection, PERRL and conjunctivae and sclerae normal  NECK: no adenopathy, no asymmetry, masses, or scars  RESP: lungs clear to auscultation - no rales, rhonchi or wheezes  CV: regular rate and rhythm, normal S1 S2, no S3 or S4, no murmur, click or rub, no peripheral edema  MS: no gross musculoskeletal defects noted, no edema  PSYCH: mentation appears normal, affect normal/bright    Lab on 12/10/2024   Component Date Value Ref Range Status    Prostate Specific Antigen Screen 12/10/2024 5.12  0.00 - 6.50 ng/mL Final    Estimated Average Glucose 12/10/2024 183 (H)  <117 mg/dL Final    Hemoglobin A1C 12/10/2024 8.0 (H)  0.0 - 5.6 % Final    Normal <5.7%   Prediabetes 5.7-6.4%    Diabetes 6.5% or higher     Note: Adopted from ADA consensus guidelines.    Cholesterol 12/10/2024 128  <200 mg/dL Final    Triglycerides 12/10/2024 122  <150 mg/dL Final    Direct Measure HDL 12/10/2024 44  >=40 mg/dL Final    LDL Cholesterol Calculated 12/10/2024 60  <100 mg/dL Final    Non HDL Cholesterol 12/10/2024 84  <130 mg/dL Final    Patient Fasting > 8hrs? 12/10/2024 Unknown   Final           Signed " Electronically by: Robin Colby MD

## 2025-03-04 DIAGNOSIS — Z79.4 TYPE 2 DIABETES MELLITUS WITHOUT COMPLICATION, WITH LONG-TERM CURRENT USE OF INSULIN (H): ICD-10-CM

## 2025-03-04 DIAGNOSIS — E11.9 TYPE 2 DIABETES MELLITUS WITHOUT COMPLICATION, WITH LONG-TERM CURRENT USE OF INSULIN (H): ICD-10-CM

## 2025-03-04 RX ORDER — BLOOD SUGAR DIAGNOSTIC
STRIP MISCELLANEOUS
Qty: 100 STRIP | Refills: 3 | OUTPATIENT
Start: 2025-03-04

## 2025-03-04 RX ORDER — BLOOD SUGAR DIAGNOSTIC
STRIP MISCELLANEOUS
Qty: 100 STRIP | Refills: 1 | Status: SHIPPED | OUTPATIENT
Start: 2025-03-04

## 2025-03-04 NOTE — TELEPHONE ENCOUNTER
This refill request is a duplicate request, previously received or sent.  Sent denial notification to pharmacy.  Roseann Pelletier RN  [facility]

## 2025-03-16 ENCOUNTER — HEALTH MAINTENANCE LETTER (OUTPATIENT)
Age: 75
End: 2025-03-16

## 2025-05-13 DIAGNOSIS — Z79.4 TYPE 2 DIABETES MELLITUS WITHOUT COMPLICATION, WITH LONG-TERM CURRENT USE OF INSULIN (H): ICD-10-CM

## 2025-05-13 DIAGNOSIS — E11.9 TYPE 2 DIABETES MELLITUS WITHOUT COMPLICATION, WITH LONG-TERM CURRENT USE OF INSULIN (H): ICD-10-CM

## 2025-05-13 RX ORDER — PEN NEEDLE, DIABETIC 32GX 5/32"
NEEDLE, DISPOSABLE MISCELLANEOUS
Qty: 100 EACH | Refills: 6 | Status: SHIPPED | OUTPATIENT
Start: 2025-05-13

## 2025-05-28 DIAGNOSIS — E11.9 TYPE 2 DIABETES MELLITUS WITHOUT COMPLICATION, WITH LONG-TERM CURRENT USE OF INSULIN (H): ICD-10-CM

## 2025-05-28 DIAGNOSIS — Z79.4 TYPE 2 DIABETES MELLITUS WITHOUT COMPLICATION, WITH LONG-TERM CURRENT USE OF INSULIN (H): ICD-10-CM

## 2025-05-30 ENCOUNTER — TELEPHONE (OUTPATIENT)
Dept: CARDIOLOGY | Facility: CLINIC | Age: 75
End: 2025-05-30
Payer: MEDICARE

## 2025-05-30 DIAGNOSIS — E78.2 MIXED HYPERLIPIDEMIA: Primary | ICD-10-CM

## 2025-05-30 NOTE — TELEPHONE ENCOUNTER
"Dr Jaimes please review. Alternate advisement for cholesterol management? Thank you Radha KNIGHT   ___________________________________________    Incoming call and VM from patient. Reporting that since his last OV he has attempted to take Atorvastatin but having ongoing loose stools, describing as \"terrible diarrhea\". He stopped the Atorvastatin and looking for an alternative medication recommendation. CMM,Rn   "

## 2025-06-02 NOTE — TELEPHONE ENCOUNTER
===View-only below this line===  ----- Message -----  From: Lenny Aj DO  Sent: 6/2/2025  10:25 AM CDT  To: Rao Montemayor RN  Subject: RE:                                              Switch to rosuvastatin 20 mg daily.  If SE with Rosuvastatin will need to consider PCSK9i.

## 2025-06-03 RX ORDER — ROSUVASTATIN CALCIUM 20 MG/1
20 TABLET, COATED ORAL DAILY
Qty: 30 TABLET | Refills: 3 | Status: SHIPPED | OUTPATIENT
Start: 2025-06-03

## 2025-06-03 NOTE — TELEPHONE ENCOUNTER
PC with patient and review. Agreeable to trial Rosuvastatin and monitor. Rx sent to verified pharmacy location. 40 day supply with refills to see if tolerated. If goes well, can increase to 90 days supply. Will call if issues. Understands injectable cholesterol lowering agent. No further questions at this time. Atorvastatin listed as an intolerance. EUGENE,RN

## 2025-06-04 ENCOUNTER — TELEPHONE (OUTPATIENT)
Dept: FAMILY MEDICINE | Facility: CLINIC | Age: 75
End: 2025-06-04
Payer: MEDICARE

## 2025-06-04 NOTE — TELEPHONE ENCOUNTER
Patient Quality Outreach    Patient is due for the following:   Diabetes -  A1C, Microalbumin, Diabetic Follow-Up Visit, and Foot Exam  Hypertension -  Hypertension follow-up visit  Physical Annual Wellness Visit    Action(s) Taken:   Schedule a Annual Wellness Visit    Type of outreach:    Sent [employer] message.    Questions for provider review:    None         Naina Moreira MA  Chart routed to None.

## 2025-06-18 ENCOUNTER — OFFICE VISIT (OUTPATIENT)
Dept: FAMILY MEDICINE | Facility: CLINIC | Age: 75
End: 2025-06-18
Payer: MEDICARE

## 2025-06-18 VITALS
OXYGEN SATURATION: 95 % | DIASTOLIC BLOOD PRESSURE: 71 MMHG | WEIGHT: 233.4 LBS | TEMPERATURE: 96.3 F | SYSTOLIC BLOOD PRESSURE: 129 MMHG | HEIGHT: 71 IN | HEART RATE: 71 BPM | BODY MASS INDEX: 32.68 KG/M2 | RESPIRATION RATE: 16 BRPM

## 2025-06-18 DIAGNOSIS — Z12.11 SCREEN FOR COLON CANCER: ICD-10-CM

## 2025-06-18 DIAGNOSIS — E08.59 DIABETES DUE TO UNDERLYING CONDITION W OTH CIRCULATORY COMP (H): ICD-10-CM

## 2025-06-18 DIAGNOSIS — I10 PRIMARY HYPERTENSION: ICD-10-CM

## 2025-06-18 DIAGNOSIS — Z79.4 TYPE 2 DIABETES MELLITUS WITHOUT COMPLICATION, WITH LONG-TERM CURRENT USE OF INSULIN (H): ICD-10-CM

## 2025-06-18 DIAGNOSIS — E11.9 TYPE 2 DIABETES MELLITUS WITHOUT COMPLICATION, WITH LONG-TERM CURRENT USE OF INSULIN (H): ICD-10-CM

## 2025-06-18 DIAGNOSIS — Z00.00 ENCOUNTER FOR MEDICARE ANNUAL WELLNESS EXAM: Primary | ICD-10-CM

## 2025-06-18 DIAGNOSIS — Z98.61 PERCUTANEOUS TRANSLUMINAL CORONARY ANGIOPLASTY STATUS: ICD-10-CM

## 2025-06-18 PROBLEM — I48.91 ATRIAL FIBRILLATION, UNSPECIFIED TYPE (H): Status: RESOLVED | Noted: 2024-12-31 | Resolved: 2025-06-18

## 2025-06-18 LAB
ANION GAP SERPL CALCULATED.3IONS-SCNC: 13 MMOL/L (ref 7–15)
BUN SERPL-MCNC: 16.1 MG/DL (ref 8–23)
CALCIUM SERPL-MCNC: 9.5 MG/DL (ref 8.8–10.4)
CHLORIDE SERPL-SCNC: 100 MMOL/L (ref 98–107)
CREAT SERPL-MCNC: 0.85 MG/DL (ref 0.67–1.17)
CREAT UR-MCNC: 119 MG/DL
EGFRCR SERPLBLD CKD-EPI 2021: >90 ML/MIN/1.73M2
EST. AVERAGE GLUCOSE BLD GHB EST-MCNC: 229 MG/DL
GLUCOSE SERPL-MCNC: 337 MG/DL (ref 70–99)
HBA1C MFR BLD: 9.6 % (ref 0–5.6)
HCO3 SERPL-SCNC: 25 MMOL/L (ref 22–29)
MICROALBUMIN UR-MCNC: 28.3 MG/L
MICROALBUMIN/CREAT UR: 23.78 MG/G CR (ref 0–17)
POTASSIUM SERPL-SCNC: 5 MMOL/L (ref 3.4–5.3)
SODIUM SERPL-SCNC: 138 MMOL/L (ref 135–145)

## 2025-06-18 PROCEDURE — 91320 SARSCV2 VAC 30MCG TRS-SUC IM: CPT | Performed by: FAMILY MEDICINE

## 2025-06-18 PROCEDURE — 3074F SYST BP LT 130 MM HG: CPT | Performed by: FAMILY MEDICINE

## 2025-06-18 PROCEDURE — 99214 OFFICE O/P EST MOD 30 MIN: CPT | Mod: 25 | Performed by: FAMILY MEDICINE

## 2025-06-18 PROCEDURE — 80048 BASIC METABOLIC PNL TOTAL CA: CPT | Performed by: FAMILY MEDICINE

## 2025-06-18 PROCEDURE — 90480 ADMN SARSCOV2 VAC 1/ONLY CMP: CPT | Performed by: FAMILY MEDICINE

## 2025-06-18 PROCEDURE — G2211 COMPLEX E/M VISIT ADD ON: HCPCS | Performed by: FAMILY MEDICINE

## 2025-06-18 PROCEDURE — 82570 ASSAY OF URINE CREATININE: CPT | Performed by: FAMILY MEDICINE

## 2025-06-18 PROCEDURE — 3046F HEMOGLOBIN A1C LEVEL >9.0%: CPT | Performed by: FAMILY MEDICINE

## 2025-06-18 PROCEDURE — 83036 HEMOGLOBIN GLYCOSYLATED A1C: CPT | Performed by: FAMILY MEDICINE

## 2025-06-18 PROCEDURE — 3078F DIAST BP <80 MM HG: CPT | Performed by: FAMILY MEDICINE

## 2025-06-18 PROCEDURE — G0439 PPPS, SUBSEQ VISIT: HCPCS | Performed by: FAMILY MEDICINE

## 2025-06-18 PROCEDURE — 36415 COLL VENOUS BLD VENIPUNCTURE: CPT | Performed by: FAMILY MEDICINE

## 2025-06-18 RX ORDER — INSULIN GLARGINE 100 [IU]/ML
40 INJECTION, SOLUTION SUBCUTANEOUS AT BEDTIME
Qty: 45 ML | Refills: 1 | Status: SHIPPED | OUTPATIENT
Start: 2025-06-18

## 2025-06-18 RX ORDER — LOSARTAN POTASSIUM 50 MG/1
50 TABLET ORAL DAILY
Qty: 90 TABLET | Refills: 3 | Status: SHIPPED | OUTPATIENT
Start: 2025-06-18

## 2025-06-18 SDOH — HEALTH STABILITY: PHYSICAL HEALTH: ON AVERAGE, HOW MANY MINUTES DO YOU ENGAGE IN EXERCISE AT THIS LEVEL?: 90 MIN

## 2025-06-18 SDOH — HEALTH STABILITY: PHYSICAL HEALTH: ON AVERAGE, HOW MANY DAYS PER WEEK DO YOU ENGAGE IN MODERATE TO STRENUOUS EXERCISE (LIKE A BRISK WALK)?: 3 DAYS

## 2025-06-18 ASSESSMENT — SOCIAL DETERMINANTS OF HEALTH (SDOH): HOW OFTEN DO YOU GET TOGETHER WITH FRIENDS OR RELATIVES?: ONCE A WEEK

## 2025-06-18 NOTE — PROGRESS NOTES
"Preventive Care Visit  [facility]  Robin Colby MD, Family Medicine  Jun 18, 2025      Assessment & Plan     Encounter for Medicare annual wellness exam  We have discussed health maintenance, routine follow-up, immunizations, heart healthy diet, regular activity, weight maintenance.     Type 2 diabetes mellitus without complication, with long-term current use of insulin (H)  Diabetes due to underlying condition w oth circulatory comp (H)  Diabetes control has been fair.  He is due for hemoglobin A1c.  He has stopped taking dulaglutide.  I will need to see how this affects his hemoglobin A1c.  He is stay more active.  Hemoglobin A1c goal of less than 8.0.  Alternatives to dulaglutide may be empagliflozin, increased glargine insulin.  - HEMOGLOBIN A1C; Future  - Albumin Random Urine Quantitative with Creat Ratio; Future  - insulin glargine 100 UNIT/ML pen; Inject 40 Units subcutaneously at bedtime.    Percutaneous transluminal coronary angioplasty status  Heart disease is stable.  He denies any angina or anginal equivalents.  He recently switched from atorvastatin to rosuvastatin.  He has very loose stools.  - losartan (COZAAR) 50 MG tablet; Take 1 tablet (50 mg) by mouth daily.    Primary hypertension  Controlled, continue current medication  - BASIC METABOLIC PANEL; Future    Screen for colon cancer  Due for colonoscopy with history of advanced polyp  - Colonoscopy Screening  Referral; Future    Patient has been advised of split billing requirements and indicates understanding: Yes        BMI  Estimated body mass index is 32.55 kg/m  as calculated from the following:    Height as of this encounter: 1.803 m (5' 11\").    Weight as of this encounter: 105.9 kg (233 lb 6.4 oz).     Reviewed preventive health counseling, as reflected in patient instructions       Healthy diet/nutrition       Colorectal cancer screening  Counseling  Appropriate preventive services were addressed " with this patient via screening, questionnaire, or discussion as appropriate for fall prevention, nutrition, physical activity, Tobacco-use cessation, social engagement, weight loss and cognition.  Checklist reviewing preventive services available has been given to the patient.  Reviewed patient's diet, addressing concerns and/or questions.   He is at risk for lack of exercise and has been provided with information to increase physical activity for the benefit of his well-being.   The patient reports drinking more than 3 alcoholic drinks per day and/or more than 7 drhnks per week. The patient was counseled and given information about possible harmful effects of excessive alcohol intake.Patient reported safety concerns were addressed today.The patient was provided with written information regarding signs of hearing loss.             Christopher Killian is a 74 year old, presenting for the following:  Wellness Visit (AWV, due for labs)        6/18/2025     8:21 AM   Additional Questions   Roomed by Naina RAPP CMA   Accompanied by self          HPI  Patient is here for annual wellness exam and follow-up on chronic disease.  He reports he has been feeling well over the last 6 months.  He spent 6 weeks in [place] and [place].  He did a lot of walking.  He reports a healthy diet at the time.  He has been active at home working in his yard.  He denies any anginal issues.  He stopped taking dulaglutide due to cost issues.  He is due for hemoglobin A1c testing.  He is also due for herpes zoster and Tdap vaccines.         Advance Care Planning    Patient states has Health Care Directive and will send to [employer].        6/18/2025   General Health   How would you rate your overall physical health? Good   Feel stress (tense, anxious, or unable to sleep) Not at all         6/18/2025   Nutrition   Diet: Regular (no restrictions)    Diabetic       Multiple values from one day are sorted in reverse-chronological order          6/18/2025   Exercise   Days per week of moderate/strenous exercise 3 days   Average minutes spent exercising at this level 90 min         6/18/2025   Social Factors   Frequency of gathering with friends or relatives Once a week   Worry food won't last until get money to buy more No   Food not last or not have enough money for food? No   Do you have housing? (Housing is defined as stable permanent housing and does not include staying outside in a car, in a tent, in an abandoned building, in an overnight shelter, or couch-surfing.) Yes   Are you worried about losing your housing? No   Lack of transportation? No   Unable to get utilities (heat,electricity)? No         6/18/2025   Fall Risk   Fallen 2 or more times in the past year? No   Trouble with walking or balance? No          6/18/2025   Activities of Daily Living- Home Safety   Needs help with the following daily activites None of the above   Safety concerns in the home Throw rugs in the hallway         6/18/2025   Dental   Dentist two times every year? Yes         6/18/2025   Hearing Screening   Hearing concerns? (!) IT'S HARDER TO UNDERSTAND WOMEN'S VOICES THAN MEN'S VOICES.    (!) IT'S HARD TO FOLLOW A CONVERSATION IN A NOISY RESTAURANT OR CROWDED ROOM.   Would you like a referral for hearing testing? I already have hearing aids       Multiple values from one day are sorted in reverse-chronological order         6/18/2025   Driving Risk Screening   Patient/family members have concerns about driving No         6/18/2025   General Alertness/Fatigue Screening   Have you been more tired than usual lately? No         6/18/2025   Urinary Incontinence Screening   Bothered by leaking urine in past 6 months No         Today's PHQ-2 Score:       6/18/2025     8:21 AM   PHQ-2 ( 1999 [employer])   Q1: Little interest or pleasure in doing things 0   Q2: Feeling down, depressed or hopeless 0   PHQ-2 Score 0    Q1: Little interest or pleasure in doing things Not at all   Q2:  Feeling down, depressed or hopeless Not at all   PHQ-2 Score 0       Patient-reported           6/18/2025   Substance Use   Alcohol more than 3/day or more than 7/wk Yes   How often do you have a drink containing alcohol 4 or more times a week   How many alcohol drinks on typical day 3 or 4   How often do you have 5+ drinks at one occasion Never   Audit 2/3 Score 1   How often not able to stop drinking once started Never   How often failed to do what normally expected Never   How often needed first drink in am after a heavy drinking session Never   How often feeling of guilt or remorse after drinking Never   How often unable to remember what happened the night before Never   Have you or someone else been injured because of your drinking No   Has anyone been concerned or suggested you cut down on drinking No   TOTAL SCORE - AUDIT 5   Do you have a current opioid prescription? No   How severe/bad is pain from 1 to 10? 0/10 (No Pain)   Do you use any other substances recreationally? (!) CANNABIS PRODUCTS     Social History     Tobacco Use    Smoking status: Former     Passive exposure: Past    Smokeless tobacco: Never   Vaping Use    Vaping status: Never Used   Substance Use Topics    Alcohol use: Yes     Alcohol/week: 0.0 standard drinks of alcohol       ASCVD Risk   The ASCVD Risk score (Mark FARIA, et al., 2019) failed to calculate for the following reasons:    Risk score cannot be calculated because patient has a medical history suggesting prior/existing ASCVD            Reviewed and updated as needed this visit by Provider                      Current providers sharing in care for this patient include:  Patient Care Team:  Robin Colby MD as PCP - General (Family Medicine)  Robin Colby MD as Assigned PCP  Lenny Aj DO as Physician (Cardiovascular Disease)  Lenny Aj DO as Assigned Heart and Vascular Provider    The following health maintenance items are reviewed  "in Epic and correct as of today:  Health Maintenance   Topic Date Due    ZOSTER VACCINE (2 of 3) 02/06/2013    DTAP/TDAP/TD VACCINE (3 - Td or Tdap) 12/12/2022    DIABETIC FOOT EXAM  04/04/2023    MEDICARE ANNUAL WELLNESS VISIT  08/15/2023    COLORECTAL CANCER SCREENING  03/23/2024    A1C  03/10/2025    COVID-19 VACCINE (7 - 2024-25 season) 03/13/2025    BMP  07/03/2025    MICROALBUMIN  07/03/2025    ANNUAL REVIEW OF HM ORDERS  07/09/2025    LIPID  12/10/2025    EYE EXAM  05/27/2026    FALL RISK ASSESSMENT  06/18/2026    ADVANCE CARE PLANNING  08/15/2027    HEPATITIS C SCREENING  Completed    PHQ-2 (once per calendar year)  Completed    INFLUENZA VACCINE  Completed    PNEUMOCOCCAL VACCINE 50+ YEARS  Completed    RSV VACCINE  Completed    AORTIC ANEURYSM SCREENING (SYSTEM ASSIGNED)  Completed    HPV VACCINE  Aged Out    MENINGITIS VACCINE  Aged Out    LUNG CANCER SCREENING  Discontinued         Review of Systems  Constitutional, neuro, ENT, endocrine, pulmonary, cardiac, gastrointestinal, genitourinary, musculoskeletal, integument and psychiatric systems are negative, except as otherwise noted.     Objective    Exam  /71 (BP Location: Right arm, Patient Position: Sitting, Cuff Size: Adult Large)   Pulse 71   Temp (!) 96.3  F (35.7  C) (Tympanic)   Resp 16   Ht 1.803 m (5' 11\")   Wt 105.9 kg (233 lb 6.4 oz)   SpO2 95%   BMI 32.55 kg/m     Estimated body mass index is 32.55 kg/m  as calculated from the following:    Height as of this encounter: 1.803 m (5' 11\").    Weight as of this encounter: 105.9 kg (233 lb 6.4 oz).    Physical Exam  GENERAL: alert and no distress  EYES: Eyes grossly normal to inspection, PERRL and conjunctivae and sclerae normal  HENT: ear canals and TM's normal, nose and mouth without ulcers or lesions  NECK: no adenopathy, no asymmetry, masses, or scars  RESP: lungs clear to auscultation - no rales, rhonchi or wheezes  CV: regular rate and rhythm, normal S1 S2, no S3 or S4, no " murmur, click or rub, no peripheral edema  ABDOMEN: soft, nontender, without hepatosplenomegaly or masses, bowel sounds normal, and obese  MS: no gross musculoskeletal defects noted, no edema  SKIN: no suspicious lesions or rashes  PSYCH: mentation appears normal, affect normal/bright        6/18/2025   Mini Cog   Clock Draw Score 2 Normal   3 Item Recall 3 objects recalled   Mini Cog Total Score 5              Signed Electronically by: Robin Colby MD

## 2025-06-18 NOTE — PATIENT INSTRUCTIONS
Patient Education   Preventive Care Advice   This is general advice given by our system to help you stay healthy. However, your care team may have specific advice just for you. Please talk to your care team about your preventive care needs.  Nutrition  Eat 5 or more servings of fruits and vegetables each day.  Try wheat bread, brown rice and whole grain pasta (instead of white bread, rice, and pasta).  Get enough calcium and vitamin D. Check the label on foods and aim for 100% of the RDA (recommended daily allowance).  Lifestyle  Exercise at least 150 minutes each week  (30 minutes a day, 5 days a week).  Do muscle strengthening activities 2 days a week. These help control your weight and prevent disease.  No smoking.  Wear sunscreen to prevent skin cancer.  Have a dental exam and cleaning every 6 months.  Yearly exams  See your health care team every year to talk about:  Any changes in your health.  Any medicines your care team has prescribed.  Preventive care, family planning, and ways to prevent chronic diseases.  Shots (vaccines)   HPV shots (up to age 26), if you've never had them before.  Hepatitis B shots (up to age 59), if you've never had them before.  COVID-19 shot: Get this shot when it's due.  Flu shot: Get a flu shot every year.  Tetanus shot: Get a tetanus shot every 10 years.  Pneumococcal, hepatitis A, and RSV shots: Ask your care team if you need these based on your risk.  Shingles shot (for age 50 and up)  General health tests  Diabetes screening:  Starting at age 35, Get screened for diabetes at least every 3 years.  If you are younger than age 35, ask your care team if you should be screened for diabetes.  Cholesterol test: At age 39, start having a cholesterol test every 5 years, or more often if advised.  Bone density scan (DEXA): At age 50, ask your care team if you should have this scan for osteoporosis (brittle bones).  Hepatitis C: Get tested at least once in your life.  STIs (sexually  transmitted infections)  Before age 24: Ask your care team if you should be screened for STIs.  After age 24: Get screened for STIs if you're at risk. You are at risk for STIs (including HIV) if:  You are sexually active with more than one person.  You don't use condoms every time.  You or a partner was diagnosed with a sexually transmitted infection.  If you are at risk for HIV, ask about PrEP medicine to prevent HIV.  Get tested for HIV at least once in your life, whether you are at risk for HIV or not.  Cancer screening tests  Cervical cancer screening: If you have a cervix, begin getting regular cervical cancer screening tests starting at age 21.  Breast cancer scan (mammogram): If you've ever had breasts, begin having regular mammograms starting at age 40. This is a scan to check for breast cancer.  Colon cancer screening: It is important to start screening for colon cancer at age 45.  Have a colonoscopy test every 10 years (or more often if you're at risk) Or, ask your provider about stool tests like a FIT test every year or Cologuard test every 3 years.  To learn more about your testing options, visit:   .  For help making a decision, visit:   https://bit.ly/qj53332.  Prostate cancer screening test: If you have a prostate, ask your care team if a prostate cancer screening test (PSA) at age 55 is right for you.  Lung cancer screening: If you are a current or former smoker ages 50 to 80, ask your care team if ongoing lung cancer screenings are right for you.  For informational purposes only. Not to replace the advice of your health care provider. Copyright   2023 [facility]. All rights reserved. Clinically reviewed by the [facility]. [employer] 215015 - REV 01/24.  Eating Healthy Foods: Care Instructions  With every meal, you can make healthy food choices. Try to eat a variety of fruits, vegetables, whole grains, lean proteins, and low-fat dairy products. This can help  "you get the right balance of nutrients, including vitamins and minerals. Small changes add up over time. You can start by adding one healthy food to your meals each day.    Try to make half your plate fruits and vegetables, one-fourth whole grains, and one-fourth lean proteins. Try including dairy with your meals.   Eat more fruits and vegetables. Try to have them with most meals and snacks.   Foods for healthy eating        Fruits   These can be fresh, frozen, canned, or dried.  Try to choose whole fruit rather than fruit juice.  Eat a variety of colors.        Vegetables   These can be fresh, frozen, canned, or dried.  Beans, peas, and lentils count too.        Whole grains   Choose whole-grain breads, cereals, and noodles.  Try brown rice.        Lean proteins   These can include lean meat, poultry, fish, and eggs.  You can also have tofu, beans, peas, lentils, nuts, and seeds.        Dairy   Try milk, yogurt, and cheese.  Choose low-fat or fat-free when you can.  If you need to, use lactose-free milk or fortified plant-based milk products, such as soy milk.        Water   Drink water when you're thirsty.  Limit sugar-sweetened drinks, including soda, fruit drinks, and sports drinks.  Where can you learn more?  Go to https://www.RxVault.in.net/patiented  Enter T756 in the search box to learn more about \"Eating Healthy Foods: Care Instructions.\"  Current as of: October 7, 2024  Content Version: 14.5 2024-2025 [employer].   Care instructions adapted under license by your healthcare professional. If you have questions about a medical condition or this instruction, always ask your healthcare professional. [employer] disclaims any warranty or liability for your use of this information.    Learning About Activities of Daily Living  What are activities of daily living?     Activities of daily living (ADLs) are the basic self-care tasks you do every day. These include eating, bathing, dressing, " and moving around.  As you age, and if you have health problems, you may find that it's harder to do some of these tasks. If so, your doctor can suggest ideas that may help.  To measure what kind of help you may need, your doctor will ask how well you are able to do ADLs. Let your doctor know if there are any tasks that you are having trouble doing. This is an important first step to getting help. And when you have the help you need, you can stay as independent as possible.  How will a doctor assess your ADLs?  Asking about ADLs is part of a routine health checkup your doctor will likely do as you age. Your health check might be done in a doctor's office, in your home, or at a hospital. The goal is to find out if you are having any problems that could make it hard to care for yourself or that make it unsafe for you to be on your own.  To measure your ADLs, your doctor will ask how hard it is for you to do routine tasks. Your doctor may also want to know if you have changed the way you do a task because of a health problem. Your doctor may watch how you:  Walk back and forth.  Keep your balance while you stand or walk.  Move from sitting to standing or from a bed to a chair.  Button or unbutton a shirt or sweater.  Remove and put on your shoes.  It's common to feel a little worried or anxious if you find you can't do all the things you used to be able to do. Talking with your doctor about ADLs is a way to make sure you're as safe as possible and able to care for yourself as well as you can. You may want to bring a caregiver, friend, or family member to your checkup. They can help you talk to your doctor.  Follow-up care is a key part of your treatment and safety. Be sure to make and go to all appointments, and call your doctor if you are having problems. It's also a good idea to know your test results and keep a list of the medicines you take.  Current as of: October 24, 2024  Content Version: 14.5    1874-6984  [employer].   Care instructions adapted under license by your healthcare professional. If you have questions about a medical condition or this instruction, always ask your healthcare professional. [employer] disclaims any warranty or liability for your use of this information.    Hearing Loss: Care Instructions  Overview     Hearing loss is a sudden or slow decrease in how well you hear. It can range from slight to profound. Permanent hearing loss can occur with aging. It also can happen when you are exposed long-term to loud noise. Examples include listening to loud music, riding motorcycles, or being around other loud machines.  Hearing loss can affect your work and home life. It can make you feel lonely or depressed. You may feel that you have lost your independence. But hearing aids and other devices can help you hear better and feel connected to others.  Follow-up care is a key part of your treatment and safety. Be sure to make and go to all appointments, and call your doctor if you are having problems. It's also a good idea to know your test results and keep a list of the medicines you take.  How can you care for yourself at home?  Avoid loud noises whenever possible. This helps keep your hearing from getting worse.  Always wear hearing protection around loud noises.  Wear a hearing aid as directed.  A professional can help you pick a hearing aid that will work best for you.  You can also get hearing aids over the counter for mild to moderate hearing loss.  Have hearing tests as your doctor suggests. They can show whether your hearing has changed. Your hearing aid may need to be adjusted.  Use other devices as needed. These may include:  Telephone amplifiers and hearing aids that can connect to a television, stereo, radio, or microphone.  Devices that use lights or vibrations. These alert you to the doorbell, a ringing telephone, or a baby monitor.  Television closed-captioning. This  "shows the words at the bottom of the screen. Most new TVs can do this.  TTY (text telephone). This lets you type messages back and forth on the telephone instead of talking or listening. These devices are also called TDD. When messages are typed on the keyboard, they are sent over the phone line to a receiving TTY. The message is shown on a monitor.  Use text messaging, social media, and email if it is hard for you to communicate by telephone.  Try to learn a listening technique called speechreading. It is not lipreading. You pay attention to people's gestures, expressions, posture, and tone of voice. These clues can help you understand what a person is saying. Face the person you are talking to, and have them face you. Make sure the lighting is good. You need to see the other person's face clearly.  Think about counseling if you need help to adjust to your hearing loss.  When should you call for help?  Watch closely for changes in your health, and be sure to contact your doctor if:    You think your hearing is getting worse.     You have new symptoms, such as dizziness or nausea.   Where can you learn more?  Go to https://www.[employer].net/patiented  Enter R798 in the search box to learn more about \"Hearing Loss: Care Instructions.\"  Current as of: October 27, 2024  Content Version: 14.5    6171-4775 [employer].   Care instructions adapted under license by your healthcare professional. If you have questions about a medical condition or this instruction, always ask your healthcare professional. [employer] disclaims any warranty or liability for your use of this information.    9 Ways to Cut Back on Drinking  Maybe you've found yourself drinking more alcohol than you'd prefer. If you want to cut back, here are some ideas to try.    Think before you drink.  Do you really want a drink, or is it just a habit? If you're used to having a drink at a certain time, try doing something else then.     " "Look for substitutes.  Find some no-alcohol drinks that you enjoy, like flavored seltzer water, tea with honey, or tonic with a slice of lime. Or try alcohol-free beer or \"virgin\" cocktails (without the alcohol).     Drink more water.  Use water to quench your thirst. Drink a glass of water before you have any alcohol. Have another glass along with every drink or between drinks.     Shrink your drink.  For example, have a bottle of beer instead of a pint. Use a smaller glass for wine. Choose drinks with lower alcohol content (ABV%). Or use less liquor and more mixer in cocktails.     Slow down.  It's easy to drink quickly and without thinking about it. Pay attention, and make each drink last longer.     Do the math.  Total up how much you spend on alcohol each month. How much is that a year? If you cut back, what could you do with the money you save?     Take a break.  Choose a day or two each week when you won't drink at all. Notice how you feel on those days, physically and emotionally. How did you sleep? Do you feel better? Over time, add more break days.     Count calories.  Would you like to lose some weight? For some people that's a good motivator for cutting back. Figure out how many calories are in each drink. How many does that add up to in a day? In a week? In a month?     Practice saying no.  Be ready when someone offers you a drink. Try: \"Thanks, I've had enough.\" Or \"Thanks, but I'm cutting back.\" Or \"No, thanks. I feel better when I drink less.\"   Current as of: August 20, 2024  Content Version: 14.5 2024-2025 [employer].   Care instructions adapted under license by your healthcare professional. If you have questions about a medical condition or this instruction, always ask your healthcare professional. [employer] disclaims any warranty or liability for your use of this information.  Substance Use Disorder: Care Instructions  Overview     You can improve your life and health " by stopping your use of alcohol or drugs. When you don't drink or use drugs, you may feel and sleep better. You may get along better with your family, friends, and coworkers. There are medicines and programs that can help with substance use disorder.  How can you care for yourself at home?  Here are some ways to help you stay sober and prevent relapse.  If you have been given medicine to help keep you sober or reduce your cravings, be sure to take it exactly as prescribed.  Talk to your doctor about programs that can help you stop using drugs or drinking alcohol.  Do not keep alcohol or drugs in your home.  Plan ahead. Think about what you'll say if other people ask you to drink or use drugs. Try not to spend time with people who drink or use drugs.  Use the time and money spent on drinking or drugs to do something that's important to you.  Preventing a relapse  Have a plan to deal with relapse. Learn to recognize changes in your thinking that lead you to drink or use drugs. Get help before you start to drink or use drugs again.  Try to stay away from situations, friends, or places that may lead you to drink or use drugs.  If you feel the need to drink alcohol or use drugs again, seek help right away. Call a trusted friend or family member. Some people get support from organizations such as Narcotics Anonymous or [employer] or from treatment facilities.  If you relapse, get help as soon as you can. Some people make a plan with another person that outlines what they want that person to do for them if they relapse. The plan usually includes how to handle the relapse and who to notify in case of relapse.  Don't give up. Remember that a relapse doesn't mean that you have failed. Use the experience to learn the triggers that lead you to drink or use drugs. Then quit again. Recovery is a lifelong process. Many people have several relapses before they are able to quit for good.  Follow-up care is a key part of your  "treatment and safety. Be sure to make and go to all appointments, and call your doctor if you are having problems. It's also a good idea to know your test results and keep a list of the medicines you take.  When should you call for help?   Call 911  anytime you think you may need emergency care. For example, call if you or someone else:    Has overdosed or has withdrawal signs. Be sure to tell the emergency workers that you are or someone else is using or trying to quit using drugs. Overdose or withdrawal signs may include:  Losing consciousness.  Seizure.  Seeing or hearing things that aren't there (hallucinations).     Is thinking or talking about suicide or harming others.   Where to get help 24 hours a day, 7 days a week   If you or someone you know talks about suicide, self-harm, a mental health crisis, a substance use crisis, or any other kind of emotional distress, get help right away. You can:    Call the Suicide and Crisis Lifeline at 746.     Call 1-519-224-TALK (1-325.312.6870).     Text HOME to 027308 to access the Crisis Text Line.   Consider saving these numbers in your phone.  Go to Real [employer] for more information or to chat online.  Call your doctor now or seek immediate medical care if:    You are having withdrawal symptoms. These may include nausea or vomiting, sweating, shakiness, and anxiety.   Watch closely for changes in your health, and be sure to contact your doctor if:    You have a relapse.     You need more help or support to stop.   Where can you learn more?  Go to https://www.[employer].net/patiented  Enter H573 in the search box to learn more about \"Substance Use Disorder: Care Instructions.\"  Current as of: August 20, 2024  Content Version: 14.5    5654-4819 [employer].   Care instructions adapted under license by your healthcare professional. If you have questions about a medical condition or this instruction, always ask your healthcare professional. [employer] disclaims any warranty or liability for your use of this information.

## 2025-06-19 ENCOUNTER — RESULTS FOLLOW-UP (OUTPATIENT)
Dept: FAMILY MEDICINE | Facility: CLINIC | Age: 75
End: 2025-06-19

## 2025-08-12 DIAGNOSIS — Z79.4 TYPE 2 DIABETES MELLITUS WITHOUT COMPLICATION, WITH LONG-TERM CURRENT USE OF INSULIN (H): ICD-10-CM

## 2025-08-12 DIAGNOSIS — E11.9 TYPE 2 DIABETES MELLITUS WITHOUT COMPLICATION, WITH LONG-TERM CURRENT USE OF INSULIN (H): ICD-10-CM

## 2025-08-12 RX ORDER — DULAGLUTIDE 3 MG/.5ML
INJECTION, SOLUTION SUBCUTANEOUS
Qty: 6 ML | Refills: 5 | Status: SHIPPED | OUTPATIENT
Start: 2025-08-12

## (undated) DEVICE — DEVICE INFLATION SYR W/ HEMOSTASIS VALVE 12IN EXT IN4904

## (undated) DEVICE — SHTH INTRO 0.021IN ID 6FR DIA

## (undated) DEVICE — GUIDEWIRE VASCULAR COMET II 185CML PRESSURE H74939359110

## (undated) DEVICE — SYR ANGIOGRAPHY MULTIUSE KIT ACIST 014612

## (undated) DEVICE — CATH LAUNCHER 6FR EBU 3.5 LA6EBU35

## (undated) DEVICE — VALVE HEMOSTASIS .096" COPILOT MECH 1003331

## (undated) DEVICE — CATH ANGIO INFINITI JL3.5 4FRX100CM 538418

## (undated) DEVICE — CATH BALLOON NC EMERGE 4.00X12MM H7493926712400

## (undated) DEVICE — MANIFOLD KIT ANGIO AUTOMATED 014613

## (undated) DEVICE — GUIDEWIRE FORTE FLOPPY J TOP 34949-05J

## (undated) DEVICE — CATH LAUNCHER 6FR JR 4.0 LA6JR40

## (undated) DEVICE — SLEEVE TR BAND RADIAL COMPRESSION DEVICE 24CM TRB24-REG

## (undated) DEVICE — CATH GUIDELINER 6FR 5571

## (undated) DEVICE — CUSTOM PACK CORONARY SAN5BCRHEA

## (undated) DEVICE — CATH DIAG RADIAL 5FR TIG 4.5 100CM

## (undated) DEVICE — EXCHANGE WIRE .035 260 STAR/JFC/035/260/ M001491681

## (undated) DEVICE — ELECTRODE DEFIB CADENCE 22550R

## (undated) DEVICE — CATH BALLOON EMERGE 2.5X12MM H7493918912250

## (undated) RX ORDER — FENTANYL CITRATE 50 UG/ML
INJECTION, SOLUTION INTRAMUSCULAR; INTRAVENOUS
Status: DISPENSED
Start: 2023-07-21

## (undated) RX ORDER — DIAZEPAM 5 MG
TABLET ORAL
Status: DISPENSED
Start: 2023-07-21